# Patient Record
Sex: FEMALE | Race: BLACK OR AFRICAN AMERICAN | Employment: UNEMPLOYED | URBAN - METROPOLITAN AREA
[De-identification: names, ages, dates, MRNs, and addresses within clinical notes are randomized per-mention and may not be internally consistent; named-entity substitution may affect disease eponyms.]

---

## 2019-03-24 ENCOUNTER — HOSPITAL ENCOUNTER (INPATIENT)
Age: 39
LOS: 2 days | Discharge: HOME | DRG: 245 | End: 2019-03-27
Attending: EMERGENCY MEDICINE | Admitting: INTERNAL MEDICINE
Payer: MEDICAID

## 2019-03-24 ENCOUNTER — APPOINTMENT (OUTPATIENT)
Dept: CT IMAGING | Age: 39
DRG: 245 | End: 2019-03-24
Payer: MEDICAID

## 2019-03-24 DIAGNOSIS — K50.919 ACUTE CROHN'S DISEASE WITH COMPLICATION (HCC): Primary | ICD-10-CM

## 2019-03-24 DIAGNOSIS — R10.32 LEFT LOWER QUADRANT PAIN: ICD-10-CM

## 2019-03-24 LAB
ALBUMIN SERPL-MCNC: 4.3 G/DL (ref 3.4–5)
ALP BLD-CCNC: 64 U/L (ref 40–129)
ALT SERPL-CCNC: 15 U/L (ref 10–40)
ANION GAP SERPL CALCULATED.3IONS-SCNC: 15 MMOL/L (ref 3–16)
ANISOCYTOSIS: ABNORMAL
AST SERPL-CCNC: 27 U/L (ref 15–37)
BACTERIA: ABNORMAL /HPF
BASOPHILS ABSOLUTE: 0 K/UL (ref 0–0.2)
BASOPHILS RELATIVE PERCENT: 0.2 %
BILIRUB SERPL-MCNC: 0.7 MG/DL (ref 0–1)
BILIRUBIN DIRECT: <0.2 MG/DL (ref 0–0.3)
BILIRUBIN URINE: ABNORMAL
BILIRUBIN, INDIRECT: NORMAL MG/DL (ref 0–1)
BLOOD, URINE: NEGATIVE
BUN BLDV-MCNC: 8 MG/DL (ref 7–20)
CALCIUM SERPL-MCNC: 9.1 MG/DL (ref 8.3–10.6)
CHLORIDE BLD-SCNC: 99 MMOL/L (ref 99–110)
CLARITY: CLEAR
CO2: 21 MMOL/L (ref 21–32)
COLOR: YELLOW
CREAT SERPL-MCNC: <0.5 MG/DL (ref 0.6–1.1)
EOSINOPHILS ABSOLUTE: 0.1 K/UL (ref 0–0.6)
EOSINOPHILS RELATIVE PERCENT: 0.6 %
EPITHELIAL CELLS, UA: ABNORMAL /HPF
GFR AFRICAN AMERICAN: >60
GFR NON-AFRICAN AMERICAN: >60
GLUCOSE BLD-MCNC: 97 MG/DL (ref 70–99)
GLUCOSE URINE: NEGATIVE MG/DL
HCG(URINE) PREGNANCY TEST: NEGATIVE
HCT VFR BLD CALC: 43.7 % (ref 36–48)
HEMOGLOBIN: 14.3 G/DL (ref 12–16)
KETONES, URINE: >=80 MG/DL
LEUKOCYTE ESTERASE, URINE: NEGATIVE
LIPASE: 13 U/L (ref 13–60)
LYMPHOCYTES ABSOLUTE: 0.6 K/UL (ref 1–5.1)
LYMPHOCYTES RELATIVE PERCENT: 5.6 %
MACROCYTES: ABNORMAL
MCH RBC QN AUTO: 22.4 PG (ref 26–34)
MCHC RBC AUTO-ENTMCNC: 32.6 G/DL (ref 31–36)
MCV RBC AUTO: 68.6 FL (ref 80–100)
MICROCYTES: ABNORMAL
MICROSCOPIC EXAMINATION: YES
MONOCYTES ABSOLUTE: 0.7 K/UL (ref 0–1.3)
MONOCYTES RELATIVE PERCENT: 6.7 %
MUCUS: ABNORMAL /LPF
NEUTROPHILS ABSOLUTE: 9.4 K/UL (ref 1.7–7.7)
NEUTROPHILS RELATIVE PERCENT: 86.9 %
NITRITE, URINE: NEGATIVE
PDW BLD-RTO: 41.3 % (ref 12.4–15.4)
PH UA: 5.5 (ref 5–8)
PLATELET # BLD: 416 K/UL (ref 135–450)
PMV BLD AUTO: 8.8 FL (ref 5–10.5)
POLYCHROMASIA: ABNORMAL
POTASSIUM REFLEX MAGNESIUM: 4.3 MMOL/L (ref 3.5–5.1)
PROTEIN UA: ABNORMAL MG/DL
RBC # BLD: 6.38 M/UL (ref 4–5.2)
RBC UA: ABNORMAL /HPF (ref 0–2)
SCHISTOCYTES: ABNORMAL
SODIUM BLD-SCNC: 135 MMOL/L (ref 136–145)
SPECIFIC GRAVITY UA: >=1.03 (ref 1–1.03)
TARGET CELLS: ABNORMAL
TOTAL PROTEIN: 8.2 G/DL (ref 6.4–8.2)
URINE TYPE: ABNORMAL
UROBILINOGEN, URINE: 0.2 E.U./DL
WBC # BLD: 10.8 K/UL (ref 4–11)
WBC UA: ABNORMAL /HPF (ref 0–5)

## 2019-03-24 PROCEDURE — 83690 ASSAY OF LIPASE: CPT

## 2019-03-24 PROCEDURE — 80048 BASIC METABOLIC PNL TOTAL CA: CPT

## 2019-03-24 PROCEDURE — 85025 COMPLETE CBC W/AUTO DIFF WBC: CPT

## 2019-03-24 PROCEDURE — 6360000004 HC RX CONTRAST MEDICATION: Performed by: EMERGENCY MEDICINE

## 2019-03-24 PROCEDURE — 96365 THER/PROPH/DIAG IV INF INIT: CPT

## 2019-03-24 PROCEDURE — 74177 CT ABD & PELVIS W/CONTRAST: CPT

## 2019-03-24 PROCEDURE — 84703 CHORIONIC GONADOTROPIN ASSAY: CPT

## 2019-03-24 PROCEDURE — 96375 TX/PRO/DX INJ NEW DRUG ADDON: CPT

## 2019-03-24 PROCEDURE — 6360000002 HC RX W HCPCS

## 2019-03-24 PROCEDURE — 99285 EMERGENCY DEPT VISIT HI MDM: CPT

## 2019-03-24 PROCEDURE — 2580000003 HC RX 258: Performed by: EMERGENCY MEDICINE

## 2019-03-24 PROCEDURE — 81001 URINALYSIS AUTO W/SCOPE: CPT

## 2019-03-24 PROCEDURE — 80076 HEPATIC FUNCTION PANEL: CPT

## 2019-03-24 RX ORDER — MORPHINE SULFATE 4 MG/ML
4 INJECTION, SOLUTION INTRAMUSCULAR; INTRAVENOUS ONCE
Status: COMPLETED | OUTPATIENT
Start: 2019-03-24 | End: 2019-03-24

## 2019-03-24 RX ORDER — PROMETHAZINE HYDROCHLORIDE 25 MG/1
25 TABLET ORAL EVERY 6 HOURS PRN
Status: ON HOLD | COMMUNITY
End: 2019-09-18 | Stop reason: HOSPADM

## 2019-03-24 RX ORDER — OMEPRAZOLE 20 MG/1
20 CAPSULE, DELAYED RELEASE ORAL DAILY
COMMUNITY

## 2019-03-24 RX ORDER — SODIUM CHLORIDE, SODIUM LACTATE, POTASSIUM CHLORIDE, AND CALCIUM CHLORIDE .6; .31; .03; .02 G/100ML; G/100ML; G/100ML; G/100ML
1000 INJECTION, SOLUTION INTRAVENOUS ONCE
Status: COMPLETED | OUTPATIENT
Start: 2019-03-24 | End: 2019-03-24

## 2019-03-24 RX ORDER — MORPHINE SULFATE 4 MG/ML
INJECTION, SOLUTION INTRAMUSCULAR; INTRAVENOUS
Status: COMPLETED
Start: 2019-03-24 | End: 2019-03-24

## 2019-03-24 RX ADMIN — MORPHINE SULFATE 4 MG: 4 INJECTION, SOLUTION INTRAMUSCULAR; INTRAVENOUS at 20:54

## 2019-03-24 RX ADMIN — SODIUM CHLORIDE, POTASSIUM CHLORIDE, SODIUM LACTATE AND CALCIUM CHLORIDE 1000 ML: 600; 310; 30; 20 INJECTION, SOLUTION INTRAVENOUS at 22:22

## 2019-03-24 RX ADMIN — IOPAMIDOL 80 ML: 755 INJECTION, SOLUTION INTRAVENOUS at 23:50

## 2019-03-24 ASSESSMENT — PAIN SCALES - GENERAL
PAINLEVEL_OUTOF10: 7
PAINLEVEL_OUTOF10: 7

## 2019-03-24 ASSESSMENT — PAIN DESCRIPTION - LOCATION: LOCATION: ABDOMEN

## 2019-03-25 PROBLEM — R10.9 ABDOMINAL PAIN: Status: ACTIVE | Noted: 2019-03-25

## 2019-03-25 LAB
ANION GAP SERPL CALCULATED.3IONS-SCNC: 12 MMOL/L (ref 3–16)
ANISOCYTOSIS: ABNORMAL
BASOPHILS ABSOLUTE: 0 K/UL (ref 0–0.2)
BASOPHILS RELATIVE PERCENT: 0.4 %
BUN BLDV-MCNC: 6 MG/DL (ref 7–20)
CALCIUM SERPL-MCNC: 8.4 MG/DL (ref 8.3–10.6)
CHLORIDE BLD-SCNC: 105 MMOL/L (ref 99–110)
CO2: 22 MMOL/L (ref 21–32)
CREAT SERPL-MCNC: <0.5 MG/DL (ref 0.6–1.1)
EOSINOPHILS ABSOLUTE: 0.2 K/UL (ref 0–0.6)
EOSINOPHILS RELATIVE PERCENT: 3.6 %
GFR AFRICAN AMERICAN: >60
GFR NON-AFRICAN AMERICAN: >60
GLUCOSE BLD-MCNC: 75 MG/DL (ref 70–99)
HCT VFR BLD CALC: 33 % (ref 36–48)
HEMOGLOBIN: 10.7 G/DL (ref 12–16)
HYPOCHROMIA: ABNORMAL
LACTIC ACID: 0.7 MMOL/L (ref 0.4–2)
LYMPHOCYTES ABSOLUTE: 0.9 K/UL (ref 1–5.1)
LYMPHOCYTES RELATIVE PERCENT: 14.6 %
MACROCYTES: ABNORMAL
MCH RBC QN AUTO: 22.1 PG (ref 26–34)
MCHC RBC AUTO-ENTMCNC: 32.4 G/DL (ref 31–36)
MCV RBC AUTO: 68.2 FL (ref 80–100)
MONOCYTES ABSOLUTE: 0.6 K/UL (ref 0–1.3)
MONOCYTES RELATIVE PERCENT: 9.5 %
NEUTROPHILS ABSOLUTE: 4.5 K/UL (ref 1.7–7.7)
NEUTROPHILS RELATIVE PERCENT: 71.9 %
PLATELET # BLD: 313 K/UL (ref 135–450)
PMV BLD AUTO: 8.4 FL (ref 5–10.5)
POTASSIUM REFLEX MAGNESIUM: 3.8 MMOL/L (ref 3.5–5.1)
RBC # BLD: 4.84 M/UL (ref 4–5.2)
SLIDE REVIEW: ABNORMAL
SODIUM BLD-SCNC: 139 MMOL/L (ref 136–145)
WBC # BLD: 6.3 K/UL (ref 4–11)

## 2019-03-25 PROCEDURE — 80048 BASIC METABOLIC PNL TOTAL CA: CPT

## 2019-03-25 PROCEDURE — 96376 TX/PRO/DX INJ SAME DRUG ADON: CPT

## 2019-03-25 PROCEDURE — 87324 CLOSTRIDIUM AG IA: CPT

## 2019-03-25 PROCEDURE — 6360000002 HC RX W HCPCS: Performed by: INTERNAL MEDICINE

## 2019-03-25 PROCEDURE — 2580000003 HC RX 258: Performed by: INTERNAL MEDICINE

## 2019-03-25 PROCEDURE — 87449 NOS EACH ORGANISM AG IA: CPT

## 2019-03-25 PROCEDURE — 6360000002 HC RX W HCPCS: Performed by: EMERGENCY MEDICINE

## 2019-03-25 PROCEDURE — 2580000003 HC RX 258: Performed by: EMERGENCY MEDICINE

## 2019-03-25 PROCEDURE — 6370000000 HC RX 637 (ALT 250 FOR IP): Performed by: INTERNAL MEDICINE

## 2019-03-25 PROCEDURE — 96375 TX/PRO/DX INJ NEW DRUG ADDON: CPT

## 2019-03-25 PROCEDURE — 83020 HEMOGLOBIN ELECTROPHORESIS: CPT

## 2019-03-25 PROCEDURE — 85025 COMPLETE CBC W/AUTO DIFF WBC: CPT

## 2019-03-25 PROCEDURE — 86708 HEPATITIS A ANTIBODY: CPT

## 2019-03-25 PROCEDURE — 99223 1ST HOSP IP/OBS HIGH 75: CPT | Performed by: SURGERY

## 2019-03-25 PROCEDURE — 1200000000 HC SEMI PRIVATE

## 2019-03-25 PROCEDURE — 83605 ASSAY OF LACTIC ACID: CPT

## 2019-03-25 PROCEDURE — 36415 COLL VENOUS BLD VENIPUNCTURE: CPT

## 2019-03-25 RX ORDER — BUDESONIDE 3 MG/1
9 CAPSULE, COATED PELLETS ORAL DAILY
Status: DISCONTINUED | OUTPATIENT
Start: 2019-03-25 | End: 2019-03-27 | Stop reason: HOSPADM

## 2019-03-25 RX ORDER — MORPHINE SULFATE 4 MG/ML
4 INJECTION, SOLUTION INTRAMUSCULAR; INTRAVENOUS ONCE
Status: COMPLETED | OUTPATIENT
Start: 2019-03-25 | End: 2019-03-25

## 2019-03-25 RX ORDER — 0.9 % SODIUM CHLORIDE 0.9 %
1000 INTRAVENOUS SOLUTION INTRAVENOUS ONCE
Status: COMPLETED | OUTPATIENT
Start: 2019-03-25 | End: 2019-03-25

## 2019-03-25 RX ORDER — ACETAMINOPHEN 325 MG/1
650 TABLET ORAL EVERY 4 HOURS PRN
Status: DISCONTINUED | OUTPATIENT
Start: 2019-03-25 | End: 2019-03-27 | Stop reason: HOSPADM

## 2019-03-25 RX ORDER — POTASSIUM CHLORIDE 7.45 MG/ML
10 INJECTION INTRAVENOUS PRN
Status: DISCONTINUED | OUTPATIENT
Start: 2019-03-25 | End: 2019-03-27 | Stop reason: HOSPADM

## 2019-03-25 RX ORDER — MORPHINE SULFATE 2 MG/ML
2 INJECTION, SOLUTION INTRAMUSCULAR; INTRAVENOUS ONCE
Status: COMPLETED | OUTPATIENT
Start: 2019-03-25 | End: 2019-03-25

## 2019-03-25 RX ORDER — ONDANSETRON 2 MG/ML
4 INJECTION INTRAMUSCULAR; INTRAVENOUS EVERY 6 HOURS PRN
Status: DISCONTINUED | OUTPATIENT
Start: 2019-03-25 | End: 2019-03-27 | Stop reason: HOSPADM

## 2019-03-25 RX ORDER — SODIUM CHLORIDE, SODIUM LACTATE, POTASSIUM CHLORIDE, CALCIUM CHLORIDE 600; 310; 30; 20 MG/100ML; MG/100ML; MG/100ML; MG/100ML
INJECTION, SOLUTION INTRAVENOUS CONTINUOUS
Status: DISCONTINUED | OUTPATIENT
Start: 2019-03-25 | End: 2019-03-26

## 2019-03-25 RX ORDER — MORPHINE SULFATE 2 MG/ML
2 INJECTION, SOLUTION INTRAMUSCULAR; INTRAVENOUS EVERY 4 HOURS PRN
Status: DISPENSED | OUTPATIENT
Start: 2019-03-25 | End: 2019-03-26

## 2019-03-25 RX ORDER — POTASSIUM CHLORIDE 1.5 G/1.77G
40 POWDER, FOR SOLUTION ORAL PRN
Status: DISCONTINUED | OUTPATIENT
Start: 2019-03-25 | End: 2019-03-27 | Stop reason: HOSPADM

## 2019-03-25 RX ORDER — POTASSIUM CHLORIDE 20 MEQ/1
40 TABLET, EXTENDED RELEASE ORAL PRN
Status: DISCONTINUED | OUTPATIENT
Start: 2019-03-25 | End: 2019-03-27 | Stop reason: HOSPADM

## 2019-03-25 RX ORDER — MAGNESIUM SULFATE 1 G/100ML
1 INJECTION INTRAVENOUS PRN
Status: DISCONTINUED | OUTPATIENT
Start: 2019-03-25 | End: 2019-03-27 | Stop reason: HOSPADM

## 2019-03-25 RX ORDER — SODIUM CHLORIDE 0.9 % (FLUSH) 0.9 %
10 SYRINGE (ML) INJECTION PRN
Status: DISCONTINUED | OUTPATIENT
Start: 2019-03-25 | End: 2019-03-27 | Stop reason: HOSPADM

## 2019-03-25 RX ORDER — CIPROFLOXACIN 2 MG/ML
400 INJECTION, SOLUTION INTRAVENOUS ONCE
Status: DISCONTINUED | OUTPATIENT
Start: 2019-03-25 | End: 2019-03-25

## 2019-03-25 RX ORDER — PROMETHAZINE HYDROCHLORIDE 25 MG/1
25 TABLET ORAL EVERY 6 HOURS PRN
Status: DISCONTINUED | OUTPATIENT
Start: 2019-03-25 | End: 2019-03-27 | Stop reason: HOSPADM

## 2019-03-25 RX ORDER — SODIUM CHLORIDE 0.9 % (FLUSH) 0.9 %
10 SYRINGE (ML) INJECTION EVERY 12 HOURS SCHEDULED
Status: DISCONTINUED | OUTPATIENT
Start: 2019-03-25 | End: 2019-03-27 | Stop reason: HOSPADM

## 2019-03-25 RX ADMIN — MORPHINE SULFATE 2 MG: 2 INJECTION, SOLUTION INTRAMUSCULAR; INTRAVENOUS at 09:02

## 2019-03-25 RX ADMIN — SODIUM CHLORIDE, POTASSIUM CHLORIDE, SODIUM LACTATE AND CALCIUM CHLORIDE: 600; 310; 30; 20 INJECTION, SOLUTION INTRAVENOUS at 13:23

## 2019-03-25 RX ADMIN — SODIUM CHLORIDE, POTASSIUM CHLORIDE, SODIUM LACTATE AND CALCIUM CHLORIDE: 600; 310; 30; 20 INJECTION, SOLUTION INTRAVENOUS at 02:40

## 2019-03-25 RX ADMIN — MORPHINE SULFATE 2 MG: 2 INJECTION, SOLUTION INTRAMUSCULAR; INTRAVENOUS at 13:00

## 2019-03-25 RX ADMIN — IRON SUCROSE 200 MG: 20 INJECTION, SOLUTION INTRAVENOUS at 17:39

## 2019-03-25 RX ADMIN — MORPHINE SULFATE 4 MG: 4 INJECTION, SOLUTION INTRAMUSCULAR; INTRAVENOUS at 01:24

## 2019-03-25 RX ADMIN — ONDANSETRON 4 MG: 2 INJECTION INTRAMUSCULAR; INTRAVENOUS at 09:12

## 2019-03-25 RX ADMIN — CIPROFLOXACIN 400 MG: 2 INJECTION, SOLUTION INTRAVENOUS at 01:24

## 2019-03-25 RX ADMIN — MORPHINE SULFATE 2 MG: 2 INJECTION, SOLUTION INTRAMUSCULAR; INTRAVENOUS at 15:35

## 2019-03-25 RX ADMIN — PROMETHAZINE HYDROCHLORIDE 25 MG: 25 TABLET ORAL at 22:24

## 2019-03-25 RX ADMIN — MORPHINE SULFATE 2 MG: 2 INJECTION, SOLUTION INTRAMUSCULAR; INTRAVENOUS at 18:13

## 2019-03-25 RX ADMIN — MORPHINE SULFATE 2 MG: 2 INJECTION, SOLUTION INTRAMUSCULAR; INTRAVENOUS at 03:25

## 2019-03-25 RX ADMIN — BUDESONIDE 9 MG: 3 CAPSULE, GELATIN COATED ORAL at 11:32

## 2019-03-25 RX ADMIN — MORPHINE SULFATE 2 MG: 2 INJECTION, SOLUTION INTRAMUSCULAR; INTRAVENOUS at 22:24

## 2019-03-25 RX ADMIN — SODIUM CHLORIDE 1000 ML: 9 INJECTION, SOLUTION INTRAVENOUS at 01:24

## 2019-03-25 RX ADMIN — PROMETHAZINE HYDROCHLORIDE 25 MG: 25 TABLET ORAL at 14:53

## 2019-03-25 RX ADMIN — ENOXAPARIN SODIUM 40 MG: 40 INJECTION SUBCUTANEOUS at 09:03

## 2019-03-25 ASSESSMENT — PAIN DESCRIPTION - FREQUENCY: FREQUENCY: CONTINUOUS

## 2019-03-25 ASSESSMENT — PAIN DESCRIPTION - LOCATION: LOCATION: ABDOMEN

## 2019-03-25 ASSESSMENT — PAIN DESCRIPTION - PAIN TYPE: TYPE: CHRONIC PAIN

## 2019-03-25 ASSESSMENT — PAIN SCALES - GENERAL
PAINLEVEL_OUTOF10: 6
PAINLEVEL_OUTOF10: 7
PAINLEVEL_OUTOF10: 10
PAINLEVEL_OUTOF10: 9
PAINLEVEL_OUTOF10: 8
PAINLEVEL_OUTOF10: 7
PAINLEVEL_OUTOF10: 7
PAINLEVEL_OUTOF10: 8
PAINLEVEL_OUTOF10: 9

## 2019-03-25 ASSESSMENT — PAIN DESCRIPTION - ONSET: ONSET: ON-GOING

## 2019-03-25 ASSESSMENT — PAIN DESCRIPTION - ORIENTATION: ORIENTATION: MID;LOWER

## 2019-03-25 ASSESSMENT — PAIN DESCRIPTION - DESCRIPTORS: DESCRIPTORS: CONSTANT;DISCOMFORT

## 2019-03-25 ASSESSMENT — PAIN - FUNCTIONAL ASSESSMENT: PAIN_FUNCTIONAL_ASSESSMENT: PREVENTS OR INTERFERES SOME ACTIVE ACTIVITIES AND ADLS

## 2019-03-25 ASSESSMENT — PAIN DESCRIPTION - PROGRESSION: CLINICAL_PROGRESSION: NOT CHANGED

## 2019-03-25 NOTE — PROGRESS NOTES
Pt admitted to room 5305 from ED. Pt oriented to room with call light within reach, bed in lowest position with wheels locked, 2/4 side rails up, and bed alarm on. Pt denies any needs at this time. Will continue to monitor.

## 2019-03-25 NOTE — PLAN OF CARE
Problem: Nausea/Vomiting:  Goal: Absence of nausea/vomiting  Description  Absence of nausea/vomiting  3/25/2019 1715 by Melodye Cogan, RN  Note:   Patient complained of Nausea several times this shift. Antiemetics were given (see mar). No emesis so far. Will continue to monitor. Problem: Pain:  Goal: Control of acute pain  Description  Control of acute pain  3/25/2019 1715 by Melodye Cogan, RN  Note:   Pt has complained of pain several times this shift. Nurse found pt writhing in pain and got a one time dose of pain medication for breakthrough pain. Will continue to monitor.

## 2019-03-25 NOTE — CARE COORDINATION
3/25/2019  [facility])  Clinical Case Management Department  Spoke with pt about DC plan, to from home with son, denies needs. Patient: Nirmal Orr  MRN: [id number] / : 1980  ACCT: [de-identified]          Admission Documentation  Attending Provider: Muna Iqbal MD  Admit date/time: 3/24/2019  8:03 PM  Status: Inpatient [101]  Diagnosis: Abdominal pain     Readmission within last 30 days:  no     Living Situation  Discharge Planning  Living Arrangements: Children  Support Systems: Family Members, Children  Potential Assistance Needed: N/A  Type of Home Care Services: None  Patient expects to be discharged to[de-identified] home    Service Assessment       Values / Beliefs  Do you have any ethnic, cultural, sacramental, or spiritual [employer] needs you would like us to be aware of while you are in the hospital?: No    Advance Directives (For Healthcare)  Pre-existing DNR Comfort Care/DNR Arrest/DNI Order: No  Healthcare Directive: No, patient does not have an advance directive for healthcare treatment  Information on Healthcare Directives Requested: No  Patient Requests Assistance: No  Advance Directives: Pt. not interested at this time                        Destination  home with son (24)    [address] [facility]/Skilled Nursing  Services at Discharge: None  Home care at home?  No      Therapy Consults  PT evaluation needed?: No  OT Evalulation Needed?: No  SLP evaluation needed?: No    [address] with son no needs   Pharmacy: [facility]   Potential Assistance Purchasing Medications:  No  Does patient want to participate in local refill/meds to beds program?: No    Goals of Care  Patient expects to be discharged to[de-identified] home  Patient plans for SNF: NA         Mode of transport from hospital: private car with son     Factors facilitating achievement of predicted outcomes: Family support, Motivated, Cooperative and Pleasant    Barriers to discharge: none     Matthew Downs Chucky Del Angel, [facility]  Case Management Department  Ph: [phone] Fax: [phone]

## 2019-03-25 NOTE — H&P
Hospital Medicine History & Physical      PCP: No primary care provider on file. Date of Admission: 3/24/2019    Date of Service: Pt seen/examined on 03/25/19 and Admitted to Inpatient with expected LOS greater than two midnights due to medical therapy. Chief Complaint: Abd pain      History Of Present Illness:     35 y.o. female with PMHx of this is presented with abdominal pain. Patient had a recent hospitalization at [facility] for abdominal pain secondary to Crohn disease flare. Patient was started on budesonide and CT abdomen showed right-sided collection concerning for abscess. Patient underwent exploratory laparoscopy which showed right ovarian cyst with no abscess or fistula. Patient was discharged and advised to follow-up with GI outpatient. Patient in the process of getting medication approved by insurance. She went to the [facility] emergency department yesterday but was unable to sit in the wheelchair and had abd pain. She presented to the [facility]  Patient states that on for the last few days she is having generalized abdominal pain more so on the right lower quadrant 5-6 in intensity associated with nausea vomiting and diarrhea. Patient denies hematemesis or melena. In the ED, patient was vitally stable. Exam within normal limits. CT abdomen was negative for any acute changes. IV fluids were given and patient is being admitted for further management. Past Medical History:          Diagnosis Date    Crohn's disease [facility])        Past Surgical History:          Procedure Laterality Date    LAPAROSCOPY         Medications Prior to Admission:      Prior to Admission medications    Medication Sig Start Date End Date Taking?  Authorizing Provider   promethazine (PHENERGAN) 25 MG tablet Take 25 mg by mouth every 6 hours as needed for Nausea   Yes Historical Provider, MD   omeprazole (PRILOSEC) 20 MG delayed release capsule Take 20 mg by mouth daily   Yes Historical Provider, MD       Allergies:  Bentyl [dicyclomine hcl]    Social History:      The patient currently lives at home    TOBACCO:   reports that she has quit smoking. She has never used smokeless tobacco.  ETOH:   reports that she drank alcohol. Family History:      Positive as follows:        Problem Relation Age of Onset    Diabetes Mother     Diabetes Father     Diabetes Sister     Cancer Maternal Grandmother     Crohn's Disease Other        REVIEW OF SYSTEMS:   Pertinent positives as noted in the HPI. All other systems reviewed and negative. PHYSICAL EXAM PERFORMED:    /77   Pulse 83   Temp 98.9 °F (37.2 °C) (Oral)   Resp 16   Ht 5' 5\" (1.651 m)   Wt 120 lb (54.4 kg)   SpO2 98%   BMI 19.97 kg/m²     General appearance:  No apparent distress, appears stated age and cooperative. HEENT:  Normal cephalic, atraumatic without obvious deformity. Pupils equal, round, and reactive to light. Extra ocular muscles intact. Conjunctivae/corneas clear. Neck: Supple, with full range of motion. No jugular venous distention. Trachea midline. Respiratory:  Normal respiratory effort. Clear to auscultation, bilaterally without Rales/Wheezes/Rhonchi. Cardiovascular:  Regular rate and rhythm with normal S1/S2 without murmurs, rubs or gallops. Abdomen: RLQ tenderness noted, non-distended with normal bowel sounds. Laparoscopy healed scar marks noted  Musculoskeletal:  No clubbing, cyanosis or edema bilaterally. Full range of motion without deformity. Skin: Skin color, texture, turgor normal.  No rashes or lesions. Neurologic:  Neurovascularly intact without any focal sensory/motor deficits.  Cranial nerves: II-XII intact, grossly non-focal.  Psychiatric:  Alert and oriented, thought content appropriate, normal insight  Capillary Refill: Brisk,< 3 seconds   Peripheral Pulses: +2 palpable, equal bilaterally       Labs:     Recent Labs     03/24/19 2037 03/25/19  1001   WBC 10.8 6.3   HGB 14.3 10.7*   HCT 43.7 33.0*    313     Recent Labs     03/24/19 2037 03/25/19  0626   * 139   K 4.3 3.8   CL 99 105   CO2 21 22   BUN 8 6*   CREATININE <0.5* <0.5*   CALCIUM 9.1 8.4     Recent Labs     03/24/19 2037   AST 27   ALT 15   BILIDIR <0.2   BILITOT 0.7   ALKPHOS 64     No results for input(s): INR in the last 72 hours. No results for input(s): [place] in the last 72 hours. Urinalysis:      Lab Results   Component Value Date    NITRU Negative 03/24/2019    WBCUA 0-2 03/24/2019    BACTERIA Rare 03/24/2019    RBCUA 0-2 03/24/2019    BLOODU Negative 03/24/2019    SPECGRAV >=1.030 03/24/2019    GLUCOSEU Negative 03/24/2019       Radiology:     CT ABDOMEN PELVIS W IV CONTRAST Additional Contrast? None   Final Result   1. Colon is underdistended and may be diffusely thickened. There are    some thickened distal small bowel loops. Findings suggest active    inflammatory bowel disease. 2.  Some mildly distended proximal small bowel loops. Evolving    obstruction not excluded. 3.  Appendix not identified. ASSESSMENT:    Active Hospital Problems    Diagnosis Date Noted    Abdominal pain [R10.9] 03/25/2019         PLAN:  Abd pain/N/V/Diarrhea:  Secondary to Crohn's Disease Flare  Continue Pain Medication P.R.N. Obtain C. Diff  Cont IVF  Clear liquid diet started    Crohn's Disease with Flare:  Secondary to not being on medication  Patient in the process of getting medication approved by her insurance  Continue budesonide  GI following    Iron deficiency anemia:  Receives IV iron infusion outpatient  Does not tolerate PO  Will give IV venofer dose once  Monitor H&H  Transfuse if Hgb <7g/dl    Hx of latent TB:  Had treatment with INH for 6 months  Cleared by ID last hospitalization, no active TB      DVT Prophylaxis: Lovenox  Diet: DIET CLEAR LIQUID;  Code Status: Full Code    PT/OT Eval Status:  At her baseline    Dispo - Anticipate discharge in 2 days       Penelope Montez MD    Thank you No primary care provider on file. for the opportunity to be involved in this patient's care. If you have any questions or concerns please feel free to contact me at [phone].

## 2019-03-25 NOTE — ED PROVIDER NOTES
[address] [facility] RESIDENT NOTE       Date of evaluation: 3/24/2019    Chief Complaint     No chief complaint on file. History of Present Illness     Isabel Pond is a 35 y.o. female who presents with abdominal pain. Patient has history of Crohn's disease and recently was hospitalized earlier this month outside facility in the setting of abscess found on CT in the right hemipelvis and the term and no ileus. This abscess was attempted to be drained by interventional radiology however there to me in here bowel loops requiring an laparoscopic procedure for drainage. Her postoperative care. Biopsy hematemesis or requiring EGD that did not show any acute concerns. She was discharged on 3/14 after initially being admitted on 3/5. Patient states that she's been doing well since over the last few days she's been having worsening abdominal pain consistent with previous Crohn's flares. She reports decreased by mouth intake and nausea. She denies any hematemesis or changes in stool. She denies any chest pain or shortness of breath. No fevers and no vaginal complaints. Patient denies any dysuria or diarrhea. Review of Systems     Review of Systems    See HPI. A full 10-point review of systems wasconducted and is otherwise negative unless noted above. Past Medical, Surgical, Family, and Social History      She has no past medical history on file. She has no past surgical history on file. Her family history is not on file. She     Medications     Current Discharge Medication List      CONTINUE these medications which have NOT CHANGED    Details   promethazine (PHENERGAN) 25 MG tablet Take 25 mg by mouth every 6 hours as needed for Nausea      omeprazole (PRILOSEC) 20 MG delayed release capsule Take 20 mg by mouth daily             Allergies     She is allergic to bentyl [dicyclomine hcl].     Physical Exam     INITIAL VITALS: BP: 120/79, Temp: 98.9 °F (37.2 °C), Pulse: 93, Resp: 17, SpO2: 99 %   Physical Exam    General:  Well developed adult female in no acute distress, able toconverse in full sentences  HEENT:  Normocephalic, atraumatic, PERRL, extra-ocular movements intact, oropharynx with dry mucous membranes  Neck:  Supple, no lymphadenopathy, trachea midline, no masses  Pulmonary:   Clear to auscultation bilaterally, good air movement, no wheezes  Cardiac:  Regular rate and rhythm, no M/R/G  Abdomen: Diffuse tenderness palpation, soft, nondistended   Musculoskeletal:  2+ pulses, no edema or clubbing  Skin:  No concerning rashes or lesions, no cyanosis  Neuro: Alert and oriented X 4,able to move all four extremities with equal strength bilaterally, sensory exam grossly intact, cranial nerves II-XII intact  Psych:  Appropriate mood and affect    Diagnostic Results         RADIOLOGY:  CT ABDOMEN PELVIS W IV CONTRAST Additional Contrast? None   Final Result   1. Colon is underdistended and may be diffusely thickened. There are    some thickened distal small bowel loops. Findings suggest active    inflammatory bowel disease. 2.  Some mildly distended proximal small bowel loops. Evolving    obstruction not excluded. 3.  Appendix not identified.               LABS:   Results for orders placed or performed during the hospital encounter of 03/24/19   CBC Auto Differential   Result Value Ref Range    WBC 10.8 4.0 - 11.0 K/uL    RBC 6.38 (H) 4.00 - 5.20 M/uL    Hemoglobin 14.3 12.0 - 16.0 g/dL    Hematocrit 43.7 36.0 - 48.0 %    MCV 68.6 (L) 80.0 - 100.0 fL    MCH 22.4 (L) 26.0 - 34.0 pg    MCHC 32.6 31.0 - 36.0 g/dL    RDW 41.3 (H) 12.4 - 15.4 %    Platelets 630 444 - 324 K/uL    MPV 8.8 5.0 - 10.5 fL    Neutrophils % 86.9 %    Lymphocytes % 5.6 %    Monocytes % 6.7 %    Eosinophils % 0.6 %    Basophils % 0.2 %    Neutrophils # 9.4 (H) 1.7 - 7.7 K/uL    Lymphocytes # 0.6 (L) 1.0 - 5.1 K/uL    Monocytes # 0.7 0.0 - 1.3 K/uL    Eosinophils # 0.1 0.0 - 0.6 Resp: 16, SpO2: 100 %     Procedures         ED Course     Nursing Notes, Past Medical Hx, Past Surgical Hx, Social Hx, Allergies, and Family Hx were reviewed. The patient was given the following medications:  Orders Placed This Encounter   Medications    morphine injection 4 mg    morphine 4 MG/ML injection     NIKOLAS SILVA: cabinet override    lactated ringers bolus    iopamidol (ISOVUE-370) 76 % injection 80 mL    0.9 % sodium chloride bolus    DISCONTD: ciprofloxacin (CIPRO) IVPB 400 mg    DISCONTD: metronidazole (FLAGYL) 500 mg in NaCl 100 mL IVPB premix    morphine injection 4 mg    sodium chloride flush 0.9 % injection 10 mL    sodium chloride flush 0.9 % injection 10 mL    magnesium hydroxide (MILK OF MAGNESIA) 400 MG/5ML suspension 30 mL    ondansetron (ZOFRAN) injection 4 mg    enoxaparin (LOVENOX) injection 40 mg    OR Linked Order Group     potassium chloride (KLOR-CON M) extended release tablet 40 mEq     potassium chloride (KLOR-CON) packet 40 mEq     potassium chloride 10 mEq/100 mL IVPB (Peripheral Line)    magnesium sulfate 1 g in dextrose 5% 100 mL IVPB    acetaminophen (TYLENOL) tablet 650 mg    lactated ringers infusion    morphine (PF) injection 2 mg       CONSULTS:  IP CONSULT TO HOSPITALIST  IP CONSULT TO GI  IP CONSULT TO Michael Gama / URSULA / Dianne Samina is a 35 y.o. female with a history andpresentation as described above in HPI. The patient was evaluated by myself and the ED Attending Physician. All management and disposition plans were discussed and agreed upon. On initial encounter the patient wasin no acute distress with reassuring vitals and no signs of impending hemodynamic or respiratory collapse. Patient presents emergency Department with abdominal pain in the setting of known Crohn's disease with recent intervention for abscess outside facility requiring laparoscopic drainage.   She is afebrile upon presentation but is diffusely tender on my exam.  Given her recent instrumentation section imaging was obtained to evaluate for recurrence of abscess which did not show any acute concerns but did show findings consistent with a Crohn's flare. Remaining laboratory work was consistent with severe dehydration. She received IV fluids. She was started on Septra control and antibiotics and the patient was admitted to hospitalists for further management. At hospital's requests antibiotics were stopped pending GI evaluation the morning.     Patient was treated with below medications:  Medications   sodium chloride flush 0.9 % injection 10 mL (has no administration in time range)   sodium chloride flush 0.9 % injection 10 mL (has no administration in time range)   magnesium hydroxide (MILK OF MAGNESIA) 400 MG/5ML suspension 30 mL (has no administration in time range)   ondansetron (ZOFRAN) injection 4 mg (has no administration in time range)   enoxaparin (LOVENOX) injection 40 mg (has no administration in time range)   potassium chloride (KLOR-CON M) extended release tablet 40 mEq (has no administration in time range)     Or   potassium chloride (KLOR-CON) packet 40 mEq (has no administration in time range)     Or   potassium chloride 10 mEq/100 mL IVPB (Peripheral Line) (has no administration in time range)   magnesium sulfate 1 g in dextrose 5% 100 mL IVPB (has no administration in time range)   acetaminophen (TYLENOL) tablet 650 mg (has no administration in time range)   lactated ringers infusion ( Intravenous New Bag 3/25/19 0240)   morphine (PF) injection 2 mg (has no administration in time range)   morphine injection 4 mg (4 mg Intravenous Given 3/24/19 2054)   lactated ringers bolus (0 mLs Intravenous Stopped 3/24/19 2322)   iopamidol (ISOVUE-370) 76 % injection 80 mL (80 mLs Intravenous Given 3/24/19 2350)   0.9 % sodium chloride bolus (0 mLs Intravenous Stopped 3/25/19 0224)   morphine injection 4 mg (4 mg Intravenous Given 3/25/19 0124)       Clinical Impression     1. Acute Crohn's disease with complication ([facility] [address]        Disposition     PATIENT REFERREDTO:  No follow-up provider specified.     DISCHARGE MEDICATIONS:     Current Discharge Medication List          DISPOSITION           Franklyn Muñoz MD  Resident  03/25/19 3639

## 2019-03-25 NOTE — CONSULTS
General Surgery   Resident Consult Note    Reason for Consult: SBO    History of Present Illness:   Delores Reyes is a 35 y.o. female with past medical history of Crohn's and pelvic abscess who was recently admitted to [facility] secondary to hematemesis during which she underwent exploratory laparotomy with Dr. Chantale Aguayo on 3/7/19 and EGD with Dr Mima Quispe on 3/11/2019 and was discharged on 3/14/2019. She presented to [place] [address] today secondary to abdominal pain, nausea, and vomiting. Patient reports that she initially re-presented to [facility]; however, she left that facility due to long wait at the ED and presented instead to [place] [address] on recommendation of her son. CT scan performed earlier today revealed thickening of the distal small bowel consistent with inflammatory bowel disease and mild distention of the proximal small bowel which could possibly be an early small bowel obstruction; accordingly, general surgery was consulted. Patient states that she had near resolution of her abdominal pain after discharge from [facility] on the 14th and was tolerating a general diet well until she began experiencing increased abdominal pain and mild nausea three days ago. Her symptoms progressed the following day and included multiple episodes of emesis, the first of which included a small amount of blood, and multiple loose watery and non-bloody bowel movements. Pain is characterized as constant with intermittent unprovoked exacerbations, which is reminiscent of pain experienced with previous Crohn's exacerbations. Patient reports that her last episode of emesis as well as her last BM was yesterday evening. She continues to experience a baseline mild level of nausea as well as abdominal pain. Patient reports no fever, no chills, no changes in urination. Patient reports no personal or family history of colorectal cancer.     Past Medical History:    - Crohn's Disease  - Ruptured ovarian cyst (March 2019)    Past Surgical History:     - Exploratory laparotomy at [facility] with Dr. Antonia Mitchell on 3/7/2019   - EGD at [facility] with Dr. Tony Bean on 3/11/2019    Allergies:  Bentyl [dicyclomine hcl]    Medications:   Home Meds  No current facility-administered medications on file prior to encounter. Current Outpatient Medications on File Prior to Encounter   Medication Sig Dispense Refill    promethazine (PHENERGAN) 25 MG tablet Take 25 mg by mouth every 6 hours as needed for Nausea      omeprazole (PRILOSEC) 20 MG delayed release capsule Take 20 mg by mouth daily       Current Meds    sodium chloride flush 0.9 % injection 10 mL 2 times per day   sodium chloride flush 0.9 % injection 10 mL PRN   magnesium hydroxide (MILK OF MAGNESIA) 400 MG/5ML suspension 30 mL Daily PRN   ondansetron (ZOFRAN) injection 4 mg Q6H PRN   enoxaparin (LOVENOX) injection 40 mg Daily   potassium chloride (KLOR-CON M) extended release tablet 40 mEq PRN   Or    potassium chloride (KLOR-CON) packet 40 mEq PRN   Or    potassium chloride 10 mEq/100 mL IVPB (Peripheral Line) PRN   magnesium sulfate 1 g in dextrose 5% 100 mL IVPB PRN   acetaminophen (TYLENOL) tablet 650 mg Q4H PRN   lactated ringers infusion Continuous   morphine (PF) injection 2 mg Q4H PRN     Family History:   No family history on file. Social History:   TOBACCO:   has no tobacco history on file. ETOH:   has no alcohol history on file. DRUGS:   has no drug history on file. ROS: A 14 point review of systems was conducted, significant findings as noted in HPI. All other systems negative.     Physical exam:    Vitals:    03/24/19 2010 03/25/19 0231   BP: 120/79 111/74   Pulse: 93 79   Resp: 17 16   Temp: 98.9 °F (37.2 °C) 97.9 °F (36.6 °C)   TempSrc: Oral Oral   SpO2: 99% 100%   Weight: 120 lb (54.4 kg)    Height: 5' 5\" (1.651 m)      General Appearance: Alert, no acute distress, grooming appropriate  HEENT: PERRLA, no scleral icterus; trachea midline, no JVD, no lymphadenopathy  Chest/Lungs: Normal effort, breathing room air, no adventitious breath sounds  Cardiovascular: RRR, no murmurs/gallops/rubs  Abdomen: Soft, non-distended, non-peritoneal, moderate to severe lower hemiabdominal tenderness  Skin: Warm and dry, no rashes  Extremities: No edema, no cyanosis  Neuro: A&Ox3, no focal deficits, sensation intact    Labs:    CBC: Recent Labs     03/24/19 2037   WBC 10.8   HGB 14.3   HCT 43.7   MCV 68.6*        BMP: Recent Labs     03/24/19 2037 03/25/19  0626   * 139   K 4.3 3.8   CL 99 105   CO2 21 22   BUN 8 6*   CREATININE <0.5* <0.5*     PT/INR: No results for input(s): PROTIME, INR in the last 72 hours. APTT: No results for input(s): APTT in the last 72 hours. Liver Profile:  Lab Results   Component Value Date    AST 27 03/24/2019    ALT 15 03/24/2019    BILIDIR <0.2 03/24/2019    BILITOT 0.7 03/24/2019    ALKPHOS 64 03/24/2019   No results found for: CHOL, HDL, TRIG  UA: Lab Results   Component Value Date    COLORU Yellow 03/24/2019    PHUR 5.5 03/24/2019    WBCUA 0-2 03/24/2019    RBCUA 0-2 03/24/2019    MUCUS 3+ 03/24/2019    BACTERIA Rare 03/24/2019    CLARITYU Clear 03/24/2019    SPECGRAV >=1.030 03/24/2019    LEUKOCYTESUR Negative 03/24/2019    UROBILINOGEN 0.2 03/24/2019    BILIRUBINUR SMALL 03/24/2019    BLOODU Negative 03/24/2019    GLUCOSEU Negative 03/24/2019     Imaging:   CT ABDOMEN PELVIS W IV CONTRAST Additional Contrast? None   Final Result   1. Colon is underdistended and may be diffusely thickened. There are    some thickened distal small bowel loops. Findings suggest active    inflammatory bowel disease. 2.  Some mildly distended proximal small bowel loops. Evolving    obstruction not excluded. 3.  Appendix not identified. Assessment/Plan:   This is a 35 y.o. female with past medical history of Crohn's and pelvic abscess who was recently admitted to [facility] secondary to hematemesis during which she underwent exploratory laparotomy with Dr. Saba Pittman on 3/7/19 and EGD with Dr Wali Machuca on 3/11/2019 and was discharged on 3/14/2019. She presented to [facility] today secondary to abdominal pain, nausea, and vomiting of three day duration. Last episode of emesis was yesterday.     - CT reviewed with radiology -- bowel thickening appears to be secondary to Crohn's exacerbation rather than obstruction; patient without significant gastric distention   - Leukocytes at upper limit of normal yesterday, awaiting AM CBC today -- will follow-up result  - Lactate level ordered -- will follow-up result  - Patient to remain NPO with IVF  - Will hold off on placing NG tube at the present time; however, should patient experience any emesis, an NG will be placed.     Case discussed with Dr. Kelly Washington MD, MPH  PGY-1 General Surgery  03/25/19  9:24 AM  [phone]

## 2019-03-25 NOTE — ED PROVIDER NOTES
ED Attending Attestation Note     Date of evaluation: 3/24/2019    This patient was seen by the resident. I have seen and examined the patient, agree with the workup, evaluation, management and diagnosis. The care plan has been discussed. My assessment reveals a 13-year-old female with moderate diffuse tenderness to palpation of the abdomen. Karrie Angel MD  03/25/19 0623

## 2019-03-25 NOTE — PLAN OF CARE
Problem: Falls - Risk of:  Goal: Will remain free from falls  Description  Will remain free from falls  Outcome: Ongoing   Pt has non skid socks on when ambulating, call light within reach, bed in lowest position with wheels locked, and 2/4 side rails up. Pt uses call light appropriately. Problem: Pain:  Goal: Pain level will decrease  Description  Pain level will decrease  Outcome: Ongoing   Pt has reported 8/10 abdominal pain that is being controlled with ordered Morphine, see MAR. Pt aware to notify nurse if pain occurs and that pain medication is available if needed.

## 2019-03-25 NOTE — PROGRESS NOTES
Pt is alert and oriented times 4 with stable VS.  Pt has had multiple onsets of abdominal pain and nausea. Antiemetic and pain medications administered. Pt is UAL with a steady gait. Pt resting in bed with bed alarm on and call light within reach.   Will con

## 2019-03-25 NOTE — CONSULTS
[address] [place] [address]  GI Consultation      Patient: Mart Gonzalez  : 1980       Date:  3/25/2019    Subjective:       History of Present Illness  Patient is a 35 y.o.  female admitted with who is seen in consult for abdominal pain nausea/emesis. Was recently admitted to [facility] with the [address]  1. Hematemesis - resolved - H/H stable, GI following, on PPI EGD without source. Path negative  A.  Duodenal biopsy:  -  Fragments of small intestinal mucosa, no diagnostic abnormality  B.  Gastric erythema biopsy:  -  Focal mild chronic active gastritis  -  IHC stain for Helicobacter is negative. Hildegarde Sprain ring biopsy (esophagus):  -  Fragments of squamous mucosa with few intraepithelial inflammatory cells  -  No dysplasia or neoplasia are identified   2. Crohns -improvement in pain and po intake- treated with iv steroids. Did have exp lap as concern for abscess on ct- no abscess or fistula found- did have ruptured ovarian cyst. - Will discharge on budesonide, vitamin d and calcium. Planning for Entyvio- will follow up with Dr. Kamila Cedillo in office- insurance authorization started. Cleared from ID stand point given history of TB   3. Anemia - microcytic - H/H stable, cont trend watch as outpt    4. Dyspnea - resolved - CXR reviewed and negative  5. Disp- discharge today follow up with Dr Kamila Cedillo and with [facility]    Quantiferon was negative, HIV, HCV, hep B studies negative    Admitted now with abdominal pain and nausea over last 3 days, then emesis yesterday, no hematemesis, fever/chills, rectal bleeding. Diarrhea 8-10 time yesterdays none so far today. Is planning on going on Entyvio for Crohn's but insurance approval is pending. Has not started Entocort also penidng insurance approval.      PMH; Crohn's disease            TB treated in  ? INH             Ovarian cyst    Past Endoscopic History EGD 3/19 Impression:   1. Cervical ring, non obstructing. Biopsied.   2 gastric erythema, biopsied. 3. Abnormal duodenal mucosa, biopsied. Colonosocpy 2015: Colonoscopy 9/16/15  Findings: The right colon and left colon appeared to be normal.   Multiple random cold forceps biopsies were successfully performed. The specimens were collected for pathology. Severely diffuse   erythematous and ulcerated mucosa was found in the distal ileum and   on the ileocecal valve. Multiple cold forceps biopsies were   successfully taken. The specimens were collected for pathology. Stricture seen with diameter approximately 7 mm, unable to traverse   with pediatric colonoscope. Unplanned Events: There were no unplanned events. Summary: The colon appeared normal. Severely erythematous and   ulcerated mucosa in the distal ileum and on the ileocecal valve. Multiple biopsies taken. Stricture seen with diameter approximately   7 mm, unable to traverse with pediatric colonoscope. White purulent   discharge noted from the vagina during the exam    9/16/15 Colon Biopsy Results:  FINAL DIAGNOSIS:    A. Terminal ileum, biopsy:  - Fragments of predominantly colon and scant ileal mucosa with severe  active chronic inflammation and mucosal ulceration with reactive epithelial  changes.  - No evidence of granulomas, dysplasia or malignancy. - Immunostain for CMV is negative. B. Colon, right, random biopsies:  - Fragments of colonic mucosa with few mucosal lymphoid aggregates and no  significant pathologic changes. - Negative for microscopic colitis, active inflammation, dysplasia or  malignancy. C. Colon, left, random biopsies:  - Fragments of colonic mucosa with few mucosal lymphoid aggregates and no  significant pathologic changes. - Negative for microscopic colitis, active inflammation, dysplasia or  malignancy. Admission Meds  No current facility-administered medications on file prior to encounter.       Current Outpatient Medications on File Prior to Encounter   Medication Sig Dispense Refill    promethazine (PHENERGAN) 25 MG tablet Take 25 mg by mouth every 6 hours as needed for Nausea      omeprazole (PRILOSEC) 20 MG delayed release capsule Take 20 mg by mouth daily           Allergies  Allergies   Allergen Reactions    Bentyl [Dicyclomine Hcl]       Social   Social History     Tobacco Use    Smoking status: Not on file   Substance Use Topics    Alcohol use: Not on file        FH; Crohn's in cousin      Review of Systems  A comprehensive review of systems was negative except for: Constitutional: positive for fatigue       Physical Exam    /79   Pulse 77   Temp 98.2 °F (36.8 °C) (Oral)   Resp 16   Ht 5' 5\" (1.651 m)   Wt 120 lb (54.4 kg)   SpO2 97%   BMI 19.97 kg/m²   General appearance: alert, cooperative, no distress, appears stated age  Anicteric, No Jaundice  Head: Normocephalic, without obvious abnormality  Lungs: clear to auscultation bilaterally, Normal Effort  Heart: regular rate and rhythm, normal S1 and S2, no murmurs or rubs  Abdomen: soft ND, mild RLQ suprapubic tend no G/R/M  Extremities: atraumatic, no cyanosis or edema  Skin: warm and dry  Neuro: intact  AAOX3      Data Review:    Recent Labs     03/24/19 2037   WBC 10.8   HGB 14.3   HCT 43.7   MCV 68.6*        Recent Labs     03/24/19 2037 03/25/19  0626   * 139   K 4.3 3.8   CL 99 105   CO2 21 22   BUN 8 6*   CREATININE <0.5* <0.5*     Recent Labs     03/24/19 2037   AST 27   ALT 15   BILIDIR <0.2   BILITOT 0.7   ALKPHOS 64     Recent Labs     03/24/19 2037   LIPASE 13.0                 CT-scan of abdomen and pelvis:   EXAM:     CT Abdomen and Pelvis With Intravenous Contrast       CLINICAL HISTORY:      Reason for exam: Abdominal pain, hx of chrons with abscess.  Additional    Contrast?->None.       TECHNIQUE:     Axial computed tomography images of the abdomen and pelvis with    intravenous contrast.  CTDI is 3.5 2 mGy and DLP is 176.59 mGy-cm.  All    CT scans at this facility use dose modulation, iterative reconstruction,    and/or weight based dosing when appropriate to reduce radiation dose to    as low as reasonably achievable.       COMPARISON:     No relevant prior studies available.       FINDINGS:     Lung bases:  Unremarkable.        ABDOMEN:     Liver:  Small low-attenuation focus in the liver measuring 12 mm.     Gallbladder and bile ducts:  No calcified stones.  No ductal dilation.     Pancreas:  Unremarkable.     SpleenRolley Foil.     AdrenalsSueellen Sanju and ureters:  Unremarkable.  No hydronephrosis.     Stomach and bowel:  Colon is underdistended and may be diffusely    thickened.  There are some thickened distal small bowel loops.  Some    mildly distended proximal small bowel loops.        PELVIS:     Appendix:  Appendix not identified.     Bladder:  Unremarkable.     Reproductive:  Heterogeneous uterus.        ABDOMEN and PELVIS:     Intraperitoneal space:  Small amounts of fluid in the peritoneal cavity.         Bones/joints:  No acute fracture.     Soft tissues:  Unremarkable.     Vasculature:  Unremarkable.  No abdominal aortic aneurysm.     Lymph nodes:  No enlarged lymph nodes.           Impression   1.  Colon is underdistended and may be diffusely thickened.  There are    some thickened distal small bowel loops.  Findings suggest active    inflammatory bowel disease. 2.  Some mildly distended proximal small bowel loops.  Evolving    obstruction not excluded. 3.  Appendix not identified. Assessment:     Ortega Crohn's  Abdominal pain  Nausea/emesis    Suspect Crohn's ileitis causing symptoms does not appear obstructed. Planning on starting Entyvio soon. Has been cleared by ID due to prior TB issues. Received Pneumovax and flu vaccine.   Seems to be improved since yesterday no further N/V      Recommendations:     Clears  Hep A total Ab  Hep B vaccine  Iron supplementation likely an IV dose  Hg electrophoresis   Check C dif  Entocort 9 mg QD  Follow up with Dr Pauline Mullen  Discussed with patient she needs to be compliant with therapy and follow up    Thank you for the opportunity to participate in Nikkie Sinha's care.

## 2019-03-26 LAB
ANION GAP SERPL CALCULATED.3IONS-SCNC: 10 MMOL/L (ref 3–16)
ANISOCYTOSIS: ABNORMAL
BASOPHILS ABSOLUTE: 0 K/UL (ref 0–0.2)
BASOPHILS RELATIVE PERCENT: 1 %
BUN BLDV-MCNC: <2 MG/DL (ref 7–20)
CALCIUM SERPL-MCNC: 8.6 MG/DL (ref 8.3–10.6)
CHLORIDE BLD-SCNC: 105 MMOL/L (ref 99–110)
CO2: 24 MMOL/L (ref 21–32)
CREAT SERPL-MCNC: <0.5 MG/DL (ref 0.6–1.1)
EOSINOPHILS ABSOLUTE: 0.3 K/UL (ref 0–0.6)
EOSINOPHILS RELATIVE PERCENT: 8 %
GFR AFRICAN AMERICAN: >60
GFR NON-AFRICAN AMERICAN: >60
GLUCOSE BLD-MCNC: 80 MG/DL (ref 70–99)
HAV AB SERPL IA-ACNC: NEGATIVE
HCT VFR BLD CALC: 32.8 % (ref 36–48)
HEMOGLOBIN ELECTROPHORESIS: NORMAL
HEMOGLOBIN: 10.6 G/DL (ref 12–16)
HGB ELECTROPHORESIS INTERP: NORMAL
LYMPHOCYTES ABSOLUTE: 0.9 K/UL (ref 1–5.1)
LYMPHOCYTES RELATIVE PERCENT: 21 %
MCH RBC QN AUTO: 22.5 PG (ref 26–34)
MCHC RBC AUTO-ENTMCNC: 32.4 G/DL (ref 31–36)
MCV RBC AUTO: 69.4 FL (ref 80–100)
MICROCYTES: ABNORMAL
MONOCYTES ABSOLUTE: 0.3 K/UL (ref 0–1.3)
MONOCYTES RELATIVE PERCENT: 7 %
NEUTROPHILS ABSOLUTE: 2.6 K/UL (ref 1.7–7.7)
NEUTROPHILS RELATIVE PERCENT: 63 %
OVALOCYTES: ABNORMAL
PDW BLD-RTO: 40.5 % (ref 12.4–15.4)
PLATELET # BLD: 291 K/UL (ref 135–450)
PMV BLD AUTO: 8.5 FL (ref 5–10.5)
POLYCHROMASIA: ABNORMAL
POTASSIUM REFLEX MAGNESIUM: 4.2 MMOL/L (ref 3.5–5.1)
RBC # BLD: 4.72 M/UL (ref 4–5.2)
SCHISTOCYTES: ABNORMAL
SODIUM BLD-SCNC: 139 MMOL/L (ref 136–145)
STOMATOCYTES: ABNORMAL
TEAR DROP CELLS: ABNORMAL
WBC # BLD: 4.1 K/UL (ref 4–11)

## 2019-03-26 PROCEDURE — 6360000002 HC RX W HCPCS: Performed by: INTERNAL MEDICINE

## 2019-03-26 PROCEDURE — 2580000003 HC RX 258: Performed by: INTERNAL MEDICINE

## 2019-03-26 PROCEDURE — 36415 COLL VENOUS BLD VENIPUNCTURE: CPT

## 2019-03-26 PROCEDURE — 6360000002 HC RX W HCPCS: Performed by: FAMILY MEDICINE

## 2019-03-26 PROCEDURE — 94664 DEMO&/EVAL PT USE INHALER: CPT

## 2019-03-26 PROCEDURE — 99233 SBSQ HOSP IP/OBS HIGH 50: CPT | Performed by: SURGERY

## 2019-03-26 PROCEDURE — 6370000000 HC RX 637 (ALT 250 FOR IP): Performed by: INTERNAL MEDICINE

## 2019-03-26 PROCEDURE — 94761 N-INVAS EAR/PLS OXIMETRY MLT: CPT

## 2019-03-26 PROCEDURE — 94150 VITAL CAPACITY TEST: CPT

## 2019-03-26 PROCEDURE — 85025 COMPLETE CBC W/AUTO DIFF WBC: CPT

## 2019-03-26 PROCEDURE — 1200000000 HC SEMI PRIVATE

## 2019-03-26 PROCEDURE — 80048 BASIC METABOLIC PNL TOTAL CA: CPT

## 2019-03-26 RX ORDER — MORPHINE SULFATE 2 MG/ML
2 INJECTION, SOLUTION INTRAMUSCULAR; INTRAVENOUS EVERY 4 HOURS PRN
Status: DISCONTINUED | OUTPATIENT
Start: 2019-03-26 | End: 2019-03-27

## 2019-03-26 RX ORDER — MORPHINE SULFATE 2 MG/ML
2 INJECTION, SOLUTION INTRAMUSCULAR; INTRAVENOUS EVERY 4 HOURS PRN
Status: DISCONTINUED | OUTPATIENT
Start: 2019-03-26 | End: 2019-03-26

## 2019-03-26 RX ORDER — MORPHINE SULFATE 2 MG/ML
1 INJECTION, SOLUTION INTRAMUSCULAR; INTRAVENOUS EVERY 4 HOURS PRN
Status: DISCONTINUED | OUTPATIENT
Start: 2019-03-26 | End: 2019-03-27

## 2019-03-26 RX ADMIN — MORPHINE SULFATE 2 MG: 2 INJECTION, SOLUTION INTRAMUSCULAR; INTRAVENOUS at 23:11

## 2019-03-26 RX ADMIN — ONDANSETRON 4 MG: 2 INJECTION INTRAMUSCULAR; INTRAVENOUS at 10:37

## 2019-03-26 RX ADMIN — MORPHINE SULFATE 2 MG: 2 INJECTION, SOLUTION INTRAMUSCULAR; INTRAVENOUS at 18:37

## 2019-03-26 RX ADMIN — MORPHINE SULFATE 2 MG: 2 INJECTION, SOLUTION INTRAMUSCULAR; INTRAVENOUS at 03:07

## 2019-03-26 RX ADMIN — Medication 10 ML: at 22:03

## 2019-03-26 RX ADMIN — ENOXAPARIN SODIUM 40 MG: 40 INJECTION SUBCUTANEOUS at 10:01

## 2019-03-26 RX ADMIN — MORPHINE SULFATE 2 MG: 2 INJECTION, SOLUTION INTRAMUSCULAR; INTRAVENOUS at 10:37

## 2019-03-26 RX ADMIN — BUDESONIDE 9 MG: 3 CAPSULE, GELATIN COATED ORAL at 10:02

## 2019-03-26 RX ADMIN — Medication 10 ML: at 10:01

## 2019-03-26 ASSESSMENT — PAIN SCALES - GENERAL
PAINLEVEL_OUTOF10: 7

## 2019-03-26 NOTE — PROGRESS NOTES
Surgery Daily Progress Note      CC: Crohn's flare up    Subjective :  Pain is improving. Patient tolerates clear liquid diet. Denies nausea or vomiting. Pt has multiple loose stools. Objective     Exam:  Vitals:    03/25/19 1921 03/25/19 2132 03/25/19 2320 03/26/19 0221   BP: 109/69 100/63 102/67 121/74   Pulse: 82 96 67 73   Resp: 16 16 16 16   Temp: 97.9 °F (36.6 °C) 98 °F (36.7 °C) 97.8 °F (36.6 °C) 98.7 °F (37.1 °C)   TempSrc: Oral Oral Oral Oral   SpO2: 99% 100% 98% 100%   Weight:       Height:           General appearance: alert, no acute distress, grooming appropriate  Chest/Lungs: CTAB, no crackles/rales, wheezes/rhonchi, normal effort  Cardiovascular: RRR, no murmurs/gallops/rubs  Abdomen: soft, mildly-tender, non-distended, +BS, no guarding/rigidity  Extremities: no edema, no cyanosis  Neuro: A&Ox3, no focal deficits, sensation intact      ASSESSMENT/PLAN:   This is a 35 y.o. female  with past medical history of Crohn's and pelvic abscess who was recently admitted to [facility] secondary to hematemesis during which she underwent exploratory laparotomy with Dr. Osmani Brumfield on 3/7/19 and EGD with Dr Amrit Mayfield 3/11/2019 and was discharged on 3/14/2019. She presented to [place] [address] due to Crohn's flare up. Pt has bowel function. Obstruction unlikely.     - Defer management per GI  - OK to advance diet per surgery standpoint  - No surgical intervention      Angeles Romo MD  PGY-1 General Surgery  03/26/19  5:52 AM  [phone]

## 2019-03-26 NOTE — PLAN OF CARE
Problem: Pain:  Goal: Control of acute pain  Description  Control of acute pain  Outcome: Ongoing  Note:   Patient has complained of pain once so far this shift. Medication was administered (see mar). Will continue to monitor. Problem: Nausea/Vomiting:  Goal: Able to drink  Description  Able to drink  Outcome: Ongoing  Note:   Patient is tolerating fluids well,  diet has been advanced to a full liquid diet. Will continue to monitor.

## 2019-03-26 NOTE — PROGRESS NOTES
Patient is alert and oriented with stable VS.  Pt is UAL with a steady gait. Pt has been unable to have a BM large enough to test for C Diff. Pt is tolerating diet well. Will continue to monitor.

## 2019-03-26 NOTE — CARE COORDINATION
Cm following DC plan, Gi consult recommendations, Surgery signed off no surgical interventions. No needs at DC to home.    Electronically signed by Mirtha Dooley RN on 3/26/2019 at 2:36 PM  [phone]

## 2019-03-26 NOTE — PROGRESS NOTES
Incentive Spirometry education and demonstration completed by Respiratory Therapy. Turning over to Nursing for routine follow-up. Minimum Predicted Vital Capacity - 850 mL. Patient's Actual Vital Capacity - 1250 mL.

## 2019-03-26 NOTE — PROGRESS NOTES
Hospitalist Progress Note      PCP: No primary care provider on file. Date of Admission: 3/24/2019    Chief Complaint: abd pain    Hospital Course: Admitted for crohn disease flare, on budesonide     Subjective:  Pt feeling better. Nausea/diarrhea better. Abd pain improving      Medications:  Reviewed    Infusion Medications   Scheduled Medications    sodium chloride flush  10 mL Intravenous 2 times per day    enoxaparin  40 mg Subcutaneous Daily    budesonide  9 mg Oral Daily     PRN Meds: morphine, sodium chloride flush, magnesium hydroxide, ondansetron, potassium chloride **OR** potassium alternative oral replacement **OR** potassium chloride, magnesium sulfate, acetaminophen, promethazine      Intake/Output Summary (Last 24 hours) at 3/26/2019 0928  Last data filed at 3/26/2019 0238  Gross per 24 hour   Intake --   Output 3170 ml   Net -3170 ml       Physical Exam Performed:    /74   Pulse 73   Temp 98.7 °F (37.1 °C) (Oral)   Resp 16   Ht 5' 5\" (1.651 m)   Wt 120 lb (54.4 kg)   SpO2 100%   BMI 19.97 kg/m²     General appearance:  No apparent distress, appears stated age and cooperative. HEENT:  Normal cephalic, atraumatic without obvious deformity. Pupils equal, round, and reactive to light. Extra ocular muscles intact. Conjunctivae/corneas clear. Neck: Supple, with full range of motion. No jugular venous distention. Trachea midline. Respiratory:  Normal respiratory effort. Clear to auscultation, bilaterally without Rales/Wheezes/Rhonchi. Cardiovascular:  Regular rate and rhythm with normal S1/S2 without murmurs, rubs or gallops. Abdomen: RLQ tenderness improving, non-distended with normal bowel sounds. Laparoscopy healed scar marks noted  Musculoskeletal:  No clubbing, cyanosis or edema bilaterally. Full range of motion without deformity. Skin: Skin color, texture, turgor normal.  No rashes or lesions.   Neurologic:  Neurovascularly intact without any focal sensory/motor deficits. Cranial nerves: II-XII intact, grossly non-focal.  Psychiatric:  Alert and oriented, thought content appropriate, normal insight  Capillary Refill: Brisk,< 3 seconds   Peripheral Pulses: +2 palpable, equal bilaterally           Labs:   Recent Labs     03/24/19 2037 03/25/19  1001 03/26/19  0703   WBC 10.8 6.3 4.1   HGB 14.3 10.7* 10.6*   HCT 43.7 33.0* 32.8*    313 291     Recent Labs     03/24/19 2037 03/25/19  0626 03/26/19  0703   * 139 139   K 4.3 3.8 4.2   CL 99 105 105   CO2 21 22 24   BUN 8 6* <2*   CREATININE <0.5* <0.5* <0.5*   CALCIUM 9.1 8.4 8.6     Recent Labs     03/24/19 2037   AST 27   ALT 15   BILIDIR <0.2   BILITOT 0.7   ALKPHOS 64     No results for input(s): INR in the last 72 hours. No results for input(s): Terryann Haste in the last 72 hours. Urinalysis:      Lab Results   Component Value Date    NITRU Negative 03/24/2019    WBCUA 0-2 03/24/2019    BACTERIA Rare 03/24/2019    RBCUA 0-2 03/24/2019    BLOODU Negative 03/24/2019    SPECGRAV >=1.030 03/24/2019    GLUCOSEU Negative 03/24/2019       Radiology:  CT ABDOMEN PELVIS W IV CONTRAST Additional Contrast? None   Final Result   1. Colon is underdistended and may be diffusely thickened. There are    some thickened distal small bowel loops. Findings suggest active    inflammatory bowel disease. 2.  Some mildly distended proximal small bowel loops. Evolving    obstruction not excluded. 3.  Appendix not identified.                   Assessment/Plan:    Abd pain/N/V/Diarrhea: improving  Secondary to Crohn's Disease Flare  Continue Pain Medication P.R.N.  C. Diff pending  IVF discontinued  Advanced to full liquid diet today     Crohn's Disease with Flare:  Secondary to not being on medication  Patient in the process of getting medication approved by her insurance  Continue budesonide  GI following     Iron deficiency anemia:  Receives IV iron infusion outpatient  Does not tolerate PO iron  IV venofer X1 given  Monitor H&H  Transfuse if Hgb <7g/dl     Hx of latent TB:  Had treatment with INH for 6 months  Cleared by ID last hospitalization, no active TB        DVT Prophylaxis: Lovenox  Diet: DIET Full LIQUID;  Code Status: Full Code     PT/OT Eval Status:  At her baseline     Dispo - Anticipate discharge in 1-2 days         Allen Davies MD

## 2019-03-26 NOTE — PROGRESS NOTES
<0.5* <0.5*     Recent Labs     03/24/19 2037   AST 27   ALT 15   BILIDIR <0.2   BILITOT 0.7   ALKPHOS 64       Assessment:       Active Problems:    Abdominal pain    Acute Crohn's disease with complication ([address]  Resolved Problems:    * No resolved hospital problems.  *      Stable,  tolerating clears    Recommendations:       Uceris  Follow up stool studies  Full liquids OK  IV iron    [facility]  3/26/2019  7:33 AM

## 2019-03-26 NOTE — PLAN OF CARE
Problem: Pain:  Goal: Pain level will decrease  Description  Pain level will decrease  Outcome: Ongoing  Note:   Patient encouraged to use call light to notify staff when pain rises beyond tolerable. C/o 8/10 pain; given PRN pain meds w/ relief. Patient educated on pain scale and was using call light appropriately. Problem: Nausea/Vomiting:  Goal: Absence of nausea/vomiting  Description  Absence of nausea/vomiting  3/26/2019 0432 by Conrad Selby RN  Outcome: Ongoing  Note:   C/o of nausea- given PRN phenergan per orders. No emesis throughout shift. 3/25/2019 1715 by Jim Pratt RN  Note:   Patient complained of Nausea several times this shift. Antiemetics were given (see mar). No emesis so far. Will continue to monitor. 3/25/2019 1712 by Jim Pratt RN  Note:   Pt complained of nausea several times this shift (no emesis). Antiemetic was given (see mar). Will continue to monitor.

## 2019-03-27 VITALS
RESPIRATION RATE: 16 BRPM | SYSTOLIC BLOOD PRESSURE: 107 MMHG | OXYGEN SATURATION: 97 % | WEIGHT: 120 LBS | TEMPERATURE: 97.4 F | DIASTOLIC BLOOD PRESSURE: 71 MMHG | HEART RATE: 82 BPM | BODY MASS INDEX: 19.99 KG/M2 | HEIGHT: 65 IN

## 2019-03-27 LAB
ALBUMIN SERPL-MCNC: 3.8 G/DL (ref 3.4–5)
ANION GAP SERPL CALCULATED.3IONS-SCNC: 10 MMOL/L (ref 3–16)
ANISOCYTOSIS: ABNORMAL
BASOPHILS ABSOLUTE: 0 K/UL (ref 0–0.2)
BASOPHILS RELATIVE PERCENT: 0.2 %
BUN BLDV-MCNC: <2 MG/DL (ref 7–20)
C DIFFICILE TOXIN, EIA: NORMAL
CALCIUM SERPL-MCNC: 8.8 MG/DL (ref 8.3–10.6)
CHLORIDE BLD-SCNC: 106 MMOL/L (ref 99–110)
CO2: 26 MMOL/L (ref 21–32)
CREAT SERPL-MCNC: <0.5 MG/DL (ref 0.6–1.1)
EOSINOPHILS ABSOLUTE: 0.1 K/UL (ref 0–0.6)
EOSINOPHILS RELATIVE PERCENT: 2 %
GFR AFRICAN AMERICAN: >60
GFR NON-AFRICAN AMERICAN: >60
GLUCOSE BLD-MCNC: 86 MG/DL (ref 70–99)
HCT VFR BLD CALC: 33.8 % (ref 36–48)
HEMOGLOBIN: 11 G/DL (ref 12–16)
HYPOCHROMIA: ABNORMAL
LYMPHOCYTES ABSOLUTE: 1.5 K/UL (ref 1–5.1)
LYMPHOCYTES RELATIVE PERCENT: 23.3 %
MAGNESIUM: 2 MG/DL (ref 1.8–2.4)
MCH RBC QN AUTO: 22.5 PG (ref 26–34)
MCHC RBC AUTO-ENTMCNC: 32.5 G/DL (ref 31–36)
MCV RBC AUTO: 69.2 FL (ref 80–100)
MICROCYTES: ABNORMAL
MONOCYTES ABSOLUTE: 0.4 K/UL (ref 0–1.3)
MONOCYTES RELATIVE PERCENT: 6.1 %
NEUTROPHILS ABSOLUTE: 4.4 K/UL (ref 1.7–7.7)
NEUTROPHILS RELATIVE PERCENT: 68.4 %
PDW BLD-RTO: 40 % (ref 12.4–15.4)
PHOSPHORUS: 3.1 MG/DL (ref 2.5–4.9)
PLATELET # BLD: 288 K/UL (ref 135–450)
PMV BLD AUTO: 8.8 FL (ref 5–10.5)
POTASSIUM SERPL-SCNC: 4 MMOL/L (ref 3.5–5.1)
RBC # BLD: 4.88 M/UL (ref 4–5.2)
SODIUM BLD-SCNC: 142 MMOL/L (ref 136–145)
TARGET CELLS: ABNORMAL
WBC # BLD: 6.5 K/UL (ref 4–11)

## 2019-03-27 PROCEDURE — 80069 RENAL FUNCTION PANEL: CPT

## 2019-03-27 PROCEDURE — 83735 ASSAY OF MAGNESIUM: CPT

## 2019-03-27 PROCEDURE — 85025 COMPLETE CBC W/AUTO DIFF WBC: CPT

## 2019-03-27 PROCEDURE — 2580000003 HC RX 258: Performed by: INTERNAL MEDICINE

## 2019-03-27 PROCEDURE — 36415 COLL VENOUS BLD VENIPUNCTURE: CPT

## 2019-03-27 PROCEDURE — 6370000000 HC RX 637 (ALT 250 FOR IP): Performed by: INTERNAL MEDICINE

## 2019-03-27 PROCEDURE — 6360000002 HC RX W HCPCS: Performed by: INTERNAL MEDICINE

## 2019-03-27 RX ORDER — BUDESONIDE 3 MG/1
9 CAPSULE, COATED PELLETS ORAL DAILY
Qty: 90 CAPSULE | Refills: 0 | Status: SHIPPED | OUTPATIENT
Start: 2019-03-28 | End: 2019-04-27

## 2019-03-27 RX ORDER — HYDROCODONE BITARTRATE AND ACETAMINOPHEN 5; 325 MG/1; MG/1
1 TABLET ORAL EVERY 8 HOURS PRN
Qty: 9 TABLET | Refills: 0 | Status: SHIPPED | OUTPATIENT
Start: 2019-03-27 | End: 2019-03-30

## 2019-03-27 RX ORDER — HYDROCODONE BITARTRATE AND ACETAMINOPHEN 5; 325 MG/1; MG/1
2 TABLET ORAL EVERY 4 HOURS PRN
Status: DISCONTINUED | OUTPATIENT
Start: 2019-03-27 | End: 2019-03-27 | Stop reason: HOSPADM

## 2019-03-27 RX ORDER — HYDROCODONE BITARTRATE AND ACETAMINOPHEN 5; 325 MG/1; MG/1
1 TABLET ORAL EVERY 4 HOURS PRN
Status: DISCONTINUED | OUTPATIENT
Start: 2019-03-27 | End: 2019-03-27 | Stop reason: HOSPADM

## 2019-03-27 RX ADMIN — BUDESONIDE 9 MG: 3 CAPSULE, GELATIN COATED ORAL at 09:58

## 2019-03-27 RX ADMIN — PROMETHAZINE HYDROCHLORIDE 25 MG: 25 TABLET ORAL at 04:37

## 2019-03-27 RX ADMIN — Medication 10 ML: at 09:58

## 2019-03-27 RX ADMIN — ENOXAPARIN SODIUM 40 MG: 40 INJECTION SUBCUTANEOUS at 09:58

## 2019-03-27 NOTE — DISCHARGE SUMMARY
Hospital Medicine Discharge Summary    Patient ID: Yvonne Martin      Patient's PCP: No primary care provider on file. Admit Date: 3/24/2019     Discharge Date: 3/27/2019  03/27/19    Admitting Physician: Melody Blackburn MD     Discharge Physician: Isaiah Hernandez MD     Discharge Diagnoses: Active Hospital Problems    Diagnosis Date Noted    Abdominal pain [R10.9] 03/25/2019    Acute Crohn's disease with complication ([address] [W81.499]        The patient was seen and examined on day of discharge and this discharge summary is in conjunction with any daily progress note from day of discharge. Hospital Course:   Abd pain/N/V/Diarrhea:  Secondary to Crohn's Disease Flare. Was given IVF, pain medication prn. Diet was started and advanced once pt was able to tolerate it.      Crohn's Disease with Flare:  Secondary to not being on medication. Patient was in the process of getting medication approved by her insurance. GI was consulted and budesonide was started. Pt's symptoms improved. Pt had negative hep A ab. Hep A/Hep B vaccine was given. GI recommended continuing budesonide on discharge and follow up with Dr. Maycol Quintana to be started on biologic agent.      Iron deficiency anemia/HbC trait:  Receives IV iron infusion outpatient. Does not tolerate PO iron. IV venofer X1 was given. Pt was also noted to have HbC trait. Advised pt to continue with outpatient venofer infusions and follow up with PCP     Hx of latent TB:  Had treatment with INH for 6 months. Cleared by ID last hospitalization, no active TB      Physical Exam Performed:     /71   Pulse 82   Temp 97.4 °F (36.3 °C) (Oral)   Resp 16   Ht 5' 5\" (1.651 m)   Wt 120 lb (54.4 kg)   SpO2 97%   BMI 19.97 kg/m²       General appearance:  No apparent distress, appears stated age and cooperative. HEENT:  Normal cephalic, atraumatic without obvious deformity. Pupils equal, round, and reactive to light. Extra ocular muscles intact. Conjunctivae/corneas clear. Neck: Supple, with full range of motion. No jugular venous distention. Trachea midline. Respiratory:  Normal respiratory effort. Clear to auscultation, bilaterally without Rales/Wheezes/Rhonchi. Cardiovascular:  Regular rate and rhythm with normal S1/S2 without murmurs, rubs or gallops. Abdomen: Soft, non-tender, non-distended with normal bowel sounds. Musculoskeletal:  No clubbing, cyanosis or edema bilaterally. Full range of motion without deformity. Skin: Skin color, texture, turgor normal.  No rashes or lesions. Neurologic:  Neurovascularly intact without any focal sensory/motor deficits. Cranial nerves: II-XII intact, grossly non-focal.  Psychiatric:  Alert and oriented, thought content appropriate, normal insight  Capillary Refill: Brisk,< 3 seconds   Peripheral Pulses: +2 palpable, equal bilaterally       Labs: For convenience and continuity at follow-up the following most recent labs are provided:      CBC:    Lab Results   Component Value Date    WBC 6.5 03/27/2019    HGB 11.0 03/27/2019    HCT 33.8 03/27/2019     03/27/2019       Renal:    Lab Results   Component Value Date     03/27/2019    K 4.0 03/27/2019    K 4.2 03/26/2019     03/27/2019    CO2 26 03/27/2019    BUN <2 03/27/2019    CREATININE <0.5 03/27/2019    CALCIUM 8.8 03/27/2019    PHOS 3.1 03/27/2019         Significant Diagnostic Studies    Radiology:   CT ABDOMEN PELVIS W IV CONTRAST Additional Contrast? None   Final Result   1. Colon is underdistended and may be diffusely thickened. There are    some thickened distal small bowel loops. Findings suggest active    inflammatory bowel disease. 2.  Some mildly distended proximal small bowel loops. Evolving    obstruction not excluded. 3.  Appendix not identified.                  Consults:     IP CONSULT TO HOSPITALIST  IP CONSULT TO GI  IP CONSULT TO GENERAL SURGERY    Disposition: Home    Condition at Discharge: Stable    Discharge Instructions/Follow-up:  F/u with GI and PCP    Code Status:  Full Code     Activity: activity as tolerated    Diet: low residue diet      Discharge Medications:     Discharge Medication List as of 3/27/2019  2:26 PM           Details   HYDROcodone-acetaminophen (NORCO) 5-325 MG per tablet Take 1 tablet by mouth every 8 hours as needed for Pain for up to 3 days. , Disp-9 tablet, R-0Print      budesonide (ENTOCORT EC) 3 MG extended release capsule Take 3 capsules by mouth daily, Disp-90 capsule, R-0Print              Details   promethazine (PHENERGAN) 25 MG tablet Take 25 mg by mouth every 6 hours as needed for NauseaHistorical Med      omeprazole (PRILOSEC) 20 MG delayed release capsule Take 20 mg by mouth dailyHistorical Med             Time Spent on discharge is more than 30 minutes in the examination, evaluation, counseling and review of medications and discharge plan. Signed:    Radha Vigil MD   3/27/2019      Thank you No primary care provider on file. for the opportunity to be involved in this patient's care. If you have any questions or concerns please feel free to contact me at [phone].

## 2019-03-27 NOTE — PROGRESS NOTES
VSS, a/o x4. Uneventful night for patient; pain is improving: rating as 7/10. Minimal nausea throughout shift, tolerating diet. Unable to get stool sample as patient has not had BM. Resting comfortably in bed w/ eyes closed at this time. Will cont to monitor.

## 2019-03-27 NOTE — PROGRESS NOTES
[facility]  GI Progress Note        Loida Luque is a 35 y.o. female patient. 1. Acute Crohn's disease with complication ([address]        Admit Date: 3/24/2019    Subjective:       Abd pain less tolerating full liquids well , no N/V    Scheduled Meds:   sodium chloride flush  10 mL Intravenous 2 times per day    enoxaparin  40 mg Subcutaneous Daily    budesonide  9 mg Oral Daily       PRN Meds:  morphine **OR** morphine, sodium chloride flush, magnesium hydroxide, ondansetron, potassium chloride **OR** potassium alternative oral replacement **OR** potassium chloride, magnesium sulfate, acetaminophen, promethazine      Objective:       Patient Vitals for the past 24 hrs:   BP Temp Temp src Pulse Resp SpO2   19 0321 98/65 98.6 °F (37 °C) Oral 74 16 99 %   19 2308 (!) 100/59 98.4 °F (36.9 °C) Oral 70 16 100 %   19 1958 98/68 98.2 °F (36.8 °C) Oral 82 16 98 %   19 1442 -- -- -- -- 16 99 %   19 1221 109/69 98.3 °F (36.8 °C) Oral 67 16 100 %   19 0910 112/68 97.7 °F (36.5 °C) Oral 76 16 100 %       Exam:  VITALS:  BP 98/65   Pulse 74   Temp 98.6 °F (37 °C) (Oral)   Resp 16   Ht 5' 5\" (1.651 m)   Wt 120 lb (54.4 kg)   SpO2 99%   BMI 19.97 kg/m²   TEMPERATURE:  Current - Temp: 98.6 °F (37 °C); Max - Temp  Av.2 °F (36.8 °C)  Min: 97.7 °F (36.5 °C)  Max: 98.6 °F (37 °C)    NAD  General appearance: alert, appears stated age, cooperative and no distress  Abdomen: soft ND mild lower tend no G/R/M  AAOx3, No asterixis or encephalopathy  Extremities: No edema.       Recent Labs     19  1001 19  0703 19  0656   WBC 6.3 4.1 6.5   HGB 10.7* 10.6* 11.0*   HCT 33.0* 32.8* 33.8*   MCV 68.2* 69.4* 69.2*    291 288     Recent Labs     19  0626 19  0703 19  0656    139 142   K 3.8 4.2 4.0    105 106   CO2 22 24 26   PHOS  --   --  3.1   BUN 6* <2* <2*   CREATININE <0.5* <0.5* <0.5*     Recent Labs 03/24/19 2037   AST 27   ALT 15   BILIDIR <0.2   BILITOT 0.7   ALKPHOS 64     Recent Labs     03/24/19 2037   LIPASE 13.0     Recent Labs     03/24/19 2037   PROT 8.2       Assessment:       Active Problems:    Abdominal pain    Acute Crohn's disease with complication ([facility]  Resolved Problems:    * No resolved hospital problems. 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

## 2019-03-27 NOTE — PROGRESS NOTES
Patient is alert and oriented times 4 with stable VS.  Pt was given prescriptions (meds to beds) and acknowledged an understanding of how to take the prescriptions and the side effects. Pt was given discharge instructions and acknowledged understanding of the instructions. IV was removed with catheter intact. Patient ambulated self with son to private vehicle for discharge home. Sakina Casas

## 2019-03-27 NOTE — CARE COORDINATION
Case Management Discharge Assessment  Pt worried about medication cost, meds filled Meds to beds with $0 co-pay, pt DC home with family no other needs. 3/27/2019  [facility] Case Management Department    Patient: Edmond Montez  MRN: [id number] / : 1980  ACCT: [de-identified]          Admission Documentation  Attending Provider: Pablito Giraldo MD  Admit date/time: 3/24/2019  8:03 PM  Status: Inpatient [101]  Diagnosis: Abdominal pain     Readmission within last 30 days:  yes     Living Situation  Discharge Planning  Living Arrangements: Children  Support Systems: Family Members, Children  Potential Assistance Needed: N/A  Type of Home Care Services: None  Patient expects to be discharged to[de-identified] home    Service Assessment:       Values / Beliefs  Do you have any ethnic, cultural, sacramental, or spiritual Gnosticism needs you would like us to be aware of while you are in the hospital?: No    Advance Directives (For Healthcare)  Pre-existing DNR Comfort Care/DNR Arrest/DNI Order: No  Healthcare Directive: No, patient does not have an advance directive for healthcare treatment  Information on Healthcare Directives Requested: No  Patient Requests Assistance: No  Advance Directives: Pt. not interested at this time                        Pharmacy: [facility] meds to beds (Budesomide covered 100% with no co-pay)   Potential Assistance Purchasing Medications:  No  Does patient want to participate in local refill/meds to beds program?: No    Notification completed in HENS/PAS? No     IMM  No       Discharge disposition: Home     LOC Home with no needs      Mode of Transport Private car family to drive      Kaylee Pitt and her family were provided with choice of provider; she and her family are in agreement with the discharge plan.       Care Transitions patient: No    Carlitos Price RN  [facility]  Case Management Department  Ph: [phone] Fax: [phone]

## 2019-09-11 ENCOUNTER — HOSPITAL ENCOUNTER (INPATIENT)
Age: 39
LOS: 7 days | Discharge: HOME | DRG: 245 | End: 2019-09-18
Attending: EMERGENCY MEDICINE | Admitting: INTERNAL MEDICINE
Payer: MEDICAID

## 2019-09-11 ENCOUNTER — APPOINTMENT (OUTPATIENT)
Dept: CT IMAGING | Age: 39
DRG: 245 | End: 2019-09-11
Payer: MEDICAID

## 2019-09-11 DIAGNOSIS — K50.914 CROHN'S DISEASE WITH ABSCESS, UNSPECIFIED GASTROINTESTINAL TRACT LOCATION (HCC): Primary | ICD-10-CM

## 2019-09-11 PROBLEM — K50.114 CROHN'S COLITIS, WITH ABSCESS (HCC): Status: ACTIVE | Noted: 2019-09-11

## 2019-09-11 LAB
A/G RATIO: 1.1 (ref 1.1–2.2)
ALBUMIN SERPL-MCNC: 4 G/DL (ref 3.4–5)
ALP BLD-CCNC: 48 U/L (ref 40–129)
ALT SERPL-CCNC: 6 U/L (ref 10–40)
ANION GAP SERPL CALCULATED.3IONS-SCNC: 12 MMOL/L (ref 3–16)
AST SERPL-CCNC: 12 U/L (ref 15–37)
BACTERIA: ABNORMAL /HPF
BASOPHILS ABSOLUTE: 0.1 K/UL (ref 0–0.2)
BASOPHILS RELATIVE PERCENT: 0.5 %
BILIRUB SERPL-MCNC: 0.4 MG/DL (ref 0–1)
BILIRUBIN URINE: NEGATIVE
BLOOD, URINE: ABNORMAL
BUN BLDV-MCNC: 4 MG/DL (ref 7–20)
CALCIUM SERPL-MCNC: 8.8 MG/DL (ref 8.3–10.6)
CHLORIDE BLD-SCNC: 103 MMOL/L (ref 99–110)
CLARITY: CLEAR
CO2: 24 MMOL/L (ref 21–32)
COLOR: YELLOW
CREAT SERPL-MCNC: 0.6 MG/DL (ref 0.6–1.1)
EOSINOPHILS ABSOLUTE: 0.1 K/UL (ref 0–0.6)
EOSINOPHILS RELATIVE PERCENT: 0.6 %
EPITHELIAL CELLS, UA: ABNORMAL /HPF
GFR AFRICAN AMERICAN: >60
GFR NON-AFRICAN AMERICAN: >60
GLOBULIN: 3.7 G/DL
GLUCOSE BLD-MCNC: 95 MG/DL (ref 70–99)
GLUCOSE URINE: NEGATIVE MG/DL
HCG(URINE) PREGNANCY TEST: NEGATIVE
HCT VFR BLD CALC: 38.6 % (ref 36–48)
HEMOGLOBIN: 13.1 G/DL (ref 12–16)
KETONES, URINE: NEGATIVE MG/DL
LACTATE: 0.57 MMOL/L (ref 0.4–2)
LEUKOCYTE ESTERASE, URINE: NEGATIVE
LYMPHOCYTES ABSOLUTE: 0.9 K/UL (ref 1–5.1)
LYMPHOCYTES RELATIVE PERCENT: 8.7 %
MCH RBC QN AUTO: 26.5 PG (ref 26–34)
MCHC RBC AUTO-ENTMCNC: 33.9 G/DL (ref 31–36)
MCV RBC AUTO: 78.1 FL (ref 80–100)
MICROSCOPIC EXAMINATION: YES
MONOCYTES ABSOLUTE: 0.8 K/UL (ref 0–1.3)
MONOCYTES RELATIVE PERCENT: 7.5 %
MUCUS: ABNORMAL /LPF
NEUTROPHILS ABSOLUTE: 9 K/UL (ref 1.7–7.7)
NEUTROPHILS RELATIVE PERCENT: 82.7 %
NITRITE, URINE: NEGATIVE
PDW BLD-RTO: 16.1 % (ref 12.4–15.4)
PERFORMED ON: NORMAL
PH UA: 6 (ref 5–8)
PLATELET # BLD: 495 K/UL (ref 135–450)
PMV BLD AUTO: 7.9 FL (ref 5–10.5)
POC SAMPLE TYPE: NORMAL
POTASSIUM SERPL-SCNC: 3.7 MMOL/L (ref 3.5–5.1)
PROTEIN UA: ABNORMAL MG/DL
RBC # BLD: 4.94 M/UL (ref 4–5.2)
RBC UA: ABNORMAL /HPF (ref 0–2)
RENAL EPITHELIAL, UA: ABNORMAL /HPF
SODIUM BLD-SCNC: 139 MMOL/L (ref 136–145)
SPECIFIC GRAVITY UA: >=1.03 (ref 1–1.03)
TOTAL PROTEIN: 7.7 G/DL (ref 6.4–8.2)
URINE TYPE: ABNORMAL
UROBILINOGEN, URINE: 0.2 E.U./DL
WBC # BLD: 10.8 K/UL (ref 4–11)
WBC UA: ABNORMAL /HPF (ref 0–5)

## 2019-09-11 PROCEDURE — 85025 COMPLETE CBC W/AUTO DIFF WBC: CPT

## 2019-09-11 PROCEDURE — 96368 THER/DIAG CONCURRENT INF: CPT

## 2019-09-11 PROCEDURE — 96365 THER/PROPH/DIAG IV INF INIT: CPT

## 2019-09-11 PROCEDURE — 96375 TX/PRO/DX INJ NEW DRUG ADDON: CPT

## 2019-09-11 PROCEDURE — 36415 COLL VENOUS BLD VENIPUNCTURE: CPT

## 2019-09-11 PROCEDURE — 2500000003 HC RX 250 WO HCPCS: Performed by: STUDENT IN AN ORGANIZED HEALTH CARE EDUCATION/TRAINING PROGRAM

## 2019-09-11 PROCEDURE — 1200000000 HC SEMI PRIVATE

## 2019-09-11 PROCEDURE — 2580000003 HC RX 258: Performed by: PHYSICIAN ASSISTANT

## 2019-09-11 PROCEDURE — 6360000002 HC RX W HCPCS: Performed by: STUDENT IN AN ORGANIZED HEALTH CARE EDUCATION/TRAINING PROGRAM

## 2019-09-11 PROCEDURE — 81001 URINALYSIS AUTO W/SCOPE: CPT

## 2019-09-11 PROCEDURE — 84703 CHORIONIC GONADOTROPIN ASSAY: CPT

## 2019-09-11 PROCEDURE — 96376 TX/PRO/DX INJ SAME DRUG ADON: CPT

## 2019-09-11 PROCEDURE — 80053 COMPREHEN METABOLIC PANEL: CPT

## 2019-09-11 PROCEDURE — 6360000002 HC RX W HCPCS

## 2019-09-11 PROCEDURE — 96361 HYDRATE IV INFUSION ADD-ON: CPT

## 2019-09-11 PROCEDURE — 6360000002 HC RX W HCPCS: Performed by: PHYSICIAN ASSISTANT

## 2019-09-11 PROCEDURE — 2580000003 HC RX 258: Performed by: INTERNAL MEDICINE

## 2019-09-11 PROCEDURE — 6360000004 HC RX CONTRAST MEDICATION: Performed by: PHYSICIAN ASSISTANT

## 2019-09-11 PROCEDURE — 6360000002 HC RX W HCPCS: Performed by: EMERGENCY MEDICINE

## 2019-09-11 PROCEDURE — 99285 EMERGENCY DEPT VISIT HI MDM: CPT

## 2019-09-11 PROCEDURE — 83605 ASSAY OF LACTIC ACID: CPT

## 2019-09-11 PROCEDURE — 74177 CT ABD & PELVIS W/CONTRAST: CPT

## 2019-09-11 RX ORDER — ONDANSETRON 2 MG/ML
INJECTION INTRAMUSCULAR; INTRAVENOUS
Status: COMPLETED
Start: 2019-09-11 | End: 2019-09-11

## 2019-09-11 RX ORDER — MORPHINE SULFATE 2 MG/ML
2 INJECTION, SOLUTION INTRAMUSCULAR; INTRAVENOUS EVERY 4 HOURS PRN
Status: DISCONTINUED | OUTPATIENT
Start: 2019-09-11 | End: 2019-09-17

## 2019-09-11 RX ORDER — SODIUM CHLORIDE 0.9 % (FLUSH) 0.9 %
10 SYRINGE (ML) INJECTION PRN
Status: DISCONTINUED | OUTPATIENT
Start: 2019-09-11 | End: 2019-09-18 | Stop reason: HOSPADM

## 2019-09-11 RX ORDER — MORPHINE SULFATE 4 MG/ML
4 INJECTION, SOLUTION INTRAMUSCULAR; INTRAVENOUS ONCE
Status: COMPLETED | OUTPATIENT
Start: 2019-09-11 | End: 2019-09-11

## 2019-09-11 RX ORDER — ONDANSETRON 2 MG/ML
4 INJECTION INTRAMUSCULAR; INTRAVENOUS EVERY 6 HOURS PRN
Status: DISCONTINUED | OUTPATIENT
Start: 2019-09-11 | End: 2019-09-18 | Stop reason: HOSPADM

## 2019-09-11 RX ORDER — MORPHINE SULFATE 4 MG/ML
4 INJECTION, SOLUTION INTRAMUSCULAR; INTRAVENOUS EVERY 4 HOURS PRN
Status: DISCONTINUED | OUTPATIENT
Start: 2019-09-11 | End: 2019-09-11

## 2019-09-11 RX ORDER — ONDANSETRON 2 MG/ML
4 INJECTION INTRAMUSCULAR; INTRAVENOUS ONCE
Status: COMPLETED | OUTPATIENT
Start: 2019-09-11 | End: 2019-09-11

## 2019-09-11 RX ORDER — SODIUM CHLORIDE, SODIUM LACTATE, POTASSIUM CHLORIDE, CALCIUM CHLORIDE 600; 310; 30; 20 MG/100ML; MG/100ML; MG/100ML; MG/100ML
INJECTION, SOLUTION INTRAVENOUS CONTINUOUS
Status: DISCONTINUED | OUTPATIENT
Start: 2019-09-12 | End: 2019-09-14

## 2019-09-11 RX ORDER — MORPHINE SULFATE 2 MG/ML
2 INJECTION, SOLUTION INTRAMUSCULAR; INTRAVENOUS EVERY 4 HOURS PRN
Status: DISCONTINUED | OUTPATIENT
Start: 2019-09-11 | End: 2019-09-11

## 2019-09-11 RX ORDER — CIPROFLOXACIN 2 MG/ML
400 INJECTION, SOLUTION INTRAVENOUS EVERY 12 HOURS
Status: DISCONTINUED | OUTPATIENT
Start: 2019-09-12 | End: 2019-09-18 | Stop reason: HOSPADM

## 2019-09-11 RX ORDER — CIPROFLOXACIN 2 MG/ML
400 INJECTION, SOLUTION INTRAVENOUS ONCE
Status: COMPLETED | OUTPATIENT
Start: 2019-09-11 | End: 2019-09-11

## 2019-09-11 RX ORDER — MORPHINE SULFATE 4 MG/ML
4 INJECTION, SOLUTION INTRAMUSCULAR; INTRAVENOUS EVERY 4 HOURS PRN
Status: DISCONTINUED | OUTPATIENT
Start: 2019-09-11 | End: 2019-09-17

## 2019-09-11 RX ORDER — 0.9 % SODIUM CHLORIDE 0.9 %
1000 INTRAVENOUS SOLUTION INTRAVENOUS ONCE
Status: COMPLETED | OUTPATIENT
Start: 2019-09-11 | End: 2019-09-11

## 2019-09-11 RX ORDER — PROMETHAZINE HYDROCHLORIDE 25 MG/ML
12.5 INJECTION, SOLUTION INTRAMUSCULAR; INTRAVENOUS ONCE
Status: COMPLETED | OUTPATIENT
Start: 2019-09-11 | End: 2019-09-11

## 2019-09-11 RX ORDER — KETOROLAC TROMETHAMINE 30 MG/ML
15 INJECTION, SOLUTION INTRAMUSCULAR; INTRAVENOUS ONCE
Status: COMPLETED | OUTPATIENT
Start: 2019-09-11 | End: 2019-09-11

## 2019-09-11 RX ORDER — ONDANSETRON 2 MG/ML
8 INJECTION INTRAMUSCULAR; INTRAVENOUS ONCE
Status: COMPLETED | OUTPATIENT
Start: 2019-09-11 | End: 2019-09-11

## 2019-09-11 RX ORDER — SODIUM CHLORIDE 0.9 % (FLUSH) 0.9 %
10 SYRINGE (ML) INJECTION EVERY 12 HOURS SCHEDULED
Status: DISCONTINUED | OUTPATIENT
Start: 2019-09-11 | End: 2019-09-18 | Stop reason: HOSPADM

## 2019-09-11 RX ADMIN — HYDROMORPHONE HYDROCHLORIDE 0.5 MG: 1 INJECTION, SOLUTION INTRAMUSCULAR; INTRAVENOUS; SUBCUTANEOUS at 19:06

## 2019-09-11 RX ADMIN — HYDROMORPHONE HYDROCHLORIDE 1 MG: 1 INJECTION, SOLUTION INTRAMUSCULAR; INTRAVENOUS; SUBCUTANEOUS at 18:09

## 2019-09-11 RX ADMIN — ONDANSETRON 4 MG: 2 INJECTION INTRAMUSCULAR; INTRAVENOUS at 21:16

## 2019-09-11 RX ADMIN — ONDANSETRON 8 MG: 2 INJECTION INTRAMUSCULAR; INTRAVENOUS at 16:57

## 2019-09-11 RX ADMIN — SODIUM CHLORIDE 1000 ML: 9 INJECTION, SOLUTION INTRAVENOUS at 17:03

## 2019-09-11 RX ADMIN — HYDROMORPHONE HYDROCHLORIDE 0.5 MG: 1 INJECTION, SOLUTION INTRAMUSCULAR; INTRAVENOUS; SUBCUTANEOUS at 21:16

## 2019-09-11 RX ADMIN — Medication 0.5 MG: at 21:16

## 2019-09-11 RX ADMIN — MORPHINE SULFATE 4 MG: 4 INJECTION, SOLUTION INTRAMUSCULAR; INTRAVENOUS at 17:03

## 2019-09-11 RX ADMIN — SODIUM CHLORIDE, POTASSIUM CHLORIDE, SODIUM LACTATE AND CALCIUM CHLORIDE: 600; 310; 30; 20 INJECTION, SOLUTION INTRAVENOUS at 23:49

## 2019-09-11 RX ADMIN — IOPAMIDOL 80 ML: 755 INJECTION, SOLUTION INTRAVENOUS at 17:40

## 2019-09-11 RX ADMIN — Medication 10 ML: at 23:48

## 2019-09-11 RX ADMIN — CIPROFLOXACIN 400 MG: 2 INJECTION, SOLUTION INTRAVENOUS at 21:05

## 2019-09-11 RX ADMIN — PROMETHAZINE HYDROCHLORIDE 12.5 MG: 25 INJECTION INTRAMUSCULAR; INTRAVENOUS at 19:05

## 2019-09-11 RX ADMIN — METRONIDAZOLE 500 MG: 500 INJECTION, SOLUTION INTRAVENOUS at 21:16

## 2019-09-11 RX ADMIN — KETOROLAC TROMETHAMINE 15 MG: 30 INJECTION, SOLUTION INTRAMUSCULAR at 16:58

## 2019-09-11 ASSESSMENT — PAIN SCALES - GENERAL
PAINLEVEL_OUTOF10: 10
PAINLEVEL_OUTOF10: 10
PAINLEVEL_OUTOF10: 8
PAINLEVEL_OUTOF10: 10
PAINLEVEL_OUTOF10: 8
PAINLEVEL_OUTOF10: 10

## 2019-09-11 ASSESSMENT — PAIN DESCRIPTION - DESCRIPTORS: DESCRIPTORS: ACHING

## 2019-09-11 ASSESSMENT — PAIN DESCRIPTION - PAIN TYPE: TYPE: ACUTE PAIN

## 2019-09-11 ASSESSMENT — PAIN DESCRIPTION - LOCATION: LOCATION: ABDOMEN

## 2019-09-11 ASSESSMENT — PAIN DESCRIPTION - FREQUENCY: FREQUENCY: CONTINUOUS

## 2019-09-11 NOTE — ED PROVIDER NOTES
ED Note  Addendum    Halina Delacruz is a 35 y.o. female patient who presented to the emergency department. This patient was initially seen by BETTY Thakkar, an off-going provider. Please see that provider's note for details regarding the initial history, physical exam and ED course. In brief, this is a 35 y.o. female who presented with abdominal pain. Care of this patient was signed out to me. At the time of turnover the following steps in the patient's evaluation were pending: Surgery recommendations, reassessment and disposition. Abdominal CT scan showed the findings as below:  1. Findings consistent with Crohn's disease with a severely inflamed loop of distal ileum with multiple enteroenteric fistulas and extraluminal phlegmon or abscess in the right anterior pelvis measuring 1.5 cm in size. This results in a focal   inflammatory stricture causing moderate to severe distention of the bowel loop just proximal to this bowel loop measuring up to 4.5 cm in size. 2.  Additional skip short segmental areas of inflammatory bowel involve the ileum. 3. Mild free fluid in the pelvis. I discussed the case with gastroenterology who recommended the patient be placed on ciprofloxacin and Flagyl, they recommended no steroids at this time given that the patient has an abdominal abscess, they recommended surgery consultation and admission to the hospital.  The patient was started on ciprofloxacin and Flagyl IV. Patient was given additional Dilaudid and Zofran for pain and nausea. I discussed the case with surgery who recommended admission to the hospitalist but they do not recommend any surgical intervention at this time. I discussed the case with medicine who accepted the patient for admission. At this time the patient has been admitted to medicine for further evaluation and management. All results were discussed with the patient at length, concerns were addressed and all questions answered.  Risks,
control has been difficult to achieve but she is no longer experiencing severe nausea or vomiting. If we are unable to contact her surgeon in a regional amount of time we will contact the team at [facility] given her stricture and abscess formation. The oncoming provider will follow up. This patient was also evaluated by the attending physician. All care plans were discussed and agreed upon. Clinical Impression     1. Crohn's disease with abscess, unspecified gastrointestinal tract location [facility])        Disposition     PATIENT REFERRED TO:  No follow-up provider specified.     DISCHARGE MEDICATIONS:  New Prescriptions    No medications on file       [id number] Currie PA-C  09/11/19 5553

## 2019-09-11 NOTE — ED TRIAGE NOTES
Hx crohns. Generalized bdominal pain x 5 days. +N/V. Denies diarrhea, +constipation.  -fever, SOB/CP

## 2019-09-12 LAB
ANION GAP SERPL CALCULATED.3IONS-SCNC: 11 MMOL/L (ref 3–16)
BUN BLDV-MCNC: 4 MG/DL (ref 7–20)
C-REACTIVE PROTEIN: 60.3 MG/L (ref 0–5.1)
CALCIUM SERPL-MCNC: 8 MG/DL (ref 8.3–10.6)
CHLORIDE BLD-SCNC: 106 MMOL/L (ref 99–110)
CO2: 22 MMOL/L (ref 21–32)
CREAT SERPL-MCNC: 0.6 MG/DL (ref 0.6–1.1)
GFR AFRICAN AMERICAN: >60
GFR NON-AFRICAN AMERICAN: >60
GLUCOSE BLD-MCNC: 87 MG/DL (ref 70–99)
HCT VFR BLD CALC: 32.1 % (ref 36–48)
HEMOGLOBIN: 11 G/DL (ref 12–16)
MCH RBC QN AUTO: 26.6 PG (ref 26–34)
MCHC RBC AUTO-ENTMCNC: 34.2 G/DL (ref 31–36)
MCV RBC AUTO: 77.8 FL (ref 80–100)
PDW BLD-RTO: 16 % (ref 12.4–15.4)
PLATELET # BLD: 380 K/UL (ref 135–450)
PMV BLD AUTO: 7.9 FL (ref 5–10.5)
POTASSIUM REFLEX MAGNESIUM: 4 MMOL/L (ref 3.5–5.1)
RBC # BLD: 4.13 M/UL (ref 4–5.2)
SODIUM BLD-SCNC: 139 MMOL/L (ref 136–145)
WBC # BLD: 13.7 K/UL (ref 4–11)

## 2019-09-12 PROCEDURE — 36415 COLL VENOUS BLD VENIPUNCTURE: CPT

## 2019-09-12 PROCEDURE — 6360000002 HC RX W HCPCS: Performed by: INTERNAL MEDICINE

## 2019-09-12 PROCEDURE — 2500000003 HC RX 250 WO HCPCS: Performed by: INTERNAL MEDICINE

## 2019-09-12 PROCEDURE — 85027 COMPLETE CBC AUTOMATED: CPT

## 2019-09-12 PROCEDURE — 80048 BASIC METABOLIC PNL TOTAL CA: CPT

## 2019-09-12 PROCEDURE — 1200000000 HC SEMI PRIVATE

## 2019-09-12 PROCEDURE — 99223 1ST HOSP IP/OBS HIGH 75: CPT | Performed by: SURGERY

## 2019-09-12 PROCEDURE — 86140 C-REACTIVE PROTEIN: CPT

## 2019-09-12 PROCEDURE — 2580000003 HC RX 258: Performed by: INTERNAL MEDICINE

## 2019-09-12 RX ORDER — METHYLPREDNISOLONE SODIUM SUCCINATE 40 MG/ML
40 INJECTION, POWDER, LYOPHILIZED, FOR SOLUTION INTRAMUSCULAR; INTRAVENOUS EVERY 12 HOURS
Status: DISCONTINUED | OUTPATIENT
Start: 2019-09-12 | End: 2019-09-18 | Stop reason: HOSPADM

## 2019-09-12 RX ADMIN — MORPHINE SULFATE 4 MG: 4 INJECTION INTRAVENOUS at 16:34

## 2019-09-12 RX ADMIN — ENOXAPARIN SODIUM 40 MG: 40 INJECTION SUBCUTANEOUS at 09:21

## 2019-09-12 RX ADMIN — Medication 10 ML: at 11:05

## 2019-09-12 RX ADMIN — SODIUM CHLORIDE, POTASSIUM CHLORIDE, SODIUM LACTATE AND CALCIUM CHLORIDE: 600; 310; 30; 20 INJECTION, SOLUTION INTRAVENOUS at 09:26

## 2019-09-12 RX ADMIN — ONDANSETRON 4 MG: 2 INJECTION INTRAMUSCULAR; INTRAVENOUS at 11:01

## 2019-09-12 RX ADMIN — METRONIDAZOLE 500 MG: 500 INJECTION, SOLUTION INTRAVENOUS at 22:11

## 2019-09-12 RX ADMIN — MORPHINE SULFATE 4 MG: 4 INJECTION INTRAVENOUS at 02:14

## 2019-09-12 RX ADMIN — SODIUM CHLORIDE, POTASSIUM CHLORIDE, SODIUM LACTATE AND CALCIUM CHLORIDE: 600; 310; 30; 20 INJECTION, SOLUTION INTRAVENOUS at 18:54

## 2019-09-12 RX ADMIN — CIPROFLOXACIN 400 MG: 2 INJECTION, SOLUTION INTRAVENOUS at 20:11

## 2019-09-12 RX ADMIN — METRONIDAZOLE 500 MG: 500 INJECTION, SOLUTION INTRAVENOUS at 05:09

## 2019-09-12 RX ADMIN — METRONIDAZOLE 500 MG: 500 INJECTION, SOLUTION INTRAVENOUS at 13:36

## 2019-09-12 RX ADMIN — MORPHINE SULFATE 4 MG: 4 INJECTION INTRAVENOUS at 22:35

## 2019-09-12 RX ADMIN — Medication 10 ML: at 20:11

## 2019-09-12 RX ADMIN — CIPROFLOXACIN 400 MG: 2 INJECTION, SOLUTION INTRAVENOUS at 09:21

## 2019-09-12 RX ADMIN — MORPHINE SULFATE 2 MG: 2 INJECTION, SOLUTION INTRAMUSCULAR; INTRAVENOUS at 11:01

## 2019-09-12 RX ADMIN — Medication 40 MG: at 22:11

## 2019-09-12 RX ADMIN — MORPHINE SULFATE 4 MG: 4 INJECTION INTRAVENOUS at 06:15

## 2019-09-12 RX ADMIN — ONDANSETRON 4 MG: 2 INJECTION INTRAMUSCULAR; INTRAVENOUS at 02:23

## 2019-09-12 RX ADMIN — Medication 40 MG: at 11:03

## 2019-09-12 ASSESSMENT — PAIN DESCRIPTION - LOCATION: LOCATION: ABDOMEN

## 2019-09-12 ASSESSMENT — PAIN DESCRIPTION - DESCRIPTORS: DESCRIPTORS: ACHING

## 2019-09-12 ASSESSMENT — PAIN SCALES - GENERAL
PAINLEVEL_OUTOF10: 8
PAINLEVEL_OUTOF10: 10
PAINLEVEL_OUTOF10: 5
PAINLEVEL_OUTOF10: 8
PAINLEVEL_OUTOF10: 6
PAINLEVEL_OUTOF10: 7
PAINLEVEL_OUTOF10: 5
PAINLEVEL_OUTOF10: 7
PAINLEVEL_OUTOF10: 7

## 2019-09-12 ASSESSMENT — PAIN DESCRIPTION - FREQUENCY: FREQUENCY: INTERMITTENT

## 2019-09-12 ASSESSMENT — PAIN DESCRIPTION - PAIN TYPE: TYPE: ACUTE PAIN

## 2019-09-12 NOTE — CONSULTS
No results found for: CHOL, HDL, TRIG  UA: Lab Results   Component Value Date    COLORU Yellow 09/11/2019    PHUR 6.0 09/11/2019    WBCUA 0-2 09/11/2019    RBCUA 0-2 09/11/2019    MUCUS 1+ 09/11/2019    BACTERIA Rare 09/11/2019    CLARITYU Clear 09/11/2019    SPECGRAV >=1.030 09/11/2019    LEUKOCYTESUR Negative 09/11/2019    UROBILINOGEN 0.2 09/11/2019    BILIRUBINUR Negative 09/11/2019    BLOODU LARGE 09/11/2019    GLUCOSEU Negative 09/11/2019       Imaging:   CT ABDOMEN PELVIS W IV CONTRAST Additional Contrast? None   Final Result      1. Findings consistent with Crohn's disease with a severely inflamed loop of distal ileum with multiple enteroenteric fistulas and extraluminal phlegmon or abscess in the right anterior pelvis measuring 1.5 cm in size. This results in a focal    inflammatory stricture causing moderate to severe distention of the bowel loop just proximal to this bowel loop measuring up to 4.5 cm in size. 2.  Additional skip short segmental areas of inflammatory bowel involve the ileum. 3. Mild free fluid in the pelvis. Assessment/Plan: This is a 35 y.o. female with Hx of Crohn's presents to the ED with increasing abdominal pain and nausea for one week. CT shows inflamed terminal ilium with dilatation of the small bowel proximal to the inflammation, skip segments. Stricture of terminal ilium vs acute inflammation.  Enteroenteric fistulas.    -agree with medicine admission  -agree with consult to GI  -will most likely need to begin a new course of steroids  -will most likely need to restart Humira  -start abx, cipro and flagyl  -will reassess terminal ilium once this acute flare has resolved, will look to see if stricture remains vs resolves with resolution of the inflammation    Laz Ramos DO PGY1  General Surgery Resident  09/12/19 12:15 AM  [phone]

## 2019-09-12 NOTE — H&P
HOSPITALISTS HISTORY AND PHYSICAL    9/11/2019 11:42 PM    Patient Information:  Birgit Radford is a 35 y.o. female [id number]  PCP:  No primary care provider on file. (Tel: None )    Chief complaint:    Chief Complaint   Patient presents with    Abdominal Pain     History of Present Illness:  Jose Martin Daily is a 35 y.o. female who presented with abdominal pain going on for a week, with night sweats, with associated nausea and vomiting, not able to keep anything down for the last couple of days    History of Crohn's disease not on Humira for last 1 and half months, diagnosed about 5 years ago as per the patient  She follows with  of [address]      History obtained from patient   Old medical records show admission for Crohn's disease in March, also history of latent TB status post INH for 6 months      REVIEW OF SYSTEMS:   All other ROS negative except mentioned in [address]    Past Medical History:   has a past medical history of Crohn's disease ([address] and Tuberculosis. Past Surgical History:   has a past surgical history that includes laparoscopy. Medications:  No current facility-administered medications on file prior to encounter. Current Outpatient Medications on File Prior to Encounter   Medication Sig Dispense Refill    promethazine (PHENERGAN) 25 MG tablet Take 25 mg by mouth every 6 hours as needed for Nausea      omeprazole (PRILOSEC) 20 MG delayed release capsule Take 20 mg by mouth daily         Allergies: Allergies   Allergen Reactions    Bentyl [Dicyclomine Hcl]         Social History:  Patient Lives with her son   reports that she has quit smoking. She has never used smokeless tobacco. She reports that she drank alcohol. She reports that she has current or past drug history. Drug: Marijuana. Frequency: 7.00 times per week.      Family History:  family history includes

## 2019-09-13 PROBLEM — E43 SEVERE MALNUTRITION (HCC): Chronic | Status: ACTIVE | Noted: 2019-09-13

## 2019-09-13 LAB
ALBUMIN SERPL-MCNC: 3.5 G/DL (ref 3.4–5)
ANION GAP SERPL CALCULATED.3IONS-SCNC: 14 MMOL/L (ref 3–16)
BASOPHILS ABSOLUTE: 0 K/UL (ref 0–0.2)
BASOPHILS RELATIVE PERCENT: 0.1 %
BUN BLDV-MCNC: 6 MG/DL (ref 7–20)
CALCIUM SERPL-MCNC: 8.7 MG/DL (ref 8.3–10.6)
CHLORIDE BLD-SCNC: 102 MMOL/L (ref 99–110)
CO2: 21 MMOL/L (ref 21–32)
CREAT SERPL-MCNC: <0.5 MG/DL (ref 0.6–1.1)
EOSINOPHILS ABSOLUTE: 0 K/UL (ref 0–0.6)
EOSINOPHILS RELATIVE PERCENT: 0 %
GFR AFRICAN AMERICAN: >60
GFR NON-AFRICAN AMERICAN: >60
GLUCOSE BLD-MCNC: 118 MG/DL (ref 70–99)
HCT VFR BLD CALC: 33.4 % (ref 36–48)
HEMOGLOBIN: 11.2 G/DL (ref 12–16)
LYMPHOCYTES ABSOLUTE: 0.4 K/UL (ref 1–5.1)
LYMPHOCYTES RELATIVE PERCENT: 2.9 %
MAGNESIUM: 2.1 MG/DL (ref 1.8–2.4)
MCH RBC QN AUTO: 26 PG (ref 26–34)
MCHC RBC AUTO-ENTMCNC: 33.6 G/DL (ref 31–36)
MCV RBC AUTO: 77.5 FL (ref 80–100)
MONOCYTES ABSOLUTE: 0.3 K/UL (ref 0–1.3)
MONOCYTES RELATIVE PERCENT: 2.3 %
NEUTROPHILS ABSOLUTE: 12.2 K/UL (ref 1.7–7.7)
NEUTROPHILS RELATIVE PERCENT: 94.7 %
PDW BLD-RTO: 15.8 % (ref 12.4–15.4)
PHOSPHORUS: 2.9 MG/DL (ref 2.5–4.9)
PLATELET # BLD: 396 K/UL (ref 135–450)
PMV BLD AUTO: 7.7 FL (ref 5–10.5)
POTASSIUM SERPL-SCNC: 4.3 MMOL/L (ref 3.5–5.1)
RBC # BLD: 4.31 M/UL (ref 4–5.2)
SODIUM BLD-SCNC: 137 MMOL/L (ref 136–145)
WBC # BLD: 12.9 K/UL (ref 4–11)

## 2019-09-13 PROCEDURE — 99232 SBSQ HOSP IP/OBS MODERATE 35: CPT | Performed by: SURGERY

## 2019-09-13 PROCEDURE — 36415 COLL VENOUS BLD VENIPUNCTURE: CPT

## 2019-09-13 PROCEDURE — 1200000000 HC SEMI PRIVATE

## 2019-09-13 PROCEDURE — 6360000002 HC RX W HCPCS: Performed by: INTERNAL MEDICINE

## 2019-09-13 PROCEDURE — 83735 ASSAY OF MAGNESIUM: CPT

## 2019-09-13 PROCEDURE — 85025 COMPLETE CBC W/AUTO DIFF WBC: CPT

## 2019-09-13 PROCEDURE — 2580000003 HC RX 258: Performed by: INTERNAL MEDICINE

## 2019-09-13 PROCEDURE — 2500000003 HC RX 250 WO HCPCS: Performed by: INTERNAL MEDICINE

## 2019-09-13 PROCEDURE — 80069 RENAL FUNCTION PANEL: CPT

## 2019-09-13 RX ADMIN — MORPHINE SULFATE 4 MG: 4 INJECTION INTRAVENOUS at 03:00

## 2019-09-13 RX ADMIN — CIPROFLOXACIN 400 MG: 2 INJECTION, SOLUTION INTRAVENOUS at 21:30

## 2019-09-13 RX ADMIN — Medication 40 MG: at 10:13

## 2019-09-13 RX ADMIN — ENOXAPARIN SODIUM 40 MG: 40 INJECTION SUBCUTANEOUS at 08:20

## 2019-09-13 RX ADMIN — METRONIDAZOLE 500 MG: 500 INJECTION, SOLUTION INTRAVENOUS at 04:30

## 2019-09-13 RX ADMIN — METRONIDAZOLE 500 MG: 500 INJECTION, SOLUTION INTRAVENOUS at 21:30

## 2019-09-13 RX ADMIN — METRONIDAZOLE 500 MG: 500 INJECTION, SOLUTION INTRAVENOUS at 13:13

## 2019-09-13 RX ADMIN — ONDANSETRON 4 MG: 2 INJECTION INTRAMUSCULAR; INTRAVENOUS at 03:02

## 2019-09-13 RX ADMIN — SODIUM CHLORIDE, POTASSIUM CHLORIDE, SODIUM LACTATE AND CALCIUM CHLORIDE: 600; 310; 30; 20 INJECTION, SOLUTION INTRAVENOUS at 21:26

## 2019-09-13 RX ADMIN — SODIUM CHLORIDE, POTASSIUM CHLORIDE, SODIUM LACTATE AND CALCIUM CHLORIDE: 600; 310; 30; 20 INJECTION, SOLUTION INTRAVENOUS at 10:13

## 2019-09-13 RX ADMIN — CIPROFLOXACIN 400 MG: 2 INJECTION, SOLUTION INTRAVENOUS at 08:19

## 2019-09-13 RX ADMIN — MORPHINE SULFATE 4 MG: 4 INJECTION INTRAVENOUS at 08:18

## 2019-09-13 RX ADMIN — MORPHINE SULFATE 4 MG: 4 INJECTION INTRAVENOUS at 14:33

## 2019-09-13 RX ADMIN — Medication 40 MG: at 22:48

## 2019-09-13 RX ADMIN — MORPHINE SULFATE 4 MG: 4 INJECTION INTRAVENOUS at 22:49

## 2019-09-13 ASSESSMENT — PAIN DESCRIPTION - PROGRESSION
CLINICAL_PROGRESSION: NOT CHANGED
CLINICAL_PROGRESSION: NOT CHANGED

## 2019-09-13 ASSESSMENT — PAIN DESCRIPTION - ONSET
ONSET: ON-GOING
ONSET: ON-GOING

## 2019-09-13 ASSESSMENT — PAIN DESCRIPTION - LOCATION
LOCATION: ABDOMEN
LOCATION: ABDOMEN

## 2019-09-13 ASSESSMENT — PAIN SCALES - GENERAL
PAINLEVEL_OUTOF10: 4
PAINLEVEL_OUTOF10: 7
PAINLEVEL_OUTOF10: 0
PAINLEVEL_OUTOF10: 7
PAINLEVEL_OUTOF10: 7
PAINLEVEL_OUTOF10: 8
PAINLEVEL_OUTOF10: 4

## 2019-09-13 ASSESSMENT — PAIN DESCRIPTION - PAIN TYPE
TYPE: ACUTE PAIN
TYPE: ACUTE PAIN

## 2019-09-13 ASSESSMENT — PAIN DESCRIPTION - DESCRIPTORS
DESCRIPTORS: ACHING;CRAMPING
DESCRIPTORS: ACHING;CRAMPING

## 2019-09-13 ASSESSMENT — PAIN DESCRIPTION - ORIENTATION
ORIENTATION: MID;RIGHT
ORIENTATION: MID;RIGHT

## 2019-09-13 ASSESSMENT — PAIN DESCRIPTION - FREQUENCY
FREQUENCY: CONTINUOUS
FREQUENCY: CONTINUOUS

## 2019-09-13 ASSESSMENT — PAIN - FUNCTIONAL ASSESSMENT
PAIN_FUNCTIONAL_ASSESSMENT: PREVENTS OR INTERFERES SOME ACTIVE ACTIVITIES AND ADLS
PAIN_FUNCTIONAL_ASSESSMENT: PREVENTS OR INTERFERES SOME ACTIVE ACTIVITIES AND ADLS

## 2019-09-13 NOTE — PROGRESS NOTES
Hospitalist Progress Note      PCP: No primary care provider on file. Date of Admission: 9/11/2019    Chief Complaint: Abd pain    Hospital Course: Admitted for crohn's colitis. On IV steroids, abx and IVF    Subjective: Pt feeling a little better. Abd pain and nausea improving      Medications:  Reviewed    Infusion Medications    lactated ringers 100 mL/hr at 09/12/19 1854     Scheduled Medications    methylPREDNISolone  40 mg Intravenous Q12H    sodium chloride flush  10 mL Intravenous 2 times per day    enoxaparin  40 mg Subcutaneous Daily    ciprofloxacin  400 mg Intravenous Q12H    metroNIDAZOLE  500 mg Intravenous Q8H     PRN Meds: sodium chloride flush, magnesium hydroxide, ondansetron, morphine **OR** morphine      Intake/Output Summary (Last 24 hours) at 9/13/2019 0931  Last data filed at 9/12/2019 1522  Gross per 24 hour   Intake 20 ml   Output --   Net 20 ml       Physical Exam Performed:    BP 99/62   Pulse 60   Temp 96.4 °F (35.8 °C) (Oral)   Resp 16   Ht 5' 5\" (1.651 m)   Wt 113 lb (51.3 kg)   LMP 09/06/2019 (Exact Date)   SpO2 100%   BMI 18.80 kg/m²     General appearance: No apparent distress, appears stated age and cooperative. HEENT: Pupils equal, round, and reactive to light. Conjunctivae/corneas clear. Neck: Supple, with full range of motion. No jugular venous distention. Trachea midline. Respiratory:  Normal respiratory effort. Clear to auscultation, bilaterally without Rales/Wheezes/Rhonchi. Cardiovascular: Regular rate and rhythm with normal S1/S2 without murmurs, rubs or gallops. Abdomen: Soft, lower abd tenderness improving, non-distended with normal bowel sounds. Musculoskeletal: No clubbing, cyanosis or edema bilaterally. Full range of motion without deformity. Skin: Skin color, texture, turgor normal.  No rashes or lesions. Neurologic:  Neurovascularly intact without any focal sensory/motor deficits.  Cranial nerves: II-XII intact, grossly

## 2019-09-13 NOTE — PROGRESS NOTES
· Estimated Daily Total Fluid (ml/day): 1 kcal/ml    Nutrition Diagnosis:   · Problem: Inadequate oral intake, Severe malnutrition  · Etiology: related to Insufficient energy/nutrient consumption, Increased demand for energy/nutrients     Signs and symptoms:  as evidenced by NPO status due to medical condition, Diet history of poor intake, Weight loss    Objective Information:  · Nutrition-Focused Physical Findings: no BM yet  · Wound Type: None  · Current Nutrition Therapies:  · Oral Diet Orders: NPO(sips of clears, no carbonation)   · Oral Diet intake: NPO  · Oral Nutrition Supplement (ONS) Orders: None  · ONS intake: NPO  · Anthropometric Measures:  · Ht: 5' 5\" (165.1 cm)   · Current Body Wt: 113 lb (51.3 kg)  · % Weight Change:  ,  16.3% weight loss in the past year  · Ideal Body Wt: 125 lb (56.7 kg), % Ideal Body    · BMI Classification: (unable to assess accurately without actual weight)    Nutrition Interventions:   Continue NPO  Continued Inpatient Monitoring    Nutrition Evaluation:   · Evaluation: Goals set   · Goals: Patient will eat 50% or greater of meals and supplements without GI distress.       · Monitoring: Supplement Intake, Pertinent Labs, Meal Intake, Nutrition Progression, Monitor Bowel Function      Electronically signed by Florina Vasquez RD, LD on 9/13/19 at 10:57 AM    Contact Number: Office: [phone]; [address]: [id number]

## 2019-09-14 LAB
ALBUMIN SERPL-MCNC: 3.6 G/DL (ref 3.4–5)
ANION GAP SERPL CALCULATED.3IONS-SCNC: 9 MMOL/L (ref 3–16)
BASOPHILS ABSOLUTE: 0 K/UL (ref 0–0.2)
BASOPHILS RELATIVE PERCENT: 0.1 %
BUN BLDV-MCNC: 6 MG/DL (ref 7–20)
CALCIUM SERPL-MCNC: 8.6 MG/DL (ref 8.3–10.6)
CHLORIDE BLD-SCNC: 101 MMOL/L (ref 99–110)
CO2: 26 MMOL/L (ref 21–32)
CREAT SERPL-MCNC: <0.5 MG/DL (ref 0.6–1.1)
EOSINOPHILS ABSOLUTE: 0 K/UL (ref 0–0.6)
EOSINOPHILS RELATIVE PERCENT: 0 %
GFR AFRICAN AMERICAN: >60
GFR NON-AFRICAN AMERICAN: >60
GLUCOSE BLD-MCNC: 151 MG/DL (ref 70–99)
HCT VFR BLD CALC: 32.5 % (ref 36–48)
HEMOGLOBIN: 11.1 G/DL (ref 12–16)
LYMPHOCYTES ABSOLUTE: 0.4 K/UL (ref 1–5.1)
LYMPHOCYTES RELATIVE PERCENT: 2.9 %
MAGNESIUM: 2.1 MG/DL (ref 1.8–2.4)
MCH RBC QN AUTO: 26.2 PG (ref 26–34)
MCHC RBC AUTO-ENTMCNC: 34 G/DL (ref 31–36)
MCV RBC AUTO: 77 FL (ref 80–100)
MONOCYTES ABSOLUTE: 0.4 K/UL (ref 0–1.3)
MONOCYTES RELATIVE PERCENT: 3.3 %
NEUTROPHILS ABSOLUTE: 12.1 K/UL (ref 1.7–7.7)
NEUTROPHILS RELATIVE PERCENT: 93.7 %
PDW BLD-RTO: 15.7 % (ref 12.4–15.4)
PHOSPHORUS: 2.8 MG/DL (ref 2.5–4.9)
PLATELET # BLD: 442 K/UL (ref 135–450)
PMV BLD AUTO: 8.4 FL (ref 5–10.5)
POTASSIUM SERPL-SCNC: 4.3 MMOL/L (ref 3.5–5.1)
RBC # BLD: 4.22 M/UL (ref 4–5.2)
SODIUM BLD-SCNC: 136 MMOL/L (ref 136–145)
WBC # BLD: 12.9 K/UL (ref 4–11)

## 2019-09-14 PROCEDURE — 99232 SBSQ HOSP IP/OBS MODERATE 35: CPT | Performed by: SURGERY

## 2019-09-14 PROCEDURE — 80069 RENAL FUNCTION PANEL: CPT

## 2019-09-14 PROCEDURE — 36415 COLL VENOUS BLD VENIPUNCTURE: CPT

## 2019-09-14 PROCEDURE — 83735 ASSAY OF MAGNESIUM: CPT

## 2019-09-14 PROCEDURE — 2500000003 HC RX 250 WO HCPCS: Performed by: INTERNAL MEDICINE

## 2019-09-14 PROCEDURE — 6360000002 HC RX W HCPCS: Performed by: INTERNAL MEDICINE

## 2019-09-14 PROCEDURE — 85025 COMPLETE CBC W/AUTO DIFF WBC: CPT

## 2019-09-14 PROCEDURE — 1200000000 HC SEMI PRIVATE

## 2019-09-14 PROCEDURE — 2580000003 HC RX 258: Performed by: INTERNAL MEDICINE

## 2019-09-14 RX ORDER — PROCHLORPERAZINE EDISYLATE 5 MG/ML
10 INJECTION INTRAMUSCULAR; INTRAVENOUS EVERY 6 HOURS PRN
Status: DISCONTINUED | OUTPATIENT
Start: 2019-09-14 | End: 2019-09-18 | Stop reason: HOSPADM

## 2019-09-14 RX ADMIN — ONDANSETRON 4 MG: 2 INJECTION INTRAMUSCULAR; INTRAVENOUS at 21:35

## 2019-09-14 RX ADMIN — CIPROFLOXACIN 400 MG: 2 INJECTION, SOLUTION INTRAVENOUS at 09:28

## 2019-09-14 RX ADMIN — Medication 40 MG: at 10:38

## 2019-09-14 RX ADMIN — Medication 40 MG: at 22:37

## 2019-09-14 RX ADMIN — ENOXAPARIN SODIUM 40 MG: 40 INJECTION SUBCUTANEOUS at 09:29

## 2019-09-14 RX ADMIN — ONDANSETRON 4 MG: 2 INJECTION INTRAMUSCULAR; INTRAVENOUS at 10:39

## 2019-09-14 RX ADMIN — METRONIDAZOLE 500 MG: 500 INJECTION, SOLUTION INTRAVENOUS at 05:45

## 2019-09-14 RX ADMIN — MORPHINE SULFATE 4 MG: 4 INJECTION INTRAVENOUS at 09:28

## 2019-09-14 RX ADMIN — CIPROFLOXACIN 400 MG: 2 INJECTION, SOLUTION INTRAVENOUS at 20:19

## 2019-09-14 RX ADMIN — Medication 10 ML: at 10:35

## 2019-09-14 RX ADMIN — METRONIDAZOLE 500 MG: 500 INJECTION, SOLUTION INTRAVENOUS at 20:23

## 2019-09-14 RX ADMIN — METRONIDAZOLE 500 MG: 500 INJECTION, SOLUTION INTRAVENOUS at 15:18

## 2019-09-14 RX ADMIN — MORPHINE SULFATE 4 MG: 4 INJECTION INTRAVENOUS at 15:22

## 2019-09-14 RX ADMIN — Medication 10 ML: at 20:19

## 2019-09-14 RX ADMIN — PROCHLORPERAZINE EDISYLATE 10 MG: 5 INJECTION INTRAMUSCULAR; INTRAVENOUS at 23:01

## 2019-09-14 ASSESSMENT — PAIN DESCRIPTION - FREQUENCY: FREQUENCY: INTERMITTENT

## 2019-09-14 ASSESSMENT — PAIN DESCRIPTION - ONSET: ONSET: ON-GOING

## 2019-09-14 ASSESSMENT — PAIN SCALES - GENERAL
PAINLEVEL_OUTOF10: 0
PAINLEVEL_OUTOF10: 5
PAINLEVEL_OUTOF10: 4
PAINLEVEL_OUTOF10: 4
PAINLEVEL_OUTOF10: 0
PAINLEVEL_OUTOF10: 7
PAINLEVEL_OUTOF10: 7

## 2019-09-14 ASSESSMENT — PAIN DESCRIPTION - ORIENTATION: ORIENTATION: RIGHT;MID

## 2019-09-14 ASSESSMENT — PAIN DESCRIPTION - LOCATION: LOCATION: ABDOMEN

## 2019-09-14 ASSESSMENT — PAIN DESCRIPTION - PAIN TYPE: TYPE: ACUTE PAIN

## 2019-09-14 ASSESSMENT — PAIN DESCRIPTION - DESCRIPTORS: DESCRIPTORS: ACHING;CRAMPING

## 2019-09-14 NOTE — PROGRESS NOTES
Surgery Daily Progress Note      CC: abdominal pain and nausea    Subjective :  Still no BM, but pt endorses passing flatus. Pt tolerating CLD without nausea or emesis overnight. Pt reports pain is improving and well controlled, 6/10 this am, last prn morphine 11pm last night. Pt reports urinating adequately without pain or inc frequency. Pt was not aware IS at bedside, encouraged frequent use, she acknowledged knowing how to use. Denies fever, chills, CP/SOB, cough. Objective     Exam:  Vitals:    09/13/19 1605 09/13/19 1930 09/13/19 2355 09/14/19 0344   BP: 111/71 110/70 112/69 134/79   Pulse: 65 70 72 97   Resp: 16 14 16 18   Temp: 97.3 °F (36.3 °C) 97.7 °F (36.5 °C) 98.2 °F (36.8 °C) 98.6 °F (37 °C)   TempSrc: Oral Oral Oral Oral   SpO2: 100% 98% 97% 96%   Weight:       Height:       Wt 113 lb (51.3 kg)   LMP 09/06/2019 (Exact Date)   SpO2 97%   BMI 18.80 kg/m²       General appearance: alert and cooperative  Lungs: clear to auscultation bilaterally  Heart: regular rate and rhythm, S1, S2    Abdomen: soft, minimally tender to deep palpation in lower quadrant - improved; bowel sounds present      ASSESSMENT/PLAN:   This is a 35 y.o. female with acute flare of chronic Crohn's colitis with strictures, multiple enteroenteric fistulas, abscess and history of medical noncompliance    - WBC 12.9 from 12.9     - continue to monitor WBC  - continue Cipro and Flagyl (Day 4)  - GI to manage Crohn's with steroids - cont solumedrol 40 bid  - Continue serial abdominal exams  - advanced to Clear Liquid Diet  - IS at bedside, encouraged frequent use  - FEN: with abx, Diet: full liquid this AM  - VTE PPx: Lovenox   - Activity: Encourage frequent ambulation and out of bed to chair  - TNF alpha inhibitor at discharge      Sheridan Magallanes, MSY3  09/14/19  7:21 AM     I have reviewed and discussed with student about the patient and agree with his/her physical exam findings and assessment/plan.  Appropriate changes made above.    Delores Chaudhry,  PGY-1  7:25 AM  9/14/2019  [phone]

## 2019-09-14 NOTE — PROGRESS NOTES
[facility]  GI Progress Note        Radha Lua is a 35 y.o. female patient. 1. Crohn's disease with abscess, unspecified gastrointestinal tract location [facility])        Admit Date: 2019    Subjective:     Pain and night sweats improved  The patient feels better today  No BM    Tolerating some liquids    Scheduled Meds:   methylPREDNISolone  40 mg Intravenous Q12H    sodium chloride flush  10 mL Intravenous 2 times per day    enoxaparin  40 mg Subcutaneous Daily    ciprofloxacin  400 mg Intravenous Q12H    metroNIDAZOLE  500 mg Intravenous Q8H       Continuous Infusions:   lactated ringers 100 mL/hr at 19 2126       PRN Meds:  sodium chloride flush, magnesium hydroxide, ondansetron, morphine **OR** morphine      Objective:       Patient Vitals for the past 24 hrs:   BP Temp Temp src Pulse Resp SpO2   19 0751 (!) 144/85 97.2 °F (36.2 °C) Oral 101 17 94 %   19 0344 134/79 98.6 °F (37 °C) Oral 97 18 96 %   19 2355 112/69 98.2 °F (36.8 °C) Oral 72 16 97 %   19 1930 110/70 97.7 °F (36.5 °C) Oral 70 14 98 %   19 1605 111/71 97.3 °F (36.3 °C) Oral 65 16 100 %   19 1253 (!) 101/53 97.5 °F (36.4 °C) Oral 70 16 99 %       Exam:  VITALS:  BP (!) 144/85   Pulse 101   Temp 97.2 °F (36.2 °C) (Oral)   Resp 17   Ht 5' 5\" (1.651 m)   Wt 113 lb (51.3 kg)   LMP 2019 (Exact Date)   SpO2 94%   BMI 18.80 kg/m²   TEMPERATURE:  Current - Temp: 97.2 °F (36.2 °C);  Max - Temp  Av.8 °F (36.6 °C)  Min: 97.2 °F (36.2 °C)  Max: 98.6 °F (37 °C)    NAD  General appearance: alert, appears stated age, cooperative and no distress  Head: Normocephalic, without obvious abnormality, atraumatic  Neck: supple, symmetrical, trachea midline and thyroid not enlarged, symmetric, no tenderness/mass/nodules  CVS:  RRR, Nl s1s2  Lungs CTA Bilaterally, normal effort  Abdomen:diffuse ttp, soft, no bowel sounds auscultated  AAOx3, No asterixis or

## 2019-09-14 NOTE — PLAN OF CARE
Problem: Nausea/Vomiting:  Goal: Absence of nausea/vomiting  Description  Absence of nausea/vomiting  Outcome: Ongoing  Note:   Pt is able to drink small amounts. Reminded pt to go slow. Pt also given nausea medication. Will continue to monitor.

## 2019-09-15 LAB
ALBUMIN SERPL-MCNC: 3.3 G/DL (ref 3.4–5)
ANION GAP SERPL CALCULATED.3IONS-SCNC: 11 MMOL/L (ref 3–16)
BASOPHILS ABSOLUTE: 0 K/UL (ref 0–0.2)
BASOPHILS RELATIVE PERCENT: 0 %
BUN BLDV-MCNC: 5 MG/DL (ref 7–20)
C-REACTIVE PROTEIN: 5.9 MG/L (ref 0–5.1)
CALCIUM SERPL-MCNC: 8.3 MG/DL (ref 8.3–10.6)
CHLORIDE BLD-SCNC: 105 MMOL/L (ref 99–110)
CO2: 25 MMOL/L (ref 21–32)
CREAT SERPL-MCNC: <0.5 MG/DL (ref 0.6–1.1)
EOSINOPHILS ABSOLUTE: 0 K/UL (ref 0–0.6)
EOSINOPHILS RELATIVE PERCENT: 0 %
GFR AFRICAN AMERICAN: >60
GFR NON-AFRICAN AMERICAN: >60
GLUCOSE BLD-MCNC: 151 MG/DL (ref 70–99)
HCT VFR BLD CALC: 33.7 % (ref 36–48)
HEMOGLOBIN: 11.6 G/DL (ref 12–16)
LYMPHOCYTES ABSOLUTE: 0.4 K/UL (ref 1–5.1)
LYMPHOCYTES RELATIVE PERCENT: 4.4 %
MAGNESIUM: 2.2 MG/DL (ref 1.8–2.4)
MCH RBC QN AUTO: 26.6 PG (ref 26–34)
MCHC RBC AUTO-ENTMCNC: 34.3 G/DL (ref 31–36)
MCV RBC AUTO: 77.6 FL (ref 80–100)
MONOCYTES ABSOLUTE: 0.5 K/UL (ref 0–1.3)
MONOCYTES RELATIVE PERCENT: 5.4 %
NEUTROPHILS ABSOLUTE: 8.5 K/UL (ref 1.7–7.7)
NEUTROPHILS RELATIVE PERCENT: 90.2 %
PDW BLD-RTO: 15.7 % (ref 12.4–15.4)
PHOSPHORUS: 2.7 MG/DL (ref 2.5–4.9)
PLATELET # BLD: 416 K/UL (ref 135–450)
PMV BLD AUTO: 8.2 FL (ref 5–10.5)
POTASSIUM SERPL-SCNC: 4.3 MMOL/L (ref 3.5–5.1)
RBC # BLD: 4.35 M/UL (ref 4–5.2)
SODIUM BLD-SCNC: 141 MMOL/L (ref 136–145)
WBC # BLD: 9.4 K/UL (ref 4–11)

## 2019-09-15 PROCEDURE — 2580000003 HC RX 258: Performed by: INTERNAL MEDICINE

## 2019-09-15 PROCEDURE — 86140 C-REACTIVE PROTEIN: CPT

## 2019-09-15 PROCEDURE — 6360000002 HC RX W HCPCS: Performed by: INTERNAL MEDICINE

## 2019-09-15 PROCEDURE — 80069 RENAL FUNCTION PANEL: CPT

## 2019-09-15 PROCEDURE — 83735 ASSAY OF MAGNESIUM: CPT

## 2019-09-15 PROCEDURE — 1200000000 HC SEMI PRIVATE

## 2019-09-15 PROCEDURE — 85025 COMPLETE CBC W/AUTO DIFF WBC: CPT

## 2019-09-15 PROCEDURE — 36415 COLL VENOUS BLD VENIPUNCTURE: CPT

## 2019-09-15 PROCEDURE — 2500000003 HC RX 250 WO HCPCS: Performed by: INTERNAL MEDICINE

## 2019-09-15 PROCEDURE — 94150 VITAL CAPACITY TEST: CPT

## 2019-09-15 PROCEDURE — 6370000000 HC RX 637 (ALT 250 FOR IP): Performed by: INTERNAL MEDICINE

## 2019-09-15 RX ORDER — CALCIUM CARBONATE 200(500)MG
500 TABLET,CHEWABLE ORAL 3 TIMES DAILY PRN
Status: DISCONTINUED | OUTPATIENT
Start: 2019-09-15 | End: 2019-09-18 | Stop reason: HOSPADM

## 2019-09-15 RX ADMIN — Medication 10 ML: at 20:28

## 2019-09-15 RX ADMIN — Medication 40 MG: at 22:27

## 2019-09-15 RX ADMIN — ONDANSETRON 4 MG: 2 INJECTION INTRAMUSCULAR; INTRAVENOUS at 21:57

## 2019-09-15 RX ADMIN — CIPROFLOXACIN 400 MG: 2 INJECTION, SOLUTION INTRAVENOUS at 08:09

## 2019-09-15 RX ADMIN — MORPHINE SULFATE 4 MG: 4 INJECTION INTRAVENOUS at 11:37

## 2019-09-15 RX ADMIN — PROCHLORPERAZINE EDISYLATE 10 MG: 5 INJECTION INTRAMUSCULAR; INTRAVENOUS at 16:20

## 2019-09-15 RX ADMIN — METRONIDAZOLE 500 MG: 500 INJECTION, SOLUTION INTRAVENOUS at 16:01

## 2019-09-15 RX ADMIN — ONDANSETRON 4 MG: 2 INJECTION INTRAMUSCULAR; INTRAVENOUS at 11:37

## 2019-09-15 RX ADMIN — CIPROFLOXACIN 400 MG: 2 INJECTION, SOLUTION INTRAVENOUS at 20:23

## 2019-09-15 RX ADMIN — ANTACID TABLETS 500 MG: 500 TABLET, CHEWABLE ORAL at 21:11

## 2019-09-15 RX ADMIN — MORPHINE SULFATE 2 MG: 2 INJECTION, SOLUTION INTRAMUSCULAR; INTRAVENOUS at 23:57

## 2019-09-15 RX ADMIN — Medication 40 MG: at 11:37

## 2019-09-15 RX ADMIN — MORPHINE SULFATE 4 MG: 4 INJECTION INTRAVENOUS at 16:20

## 2019-09-15 RX ADMIN — METRONIDAZOLE 500 MG: 500 INJECTION, SOLUTION INTRAVENOUS at 22:27

## 2019-09-15 ASSESSMENT — PAIN DESCRIPTION - LOCATION
LOCATION: ABDOMEN
LOCATION: ABDOMEN

## 2019-09-15 ASSESSMENT — PAIN SCALES - GENERAL
PAINLEVEL_OUTOF10: 6
PAINLEVEL_OUTOF10: 6
PAINLEVEL_OUTOF10: 7
PAINLEVEL_OUTOF10: 7
PAINLEVEL_OUTOF10: 6
PAINLEVEL_OUTOF10: 0
PAINLEVEL_OUTOF10: 6

## 2019-09-15 ASSESSMENT — PAIN DESCRIPTION - PAIN TYPE
TYPE: CHRONIC PAIN;ACUTE PAIN
TYPE: CHRONIC PAIN;ACUTE PAIN
TYPE: CHRONIC PAIN

## 2019-09-15 ASSESSMENT — PAIN DESCRIPTION - ONSET
ONSET: ON-GOING
ONSET: GRADUAL

## 2019-09-15 ASSESSMENT — PAIN DESCRIPTION - PROGRESSION
CLINICAL_PROGRESSION: GRADUALLY WORSENING
CLINICAL_PROGRESSION: NOT CHANGED

## 2019-09-15 ASSESSMENT — PAIN DESCRIPTION - ORIENTATION
ORIENTATION: MID
ORIENTATION: MID

## 2019-09-15 ASSESSMENT — PAIN DESCRIPTION - DESCRIPTORS
DESCRIPTORS: ACHING
DESCRIPTORS: ACHING;CRAMPING

## 2019-09-15 ASSESSMENT — PAIN - FUNCTIONAL ASSESSMENT
PAIN_FUNCTIONAL_ASSESSMENT: ACTIVITIES ARE NOT PREVENTED
PAIN_FUNCTIONAL_ASSESSMENT: PREVENTS OR INTERFERES SOME ACTIVE ACTIVITIES AND ADLS

## 2019-09-15 ASSESSMENT — PAIN DESCRIPTION - FREQUENCY
FREQUENCY: CONTINUOUS
FREQUENCY: INTERMITTENT

## 2019-09-16 LAB
ALBUMIN SERPL-MCNC: 3.7 G/DL (ref 3.4–5)
ANION GAP SERPL CALCULATED.3IONS-SCNC: 12 MMOL/L (ref 3–16)
BASOPHILS ABSOLUTE: 0 K/UL (ref 0–0.2)
BASOPHILS RELATIVE PERCENT: 0 %
BUN BLDV-MCNC: 5 MG/DL (ref 7–20)
CALCIUM SERPL-MCNC: 8.6 MG/DL (ref 8.3–10.6)
CHLORIDE BLD-SCNC: 101 MMOL/L (ref 99–110)
CO2: 25 MMOL/L (ref 21–32)
CREAT SERPL-MCNC: <0.5 MG/DL (ref 0.6–1.1)
EOSINOPHILS ABSOLUTE: 0 K/UL (ref 0–0.6)
EOSINOPHILS RELATIVE PERCENT: 0 %
GFR AFRICAN AMERICAN: >60
GFR NON-AFRICAN AMERICAN: >60
GLUCOSE BLD-MCNC: 143 MG/DL (ref 70–99)
HCT VFR BLD CALC: 34.5 % (ref 36–48)
HEMOGLOBIN: 11.6 G/DL (ref 12–16)
LYMPHOCYTES ABSOLUTE: 0.4 K/UL (ref 1–5.1)
LYMPHOCYTES RELATIVE PERCENT: 3.8 %
MAGNESIUM: 2.2 MG/DL (ref 1.8–2.4)
MCH RBC QN AUTO: 26.1 PG (ref 26–34)
MCHC RBC AUTO-ENTMCNC: 33.7 G/DL (ref 31–36)
MCV RBC AUTO: 77.6 FL (ref 80–100)
MONOCYTES ABSOLUTE: 0.5 K/UL (ref 0–1.3)
MONOCYTES RELATIVE PERCENT: 5.5 %
NEUTROPHILS ABSOLUTE: 8.4 K/UL (ref 1.7–7.7)
NEUTROPHILS RELATIVE PERCENT: 90.7 %
PDW BLD-RTO: 15.9 % (ref 12.4–15.4)
PHOSPHORUS: 3 MG/DL (ref 2.5–4.9)
PLATELET # BLD: 418 K/UL (ref 135–450)
PMV BLD AUTO: 8.3 FL (ref 5–10.5)
POTASSIUM SERPL-SCNC: 4.1 MMOL/L (ref 3.5–5.1)
PREALBUMIN: 23.9 MG/DL (ref 20–40)
RBC # BLD: 4.45 M/UL (ref 4–5.2)
SODIUM BLD-SCNC: 138 MMOL/L (ref 136–145)
WBC # BLD: 9.3 K/UL (ref 4–11)

## 2019-09-16 PROCEDURE — 99232 SBSQ HOSP IP/OBS MODERATE 35: CPT | Performed by: SURGERY

## 2019-09-16 PROCEDURE — 1200000000 HC SEMI PRIVATE

## 2019-09-16 PROCEDURE — 80069 RENAL FUNCTION PANEL: CPT

## 2019-09-16 PROCEDURE — 6370000000 HC RX 637 (ALT 250 FOR IP): Performed by: INTERNAL MEDICINE

## 2019-09-16 PROCEDURE — 83735 ASSAY OF MAGNESIUM: CPT

## 2019-09-16 PROCEDURE — 84134 ASSAY OF PREALBUMIN: CPT

## 2019-09-16 PROCEDURE — 6360000002 HC RX W HCPCS: Performed by: INTERNAL MEDICINE

## 2019-09-16 PROCEDURE — 85025 COMPLETE CBC W/AUTO DIFF WBC: CPT

## 2019-09-16 PROCEDURE — 2580000003 HC RX 258: Performed by: INTERNAL MEDICINE

## 2019-09-16 PROCEDURE — 36415 COLL VENOUS BLD VENIPUNCTURE: CPT

## 2019-09-16 PROCEDURE — 2500000003 HC RX 250 WO HCPCS: Performed by: INTERNAL MEDICINE

## 2019-09-16 RX ADMIN — METRONIDAZOLE 500 MG: 500 INJECTION, SOLUTION INTRAVENOUS at 06:07

## 2019-09-16 RX ADMIN — Medication 40 MG: at 09:38

## 2019-09-16 RX ADMIN — MORPHINE SULFATE 4 MG: 4 INJECTION INTRAVENOUS at 19:36

## 2019-09-16 RX ADMIN — Medication 10 ML: at 09:37

## 2019-09-16 RX ADMIN — MORPHINE SULFATE 2 MG: 2 INJECTION, SOLUTION INTRAMUSCULAR; INTRAVENOUS at 09:36

## 2019-09-16 RX ADMIN — ENOXAPARIN SODIUM 40 MG: 40 INJECTION SUBCUTANEOUS at 09:38

## 2019-09-16 RX ADMIN — ANTACID TABLETS 500 MG: 500 TABLET, CHEWABLE ORAL at 18:53

## 2019-09-16 RX ADMIN — ONDANSETRON 4 MG: 2 INJECTION INTRAMUSCULAR; INTRAVENOUS at 09:36

## 2019-09-16 RX ADMIN — METRONIDAZOLE 500 MG: 500 INJECTION, SOLUTION INTRAVENOUS at 13:05

## 2019-09-16 RX ADMIN — Medication 40 MG: at 21:05

## 2019-09-16 RX ADMIN — METRONIDAZOLE 500 MG: 500 INJECTION, SOLUTION INTRAVENOUS at 21:05

## 2019-09-16 RX ADMIN — CIPROFLOXACIN 400 MG: 2 INJECTION, SOLUTION INTRAVENOUS at 09:37

## 2019-09-16 RX ADMIN — CIPROFLOXACIN 400 MG: 2 INJECTION, SOLUTION INTRAVENOUS at 22:39

## 2019-09-16 RX ADMIN — Medication 10 ML: at 19:36

## 2019-09-16 RX ADMIN — ONDANSETRON 4 MG: 2 INJECTION INTRAMUSCULAR; INTRAVENOUS at 16:04

## 2019-09-16 ASSESSMENT — PAIN DESCRIPTION - LOCATION
LOCATION: ABDOMEN
LOCATION: ABDOMEN

## 2019-09-16 ASSESSMENT — PAIN DESCRIPTION - PAIN TYPE
TYPE: CHRONIC PAIN
TYPE: ACUTE PAIN

## 2019-09-16 ASSESSMENT — PAIN DESCRIPTION - ONSET
ONSET: ON-GOING
ONSET: GRADUAL

## 2019-09-16 ASSESSMENT — PAIN DESCRIPTION - ORIENTATION
ORIENTATION: MID;LOWER
ORIENTATION: MID;LOWER

## 2019-09-16 ASSESSMENT — PAIN SCALES - GENERAL
PAINLEVEL_OUTOF10: 0
PAINLEVEL_OUTOF10: 7
PAINLEVEL_OUTOF10: 7
PAINLEVEL_OUTOF10: 2

## 2019-09-16 ASSESSMENT — PAIN DESCRIPTION - PROGRESSION
CLINICAL_PROGRESSION: GRADUALLY WORSENING
CLINICAL_PROGRESSION: GRADUALLY WORSENING

## 2019-09-16 ASSESSMENT — PAIN - FUNCTIONAL ASSESSMENT
PAIN_FUNCTIONAL_ASSESSMENT: ACTIVITIES ARE NOT PREVENTED
PAIN_FUNCTIONAL_ASSESSMENT: ACTIVITIES ARE NOT PREVENTED

## 2019-09-16 ASSESSMENT — PAIN DESCRIPTION - DESCRIPTORS
DESCRIPTORS: CRAMPING
DESCRIPTORS: CRAMPING;SHARP

## 2019-09-16 ASSESSMENT — PAIN DESCRIPTION - FREQUENCY
FREQUENCY: INTERMITTENT
FREQUENCY: INTERMITTENT

## 2019-09-16 NOTE — PROGRESS NOTES
II-XII intact, grossly non-focal.  Psychiatric: Alert and oriented, thought content appropriate, normal insight  Capillary Refill: Brisk,< 3 seconds   Peripheral Pulses: +2 palpable, equal bilaterally       Labs:   Recent Labs     09/14/19 0540 09/15/19  0533 09/16/19  0544   WBC 12.9* 9.4 9.3   HGB 11.1* 11.6* 11.6*   HCT 32.5* 33.7* 34.5*    416 418     Recent Labs     09/14/19 0540 09/15/19  0533 09/16/19  0544    141 138   K 4.3 4.3 4.1    105 101   CO2 26 25 25   BUN 6* 5* 5*   CREATININE <0.5* <0.5* <0.5*   CALCIUM 8.6 8.3 8.6   PHOS 2.8 2.7 3.0     No results for input(s): AST, ALT, BILIDIR, BILITOT, ALKPHOS in the last 72 hours. No results for input(s): INR in the last 72 hours. No results for input(s): Celesta Felty in the last 72 hours. Urinalysis:      Lab Results   Component Value Date    NITRU Negative 09/11/2019    WBCUA 0-2 09/11/2019    BACTERIA Rare 09/11/2019    RBCUA 0-2 09/11/2019    BLOODU LARGE 09/11/2019    SPECGRAV >=1.030 09/11/2019    GLUCOSEU Negative 09/11/2019       Radiology:  CT ABDOMEN PELVIS W IV CONTRAST Additional Contrast? None   Final Result      1. Findings consistent with Crohn's disease with a severely inflamed loop of distal ileum with multiple enteroenteric fistulas and extraluminal phlegmon or abscess in the right anterior pelvis measuring 1.5 cm in size. This results in a focal    inflammatory stricture causing moderate to severe distention of the bowel loop just proximal to this bowel loop measuring up to 4.5 cm in size. 2.  Additional skip short segmental areas of inflammatory bowel involve the ileum. 3. Mild free fluid in the pelvis.               Assessment/Plan:    Active Hospital Problems    Diagnosis Date Noted    Severe malnutrition ([facility] [E43] 09/13/2019    [facility]'s [facility], [facility] abscess ([facility] [K50.114] 09/11/2019    Crohn's disease with abscess ([facility] [K50.914]         Crohn's Disease with enteroenteric fistulas and right

## 2019-09-17 LAB
ALBUMIN SERPL-MCNC: 3.5 G/DL (ref 3.4–5)
ANION GAP SERPL CALCULATED.3IONS-SCNC: 12 MMOL/L (ref 3–16)
BASOPHILS ABSOLUTE: 0 K/UL (ref 0–0.2)
BASOPHILS RELATIVE PERCENT: 0.1 %
BUN BLDV-MCNC: 7 MG/DL (ref 7–20)
CALCIUM SERPL-MCNC: 8.5 MG/DL (ref 8.3–10.6)
CHLORIDE BLD-SCNC: 101 MMOL/L (ref 99–110)
CO2: 26 MMOL/L (ref 21–32)
CREAT SERPL-MCNC: <0.5 MG/DL (ref 0.6–1.1)
EOSINOPHILS ABSOLUTE: 0 K/UL (ref 0–0.6)
EOSINOPHILS RELATIVE PERCENT: 0 %
GFR AFRICAN AMERICAN: >60
GFR NON-AFRICAN AMERICAN: >60
GLUCOSE BLD-MCNC: 151 MG/DL (ref 70–99)
HCT VFR BLD CALC: 33.6 % (ref 36–48)
HEMOGLOBIN: 11.4 G/DL (ref 12–16)
LYMPHOCYTES ABSOLUTE: 0.4 K/UL (ref 1–5.1)
LYMPHOCYTES RELATIVE PERCENT: 4.2 %
MAGNESIUM: 2.1 MG/DL (ref 1.8–2.4)
MCH RBC QN AUTO: 26.2 PG (ref 26–34)
MCHC RBC AUTO-ENTMCNC: 34 G/DL (ref 31–36)
MCV RBC AUTO: 77 FL (ref 80–100)
MONOCYTES ABSOLUTE: 0.4 K/UL (ref 0–1.3)
MONOCYTES RELATIVE PERCENT: 4.3 %
NEUTROPHILS ABSOLUTE: 8.1 K/UL (ref 1.7–7.7)
NEUTROPHILS RELATIVE PERCENT: 91.4 %
PDW BLD-RTO: 16.3 % (ref 12.4–15.4)
PHOSPHORUS: 2.8 MG/DL (ref 2.5–4.9)
PLATELET # BLD: 391 K/UL (ref 135–450)
PMV BLD AUTO: 8.1 FL (ref 5–10.5)
POTASSIUM SERPL-SCNC: 4.2 MMOL/L (ref 3.5–5.1)
RBC # BLD: 4.36 M/UL (ref 4–5.2)
SODIUM BLD-SCNC: 139 MMOL/L (ref 136–145)
WBC # BLD: 8.8 K/UL (ref 4–11)

## 2019-09-17 PROCEDURE — 36415 COLL VENOUS BLD VENIPUNCTURE: CPT

## 2019-09-17 PROCEDURE — 99232 SBSQ HOSP IP/OBS MODERATE 35: CPT | Performed by: SURGERY

## 2019-09-17 PROCEDURE — 6370000000 HC RX 637 (ALT 250 FOR IP): Performed by: STUDENT IN AN ORGANIZED HEALTH CARE EDUCATION/TRAINING PROGRAM

## 2019-09-17 PROCEDURE — 1200000000 HC SEMI PRIVATE

## 2019-09-17 PROCEDURE — 85025 COMPLETE CBC W/AUTO DIFF WBC: CPT

## 2019-09-17 PROCEDURE — 2500000003 HC RX 250 WO HCPCS: Performed by: INTERNAL MEDICINE

## 2019-09-17 PROCEDURE — 6360000002 HC RX W HCPCS: Performed by: INTERNAL MEDICINE

## 2019-09-17 PROCEDURE — 80069 RENAL FUNCTION PANEL: CPT

## 2019-09-17 PROCEDURE — 2580000003 HC RX 258: Performed by: INTERNAL MEDICINE

## 2019-09-17 PROCEDURE — 6370000000 HC RX 637 (ALT 250 FOR IP): Performed by: INTERNAL MEDICINE

## 2019-09-17 PROCEDURE — 83735 ASSAY OF MAGNESIUM: CPT

## 2019-09-17 RX ORDER — OXYCODONE HYDROCHLORIDE 5 MG/1
5 TABLET ORAL EVERY 4 HOURS PRN
Status: DISCONTINUED | OUTPATIENT
Start: 2019-09-17 | End: 2019-09-18 | Stop reason: HOSPADM

## 2019-09-17 RX ORDER — ACETAMINOPHEN 160 MG/5ML
1000 SOLUTION ORAL EVERY 6 HOURS PRN
Status: DISCONTINUED | OUTPATIENT
Start: 2019-09-17 | End: 2019-09-18 | Stop reason: HOSPADM

## 2019-09-17 RX ADMIN — OXYCODONE HYDROCHLORIDE 5 MG: 5 TABLET ORAL at 23:35

## 2019-09-17 RX ADMIN — Medication 40 MG: at 11:45

## 2019-09-17 RX ADMIN — METRONIDAZOLE 500 MG: 500 INJECTION, SOLUTION INTRAVENOUS at 23:35

## 2019-09-17 RX ADMIN — Medication 40 MG: at 23:36

## 2019-09-17 RX ADMIN — CIPROFLOXACIN 400 MG: 2 INJECTION, SOLUTION INTRAVENOUS at 08:07

## 2019-09-17 RX ADMIN — ANTACID TABLETS 500 MG: 500 TABLET, CHEWABLE ORAL at 11:45

## 2019-09-17 RX ADMIN — ONDANSETRON 4 MG: 2 INJECTION INTRAMUSCULAR; INTRAVENOUS at 21:21

## 2019-09-17 RX ADMIN — MORPHINE SULFATE 4 MG: 4 INJECTION INTRAVENOUS at 02:59

## 2019-09-17 RX ADMIN — ANTACID TABLETS 500 MG: 500 TABLET, CHEWABLE ORAL at 22:51

## 2019-09-17 RX ADMIN — CIPROFLOXACIN 400 MG: 2 INJECTION, SOLUTION INTRAVENOUS at 21:21

## 2019-09-17 RX ADMIN — Medication 10 ML: at 08:07

## 2019-09-17 RX ADMIN — METRONIDAZOLE 500 MG: 500 INJECTION, SOLUTION INTRAVENOUS at 05:48

## 2019-09-17 RX ADMIN — METRONIDAZOLE 500 MG: 500 INJECTION, SOLUTION INTRAVENOUS at 14:24

## 2019-09-17 RX ADMIN — OXYCODONE HYDROCHLORIDE 5 MG: 5 TABLET ORAL at 11:48

## 2019-09-17 RX ADMIN — OXYCODONE HYDROCHLORIDE 5 MG: 5 TABLET ORAL at 05:52

## 2019-09-17 RX ADMIN — Medication 10 ML: at 21:22

## 2019-09-17 ASSESSMENT — PAIN DESCRIPTION - PAIN TYPE
TYPE: ACUTE PAIN

## 2019-09-17 ASSESSMENT — PAIN DESCRIPTION - DESCRIPTORS
DESCRIPTORS: ACHING;CRAMPING
DESCRIPTORS: SHARP
DESCRIPTORS: SHARP;ACHING

## 2019-09-17 ASSESSMENT — PAIN DESCRIPTION - FREQUENCY
FREQUENCY: INTERMITTENT

## 2019-09-17 ASSESSMENT — PAIN SCALES - GENERAL
PAINLEVEL_OUTOF10: 4
PAINLEVEL_OUTOF10: 0
PAINLEVEL_OUTOF10: 8
PAINLEVEL_OUTOF10: 0
PAINLEVEL_OUTOF10: 3
PAINLEVEL_OUTOF10: 8

## 2019-09-17 ASSESSMENT — PAIN DESCRIPTION - LOCATION
LOCATION: ABDOMEN

## 2019-09-17 ASSESSMENT — PAIN DESCRIPTION - ORIENTATION
ORIENTATION: RIGHT;LOWER
ORIENTATION: RIGHT;LEFT
ORIENTATION: MID

## 2019-09-17 ASSESSMENT — PAIN DESCRIPTION - ONSET
ONSET: GRADUAL
ONSET: GRADUAL

## 2019-09-17 ASSESSMENT — PAIN DESCRIPTION - PROGRESSION
CLINICAL_PROGRESSION: GRADUALLY WORSENING
CLINICAL_PROGRESSION: GRADUALLY WORSENING

## 2019-09-17 NOTE — PROGRESS NOTES
General Surgery   Daily Progress Note      CC: Abdominal pain and nausea    SUBJECTIVE:  No acute events overnight. Has yet to pass a BM, though she continues to pass flatus. Continues to tolerate a full liquid diet. Pain improved    Review of Systems:   A 14 point review of systems was conducted, significant findings as noted in HPI. All other systems negative.     OBJECTIVE:  Exam:  Vitals:    09/16/19 1557 09/16/19 1933 09/17/19 0005 09/17/19 0258   BP: (!) 142/76 130/81 (!) 168/81 (!) 145/81   Pulse: (!) 48 54 58 51   Resp:  16 16 16   Temp: 97.6 °F (36.4 °C) 98 °F (36.7 °C) 98.2 °F (36.8 °C) 97.9 °F (36.6 °C)   TempSrc: Oral Oral Oral Oral   SpO2: 100% 99% 99% 98%   Weight:       Height:       Wt 113 lb (51.3 kg)   LMP 09/06/2019 (Exact Date)   SpO2 97%   BMI 18.80 kg/m²     Neuro: A&Ox3, no changes from previous day  General appearance: alert and cooperative  Lungs: Normal effort, breathing room air, no adventitious breath sounds  Heart: regular rate and rhythm, S1, S2    Abdomen: soft, minimally-tender, non-distended      ASSESSMENT/PLAN:   This is a 35 y.o. female with acute flare of chronic Crohn's colitis with strictures, multiple enteroenteric fistulas, abscess and history of medical noncompliance    - Leukocytosis resolved  - Cont fulls  - Tolerating enough PO nutrition to avoid PICC and TPN   - continue cipro/flagyl/steroids per GI  - Waiting for BM to prove that obstruction has resolve    Melba Ponce DO PGY1  General Surgery Resident  09/17/19 6:28 AM  [phone]

## 2019-09-17 NOTE — PROGRESS NOTES
VSS. Pt received 1 dose PRN morphine for 8/10 pain. Nausea tolerable so far this shift. Saline locked with intermittent antibiotics. + flatus, no BM yet. Will continue to monitor.

## 2019-09-18 VITALS
BODY MASS INDEX: 18.83 KG/M2 | HEART RATE: 60 BPM | WEIGHT: 113 LBS | DIASTOLIC BLOOD PRESSURE: 83 MMHG | SYSTOLIC BLOOD PRESSURE: 128 MMHG | TEMPERATURE: 98.2 F | RESPIRATION RATE: 16 BRPM | OXYGEN SATURATION: 100 % | HEIGHT: 65 IN

## 2019-09-18 PROCEDURE — 6360000002 HC RX W HCPCS: Performed by: INTERNAL MEDICINE

## 2019-09-18 PROCEDURE — 99231 SBSQ HOSP IP/OBS SF/LOW 25: CPT | Performed by: SURGERY

## 2019-09-18 PROCEDURE — 2580000003 HC RX 258: Performed by: INTERNAL MEDICINE

## 2019-09-18 PROCEDURE — 2500000003 HC RX 250 WO HCPCS: Performed by: INTERNAL MEDICINE

## 2019-09-18 PROCEDURE — 6370000000 HC RX 637 (ALT 250 FOR IP): Performed by: STUDENT IN AN ORGANIZED HEALTH CARE EDUCATION/TRAINING PROGRAM

## 2019-09-18 RX ORDER — BISACODYL 10 MG
10 SUPPOSITORY, RECTAL RECTAL ONCE
Status: COMPLETED | OUTPATIENT
Start: 2019-09-18 | End: 2019-09-18

## 2019-09-18 RX ORDER — METRONIDAZOLE 500 MG/1
500 TABLET ORAL 3 TIMES DAILY
Qty: 21 TABLET | Refills: 0 | Status: SHIPPED | OUTPATIENT
Start: 2019-09-18 | End: 2019-09-25

## 2019-09-18 RX ORDER — PREDNISONE 10 MG/1
TABLET ORAL
Qty: 12 TABLET | Refills: 0 | Status: SHIPPED | OUTPATIENT
Start: 2019-09-18 | End: 2019-09-27

## 2019-09-18 RX ORDER — BISACODYL 10 MG
10 SUPPOSITORY, RECTAL RECTAL ONCE
Status: DISCONTINUED | OUTPATIENT
Start: 2019-09-18 | End: 2019-09-18 | Stop reason: SDUPTHER

## 2019-09-18 RX ORDER — CIPROFLOXACIN 500 MG/1
500 TABLET, FILM COATED ORAL 2 TIMES DAILY
Qty: 14 TABLET | Refills: 0 | Status: SHIPPED | OUTPATIENT
Start: 2019-09-18 | End: 2019-09-25

## 2019-09-18 RX ADMIN — Medication 40 MG: at 09:36

## 2019-09-18 RX ADMIN — ENOXAPARIN SODIUM 40 MG: 40 INJECTION SUBCUTANEOUS at 09:36

## 2019-09-18 RX ADMIN — BISACODYL 10 MG: 10 SUPPOSITORY RECTAL at 09:38

## 2019-09-18 RX ADMIN — METRONIDAZOLE 500 MG: 500 INJECTION, SOLUTION INTRAVENOUS at 09:37

## 2019-09-18 RX ADMIN — CIPROFLOXACIN 400 MG: 2 INJECTION, SOLUTION INTRAVENOUS at 09:36

## 2019-09-18 RX ADMIN — Medication 10 ML: at 09:37

## 2019-09-18 ASSESSMENT — PAIN SCALES - GENERAL: PAINLEVEL_OUTOF10: 0

## 2019-09-18 NOTE — PROGRESS NOTES
Surgery Daily Progress Note  Maverick Sinha  CC: Abdominal pain and nausea       Subjective :No acute events overnight. Patient did experience an episode of abdominal cramping last night which resolved on its own.  + flatus. Denies any BM. Voiding freely, tolerating FLD      Objective    Infusions:     I/O:I/O last 3 completed shifts: In: 9947 [P.O.:740; I.V.:10; IV Piggyback:700]  Out: -            Wt Readings from Last 1 Encounters:   09/11/19 113 lb (51.3 kg)                 LABS:    Recent Labs     09/16/19  0544 09/17/19  0443   WBC 9.3 8.8   HGB 11.6* 11.4*   HCT 34.5* 33.6*   MCV 77.6* 77.0*    391        Recent Labs     09/16/19  0544 09/17/19  0443    139   K 4.1 4.2    101   CO2 25 26   PHOS 3.0 2.8   BUN 5* 7   CREATININE <0.5* <0.5*      No results for input(s): AST, ALT, ALB, BILIDIR, BILITOT, ALKPHOS in the last 72 hours. No results for input(s): LIPASE, AMYLASE in the last 72 hours. No results for input(s): PROT, INR, APTT in the last 72 hours. No results for input(s): CKTOTAL, CKMB, CKMBINDEX, TROPONINI in the last 72 hours.             Exam:/82   Pulse 59   Temp 97.1 °F (36.2 °C) (Oral)   Resp 16   Ht 5' 5\" (1.651 m)   Wt 113 lb (51.3 kg)   LMP 09/06/2019 (Exact Date)   SpO2 99%   BMI 18.80 kg/m²   General appearance: alert, appears stated age and cooperative  Lungs: clear to auscultation bilaterally  Heart: regular rate and rhythm, S1, S2   Abdomen: soft, non-tender; bowel sounds present      ASSESSMENT/PLAN: Pt. is a 35 y.o. female with acute flare of chronic Crohn's colitis with strictures, multiple enteroenteric fistulas, abscess and history of medical noncompliance    - Leukocytosis has resolved  - Will continue on FLD until BM - then will discuss possible advancement  - Continue to encourage OOB/Ambulation  - Will give suppository  - Continue Cipro/Flagyl/Steroids per BASSAM Garner 9/18/2019 6:25 AM  [phone]

## 2019-09-18 NOTE — PROGRESS NOTES
sensory/motor deficits. Cranial nerves: II-XII intact, grossly non-focal.  Psychiatric: Alert and oriented, thought content appropriate, normal insight  Capillary Refill: Brisk,< 3 seconds   Peripheral Pulses: +2 palpable, equal bilaterally       Labs:   Recent Labs     09/16/19  0544 09/17/19  0443   WBC 9.3 8.8   HGB 11.6* 11.4*   HCT 34.5* 33.6*    391     Recent Labs     09/16/19  0544 09/17/19  0443    139   K 4.1 4.2    101   CO2 25 26   BUN 5* 7   CREATININE <0.5* <0.5*   CALCIUM 8.6 8.5   PHOS 3.0 2.8     No results for input(s): AST, ALT, BILIDIR, BILITOT, ALKPHOS in the last 72 hours. No results for input(s): INR in the last 72 hours. No results for input(s): Mis Pickler in the last 72 hours. Urinalysis:      Lab Results   Component Value Date    NITRU Negative 09/11/2019    WBCUA 0-2 09/11/2019    BACTERIA Rare 09/11/2019    RBCUA 0-2 09/11/2019    BLOODU LARGE 09/11/2019    SPECGRAV >=1.030 09/11/2019    GLUCOSEU Negative 09/11/2019       Radiology:  CT ABDOMEN PELVIS W IV CONTRAST Additional Contrast? None   Final Result      1. Findings consistent with Crohn's disease with a severely inflamed loop of distal ileum with multiple enteroenteric fistulas and extraluminal phlegmon or abscess in the right anterior pelvis measuring 1.5 cm in size. This results in a focal    inflammatory stricture causing moderate to severe distention of the bowel loop just proximal to this bowel loop measuring up to 4.5 cm in size. 2.  Additional skip short segmental areas of inflammatory bowel involve the ileum. 3. Mild free fluid in the pelvis.               Assessment/Plan:    Active Hospital Problems    Diagnosis Date Noted    Severe malnutrition ([address] [E43] 09/13/2019    Crohn's colitis, with abscess ([facility] [K50.114] 09/11/2019    Crohn's disease with abscess ([facility] [K50.914]         Crohn's Disease with enteroenteric fistulas and right anterior pelvis abscess 1.5 cm in size with inflammatory stricture with small bowel dilatation:  Cont IV abx and steroids  Cont prn antiemetics and pain medication  On full liquid diet now , tolerating. Diet advancement per surgery  Awaiting BM      Dehydration:  S/p IVF. Iron deficiency anemia:  Receives IV iron infusion outpatient  Does not tolerate PO iron  Monitor H&H, hemoglobin has been stable.   Transfuse if Hgb <7g/dl      DVT Prophylaxis: Lovenox  Diet: Dietary Nutrition Supplements: Standard High Calorie Oral Supplement  DIET LOW FIBER;  Code Status: Full Code    Dispo - pending BM, per surgery   Deshaun Farooq MD

## 2019-09-18 NOTE — CARE COORDINATION
Cm following, pt awaiting BM for diet to advance. Tolerating PO Full liquid diet, cont IV ABX and steroids. Will DC home no needs.   Electronically signed by Chad James RN on 9/18/2019 at 11:02 AM  [phone]
Cm following, pt tolerating PO CLD, cont cipro/flagyl/steroids, awaiting return Gi function and +BM so diet can be advanced. Will return home no needs.    Electronically signed by Abigail Soriano RN on 9/16/2019 at 4:08 PM  [phone]
Indicated    Notification completed in HENS/PAS?:  Not Applicable    IMM Completed:   Not Indicated    Transportation:  Transportation PLAN for discharge: family   Mode of Transport: Private [place] ordered at discharge: No  [address]: Not Applicable      Additional CM Notes: pt from home DC no needs, will Need Lyft to get home    Corbin Pierre and her family were provided with choice of provider; she and her family are in agreement with the discharge plan.     Care Transitions patient: No    Evan Cowden, RN  [facility]  Case Management Department  Ph: [phone]  Fax: [phone]

## 2019-09-19 NOTE — DISCHARGE SUMMARY
rhythm with normal S1/S2 without murmurs, rubs or gallops. Abdomen: Soft, non-tender, non-distended with normal bowel sounds. Musculoskeletal:  No clubbing, cyanosis or edema bilaterally. Full range of motion without deformity. Skin: Skin color, texture, turgor normal.  No rashes or lesions. Neurologic:  Neurovascularly intact without any focal sensory/motor deficits. Cranial nerves: II-XII intact, grossly non-focal.  Psychiatric:  Alert and oriented, thought content appropriate, normal insight  Capillary Refill: Brisk,< 3 seconds   Peripheral Pulses: +2 palpable, equal bilaterally       Labs: For convenience and continuity at follow-up the following most recent labs are provided:      CBC:    Lab Results   Component Value Date    WBC 8.8 09/17/2019    HGB 11.4 09/17/2019    HCT 33.6 09/17/2019     09/17/2019       Renal:    Lab Results   Component Value Date     09/17/2019    K 4.2 09/17/2019    K 4.0 09/12/2019     09/17/2019    CO2 26 09/17/2019    BUN 7 09/17/2019    CREATININE <0.5 09/17/2019    CALCIUM 8.5 09/17/2019    PHOS 2.8 09/17/2019         Significant Diagnostic Studies    Radiology:   CT ABDOMEN PELVIS W IV CONTRAST Additional Contrast? None   Final Result      1. Findings consistent with Crohn's disease with a severely inflamed loop of distal ileum with multiple enteroenteric fistulas and extraluminal phlegmon or abscess in the right anterior pelvis measuring 1.5 cm in size. This results in a focal    inflammatory stricture causing moderate to severe distention of the bowel loop just proximal to this bowel loop measuring up to 4.5 cm in size. 2.  Additional skip short segmental areas of inflammatory bowel involve the ileum. 3. Mild free fluid in the pelvis.              Consults:     IP CONSULT TO GI  IP CONSULT TO GENERAL SURGERY  IP CONSULT TO HOSPITALIST  IP CONSULT TO GI    Disposition:  Home      Condition at Discharge: Stable    Discharge Instructions/Follow-up:  GI

## 2019-09-24 NOTE — ADT AUTH CERT
Inflammatory Bowel Disease - Care Day 7 (9/17/2019) by Frankie Rahman RN         Review Status Review Entered   Completed 9/17/2019 15:55       Criteria Review      Care Day: 7 Care Date: 9/17/2019 Level of Care:    Guideline Day 3    Clinical Status    ( ) * Hemodynamic stability    (X) * Fever absent    ( ) * Bloody diarrhea absent or improved sufficiently for next level of care    ( ) * No evidence of infection requiring inpatient care    ( ) * Abdominal pain absent or managed    ( ) * Number of stools per 24 hours at baseline or acceptable for next level of care    ( ) * Renal function at baseline or appropriate for next level of care    ( ) * Electrolytes normal or improved    ( ) * Hgb/Hct stable    ( ) * No evidence of peritonitis or abscess    ( ) * Surgery not indicated    ( ) * Diet tolerated    ( ) * Discharge plans and education understood    Activity    ( ) * Ambulatory    Routes    ( ) * Oral hydration, medications, [E] and diet    9/17/2019 3:55 PM EDT by Javi Ellison      full liquids    Interventions    (X) CBC, electrolytes    Medications    (X) Possible systemic corticosteroids    9/17/2019 3:55 PM EDT by Javi Ellison      solumedrol 40mg iv q12    (X) Possible antibiotic, immunomodulator, or other agents    9/17/2019 3:55 PM EDT by Javi Ellison      cipro ivpb q12, flagyl ivpb q8,    (X) DVT prophylaxis    * Milestone   Additional Notes    09/17/19 0808  97.8 (36.6)  16  53  129/75  -  -  -  -  100  None (Room air)    Renal Function Panel [[id number]] (Abnormal) Collected: 09/17/19 0443      Specimen: Blood Updated: 09/17/19 0524      Sodium 139 mmol/L      Potassium 4.2 mmol/L      Chloride 101 mmol/L      CO2 26 mmol/L      Anion Gap 12      Glucose 151 mg/dL      BUN 7 mg/dL      CREATININE <0.5 mg/dL   GFR Non-African American >60      GFR African American >60      Calcium 8.5 mg/dL      Phosphorus 2.8 mg/dL      Alb 3.5 g/dL    CBC Auto Differential [[id number]] (Abnormal) Collected: Hemodynamic stability    (X) * Fever absent    ( ) * Bloody diarrhea absent or improved sufficiently for next level of care    ( ) * No evidence of infection requiring inpatient care    ( ) * Abdominal pain absent or managed    ( ) * Number of stools per 24 hours at baseline or acceptable for next level of care    ( ) * Renal function at baseline or appropriate for next level of care    ( ) * Electrolytes normal or improved    ( ) * Hgb/Hct stable    ( ) * No evidence of peritonitis or abscess    ( ) * Surgery not indicated    ( ) * Diet tolerated    ( ) * Discharge plans and education understood    Activity    ( ) * Ambulatory    Routes    ( ) * Oral hydration, medications, [E] and diet    9/17/2019 3:55 PM EDT by [employer] Dials      full liquids    Interventions    (X) CBC, electrolytes    Medications    (X) Possible systemic corticosteroids    9/17/2019 3:55 PM EDT by [employer] D[employer]      solumedrol 40mg iv q12    (X) Possible antibiotic, immunomodulator, or other agents    9/17/2019 3:55 PM EDT by [employer] Dials      cipro ivpb q12, flagyl ivpb q8,    (X) DVT prophylaxis    * Milestone   Additional Notes   09/16/19 0930  98 (36.7)  17  58  148/81Abnormal  -  Supine  -  -  99  None (Room air)     09/16/19 15:57:11  97.6 (36.4)  -  48Abnormal  142/76Abnormal        CBC Auto Differential [[id number]] (Abnormal) Collected: 09/16/19 0544      Specimen: Blood Updated: 09/16/19 0632      WBC 9.3 K/uL      RBC 4.45 M/uL      Hemoglobin 11.6 g/dL      Hematocrit 34.5 %      MCV 77.6 fL      MCH 26.1 pg      MCHC 33.7 g/dL      RDW 15.9 %      Platelets 924 K/uL      MPV 8.3 fL      Neutrophils % 90.7 %      Lymphocytes % 3.8 %      Monocytes % 5.5 %      Eosinophils % 0.0 %      Basophils % 0.0 %      Neutrophils Absolute 8.4 K/uL      Lymphocytes Absolute 0.4 K/uL      Monocytes Absolute 0.5 K/uL      Eosinophils Absolute 0.0 K/uL      Basophils Absolute 0.0 K/uL    Renal Function Panel [[id number]] (Abnormal) Collected: 09/16/19 0544      Specimen: Blood Updated: 09/16/19 0649      Sodium 138 mmol/L      Potassium 4.1 mmol/L      Chloride 101 mmol/L      CO2 25 mmol/L      Anion Gap 12      Glucose 143 mg/dL      BUN 5 mg/dL      CREATININE <0.5 mg/dL   GFR Non-African American >60      GFR African American >60      Calcium 8.6 mg/dL      Phosphorus 3.0 mg/dL      Alb 3.7 g/dL    Scheduled Meds:methylPREDNISolone, 40 mg, Intravenous, Q12H    sodium chloride flush, 10 mL, Intravenous, 2 times per day    enoxaparin, 40 mg, Subcutaneous, Daily    ciprofloxacin, 400 mg, Intravenous, Q12H    metroNIDAZOLE, 500 mg, Intravenous, Q8H    PRN Meds:.acetaminophen, calcium carbonate x1, prochlorperazine, sodium chloride flush, magnesium hydroxide, ondansetron x1, morphine iv x2    GenSurg:    SUBJECTIVE:    Patient rested well overnight. Has yet to pass a BM, though she continues to pass flatus. Continues to tolerate a full liquid diet well-- soup, ensure, jell-O, and grits. Pain improved. ASSESSMENT/PLAN:    This is a 35 y.o. female with acute flare of chronic Crohn's colitis with strictures, multiple enteroenteric fistulas, abscess and history of medical noncompliance         - Leukocytosis resolved    - Tolerating enough PO nutrition to avoid PICC and TPN    - continue cipro/flagyl/steroids per GI    - Waiting for BM to prove that obstruction has resolve, after which diet will be advanced as appropriate    IM:    Assessment/Plan:         Active Hospital Problems      Diagnosis Date Noted     Severe malnutrition ([facility] [E43] 09/13/2019     Crohn's colitis, with abscess ([facility] [K50.114] 09/11/2019     Crohn's disease with abscess ([facility] [K50.914]                Crohn's Disease with enteroenteric fistulas and right anterior pelvis abscess 1.5 cm in size with inflammatory stricture with small bowel dilatation:    Cont IV abx and steroids    Cont prn antiemetics and pain medication    On full liquid diet now , tolerating.     Diet advancement q8,    (X) DVT prophylaxis    * Milestone   Additional Notes    09/15/19 0725  97.5 (36.4)  14  46Abnormal  119/72  -  Lying left side  -  -  100  None (Room air)    CBC Auto Differential [[id number]] (Abnormal) Collected: 09/15/19 0533      Specimen: Blood Updated: 09/15/19 0637      WBC 9.4 K/uL      RBC 4.35 M/uL      Hemoglobin 11.6 g/dL      Hematocrit 33.7 %      MCV 77.6 fL      MCH 26.6 pg      MCHC 34.3 g/dL      RDW 15.7 %      Platelets 194 K/uL      MPV 8.2 fL      Neutrophils % 90.2 %      Lymphocytes % 4.4 %      Monocytes % 5.4 %      Eosinophils % 0.0 %      Basophils % 0.0 %      Neutrophils Absolute 8.5 K/uL      Lymphocytes Absolute 0.4 K/uL      Monocytes Absolute 0.5 K/uL      Eosinophils Absolute 0.0 K/uL      Basophils Absolute 0.0 K/uL      C-Reactive Protein [[id number]] (Abnormal) Collected: 09/15/19 0533      Updated: 09/15/19 0637      CRP 5.9 mg/L    Renal Function Panel [[id number]] (Abnormal) Collected: 09/15/19 0533      Specimen: Blood Updated: 09/15/19 3173      Sodium 141 mmol/L      Potassium 4.3 mmol/L      Chloride 105 mmol/L      CO2 25 mmol/L      Anion Gap 11      Glucose 151 mg/dL      BUN 5 mg/dL      CREATININE <0.5 mg/dL   GFR Non-African American >60      GFR African American >60      Calcium 8.3 mg/dL      Phosphorus 2.7 mg/dL      Alb 3.3 g/dL    Scheduled Meds:methylPREDNISolone, 40 mg, Intravenous, Q12H    sodium chloride flush, 10 mL, Intravenous, 2 times per day    enoxaparin, 40 mg, Subcutaneous, Daily    ciprofloxacin, 400 mg, Intravenous, Q12H    metroNIDAZOLE, 500 mg, Intravenous, Q8H    PRN Meds:. acetaminophen, calcium carbonate, prochlorperazine x1, sodium chloride flush, magnesium hydroxide, ondansetron x2, morphine iv x3    GenSurg:    ASSESSMENT/PLAN:    This is a 35 y.o. female with acute flare of chronic Crohn's colitis with strictures, multiple enteroenteric fistulas, abscess and history of medical noncompliance         - WBC 9 from 12 today    -

## 2019-09-30 ENCOUNTER — APPOINTMENT (OUTPATIENT)
Dept: CT IMAGING | Age: 39
DRG: 248 | End: 2019-09-30
Payer: MEDICAID

## 2019-09-30 ENCOUNTER — HOSPITAL ENCOUNTER (INPATIENT)
Age: 39
LOS: 2 days | Discharge: HOME | DRG: 248 | End: 2019-10-03
Attending: EMERGENCY MEDICINE | Admitting: INTERNAL MEDICINE
Payer: MEDICAID

## 2019-09-30 DIAGNOSIS — K50.114: Primary | ICD-10-CM

## 2019-09-30 LAB
ALBUMIN SERPL-MCNC: 4.1 G/DL (ref 3.4–5)
ALP BLD-CCNC: 40 U/L (ref 40–129)
ALT SERPL-CCNC: 9 U/L (ref 10–40)
ANION GAP SERPL CALCULATED.3IONS-SCNC: 9 MMOL/L (ref 3–16)
AST SERPL-CCNC: 12 U/L (ref 15–37)
BACTERIA: ABNORMAL /HPF
BASOPHILS ABSOLUTE: 0.1 K/UL (ref 0–0.2)
BASOPHILS RELATIVE PERCENT: 0.4 %
BILIRUB SERPL-MCNC: 0.6 MG/DL (ref 0–1)
BILIRUBIN DIRECT: <0.2 MG/DL (ref 0–0.3)
BILIRUBIN URINE: NEGATIVE
BILIRUBIN, INDIRECT: ABNORMAL MG/DL (ref 0–1)
BLOOD, URINE: NEGATIVE
BUN BLDV-MCNC: 7 MG/DL (ref 7–20)
CALCIUM SERPL-MCNC: 8.9 MG/DL (ref 8.3–10.6)
CHLORIDE BLD-SCNC: 103 MMOL/L (ref 99–110)
CLARITY: CLEAR
CO2: 25 MMOL/L (ref 21–32)
COLOR: YELLOW
CREAT SERPL-MCNC: 0.5 MG/DL (ref 0.6–1.1)
EOSINOPHILS ABSOLUTE: 0.1 K/UL (ref 0–0.6)
EOSINOPHILS RELATIVE PERCENT: 0.4 %
EPITHELIAL CELLS, UA: ABNORMAL /HPF
GFR AFRICAN AMERICAN: >60
GFR NON-AFRICAN AMERICAN: >60
GLUCOSE BLD-MCNC: 66 MG/DL (ref 70–99)
GLUCOSE URINE: NEGATIVE MG/DL
HCT VFR BLD CALC: 39.4 % (ref 36–48)
HEMOGLOBIN: 13.4 G/DL (ref 12–16)
KETONES, URINE: NEGATIVE MG/DL
LEUKOCYTE ESTERASE, URINE: ABNORMAL
LIPASE: 24 U/L (ref 13–60)
LYMPHOCYTES ABSOLUTE: 1.2 K/UL (ref 1–5.1)
LYMPHOCYTES RELATIVE PERCENT: 8.8 %
MCH RBC QN AUTO: 26.1 PG (ref 26–34)
MCHC RBC AUTO-ENTMCNC: 34.1 G/DL (ref 31–36)
MCV RBC AUTO: 76.7 FL (ref 80–100)
MICROSCOPIC EXAMINATION: YES
MONOCYTES ABSOLUTE: 1.5 K/UL (ref 0–1.3)
MONOCYTES RELATIVE PERCENT: 11.8 %
MUCUS: ABNORMAL /LPF
NEUTROPHILS ABSOLUTE: 10.3 K/UL (ref 1.7–7.7)
NEUTROPHILS RELATIVE PERCENT: 78.6 %
NITRITE, URINE: NEGATIVE
PDW BLD-RTO: 17.6 % (ref 12.4–15.4)
PH UA: 6 (ref 5–8)
PLATELET # BLD: 366 K/UL (ref 135–450)
PMV BLD AUTO: 7.9 FL (ref 5–10.5)
POTASSIUM SERPL-SCNC: 4.2 MMOL/L (ref 3.5–5.1)
PROTEIN UA: ABNORMAL MG/DL
RBC # BLD: 5.14 M/UL (ref 4–5.2)
RBC UA: ABNORMAL /HPF (ref 0–2)
SODIUM BLD-SCNC: 137 MMOL/L (ref 136–145)
SPECIFIC GRAVITY UA: >=1.03 (ref 1–1.03)
TOTAL PROTEIN: 7.5 G/DL (ref 6.4–8.2)
URINE TYPE: ABNORMAL
UROBILINOGEN, URINE: 0.2 E.U./DL
WBC # BLD: 13.2 K/UL (ref 4–11)
WBC UA: ABNORMAL /HPF (ref 0–5)

## 2019-09-30 PROCEDURE — 6360000002 HC RX W HCPCS: Performed by: EMERGENCY MEDICINE

## 2019-09-30 PROCEDURE — 80048 BASIC METABOLIC PNL TOTAL CA: CPT

## 2019-09-30 PROCEDURE — 99285 EMERGENCY DEPT VISIT HI MDM: CPT

## 2019-09-30 PROCEDURE — 80076 HEPATIC FUNCTION PANEL: CPT

## 2019-09-30 PROCEDURE — 85025 COMPLETE CBC W/AUTO DIFF WBC: CPT

## 2019-09-30 PROCEDURE — 96375 TX/PRO/DX INJ NEW DRUG ADDON: CPT

## 2019-09-30 PROCEDURE — 81001 URINALYSIS AUTO W/SCOPE: CPT

## 2019-09-30 PROCEDURE — 83690 ASSAY OF LIPASE: CPT

## 2019-09-30 PROCEDURE — 36415 COLL VENOUS BLD VENIPUNCTURE: CPT

## 2019-09-30 PROCEDURE — 96374 THER/PROPH/DIAG INJ IV PUSH: CPT

## 2019-09-30 RX ORDER — ONDANSETRON 2 MG/ML
4 INJECTION INTRAMUSCULAR; INTRAVENOUS ONCE
Status: COMPLETED | OUTPATIENT
Start: 2019-09-30 | End: 2019-09-30

## 2019-09-30 RX ADMIN — ONDANSETRON 4 MG: 2 INJECTION INTRAMUSCULAR; INTRAVENOUS at 22:22

## 2019-09-30 RX ADMIN — HYDROMORPHONE HYDROCHLORIDE 1 MG: 1 INJECTION, SOLUTION INTRAMUSCULAR; INTRAVENOUS; SUBCUTANEOUS at 22:22

## 2019-09-30 ASSESSMENT — PAIN SCALES - GENERAL
PAINLEVEL_OUTOF10: 9
PAINLEVEL_OUTOF10: 10

## 2019-09-30 ASSESSMENT — PAIN DESCRIPTION - PAIN TYPE: TYPE: ACUTE PAIN

## 2019-09-30 ASSESSMENT — PAIN DESCRIPTION - LOCATION: LOCATION: ABDOMEN

## 2019-10-01 ENCOUNTER — APPOINTMENT (OUTPATIENT)
Dept: CT IMAGING | Age: 39
DRG: 248 | End: 2019-10-01
Payer: MEDICAID

## 2019-10-01 PROBLEM — K65.1 ABDOMINAL VISCERAL ABSCESS (HCC): Status: ACTIVE | Noted: 2019-10-01

## 2019-10-01 LAB
ANION GAP SERPL CALCULATED.3IONS-SCNC: 11 MMOL/L (ref 3–16)
BASOPHILS ABSOLUTE: 0 K/UL (ref 0–0.2)
BASOPHILS RELATIVE PERCENT: 0.4 %
BUN BLDV-MCNC: 5 MG/DL (ref 7–20)
CALCIUM SERPL-MCNC: 8.3 MG/DL (ref 8.3–10.6)
CHLORIDE BLD-SCNC: 103 MMOL/L (ref 99–110)
CO2: 22 MMOL/L (ref 21–32)
CREAT SERPL-MCNC: <0.5 MG/DL (ref 0.6–1.1)
EOSINOPHILS ABSOLUTE: 0.1 K/UL (ref 0–0.6)
EOSINOPHILS RELATIVE PERCENT: 0.8 %
GFR AFRICAN AMERICAN: >60
GFR NON-AFRICAN AMERICAN: >60
GLUCOSE BLD-MCNC: 82 MG/DL (ref 70–99)
HCG QUALITATIVE: NEGATIVE
HCG(URINE) PREGNANCY TEST: NEGATIVE
HCT VFR BLD CALC: 34.9 % (ref 36–48)
HEMOGLOBIN: 11.9 G/DL (ref 12–16)
LYMPHOCYTES ABSOLUTE: 0.7 K/UL (ref 1–5.1)
LYMPHOCYTES RELATIVE PERCENT: 9.4 %
MCH RBC QN AUTO: 25.9 PG (ref 26–34)
MCHC RBC AUTO-ENTMCNC: 34.2 G/DL (ref 31–36)
MCV RBC AUTO: 75.6 FL (ref 80–100)
MONOCYTES ABSOLUTE: 1 K/UL (ref 0–1.3)
MONOCYTES RELATIVE PERCENT: 12.4 %
NEUTROPHILS ABSOLUTE: 6.1 K/UL (ref 1.7–7.7)
NEUTROPHILS RELATIVE PERCENT: 77 %
PDW BLD-RTO: 17.4 % (ref 12.4–15.4)
PLATELET # BLD: 324 K/UL (ref 135–450)
PMV BLD AUTO: 7.5 FL (ref 5–10.5)
POTASSIUM REFLEX MAGNESIUM: 4.1 MMOL/L (ref 3.5–5.1)
RBC # BLD: 4.62 M/UL (ref 4–5.2)
SODIUM BLD-SCNC: 136 MMOL/L (ref 136–145)
WBC # BLD: 7.9 K/UL (ref 4–11)

## 2019-10-01 PROCEDURE — 2500000003 HC RX 250 WO HCPCS: Performed by: INTERNAL MEDICINE

## 2019-10-01 PROCEDURE — 6360000002 HC RX W HCPCS: Performed by: INTERNAL MEDICINE

## 2019-10-01 PROCEDURE — 6360000002 HC RX W HCPCS: Performed by: EMERGENCY MEDICINE

## 2019-10-01 PROCEDURE — 85025 COMPLETE CBC W/AUTO DIFF WBC: CPT

## 2019-10-01 PROCEDURE — 6360000004 HC RX CONTRAST MEDICATION: Performed by: EMERGENCY MEDICINE

## 2019-10-01 PROCEDURE — 96375 TX/PRO/DX INJ NEW DRUG ADDON: CPT

## 2019-10-01 PROCEDURE — 2580000003 HC RX 258: Performed by: EMERGENCY MEDICINE

## 2019-10-01 PROCEDURE — 6370000000 HC RX 637 (ALT 250 FOR IP): Performed by: INTERNAL MEDICINE

## 2019-10-01 PROCEDURE — 36415 COLL VENOUS BLD VENIPUNCTURE: CPT

## 2019-10-01 PROCEDURE — 74177 CT ABD & PELVIS W/CONTRAST: CPT

## 2019-10-01 PROCEDURE — 84703 CHORIONIC GONADOTROPIN ASSAY: CPT

## 2019-10-01 PROCEDURE — 1200000000 HC SEMI PRIVATE

## 2019-10-01 PROCEDURE — 80048 BASIC METABOLIC PNL TOTAL CA: CPT

## 2019-10-01 PROCEDURE — 99253 IP/OBS CNSLTJ NEW/EST LOW 45: CPT | Performed by: SURGERY

## 2019-10-01 PROCEDURE — 2580000003 HC RX 258: Performed by: INTERNAL MEDICINE

## 2019-10-01 RX ORDER — SODIUM CHLORIDE 0.9 % (FLUSH) 0.9 %
10 SYRINGE (ML) INJECTION EVERY 12 HOURS SCHEDULED
Status: DISCONTINUED | OUTPATIENT
Start: 2019-10-01 | End: 2019-10-03 | Stop reason: HOSPADM

## 2019-10-01 RX ORDER — ONDANSETRON 2 MG/ML
4 INJECTION INTRAMUSCULAR; INTRAVENOUS EVERY 4 HOURS PRN
Status: DISCONTINUED | OUTPATIENT
Start: 2019-10-01 | End: 2019-10-03 | Stop reason: HOSPADM

## 2019-10-01 RX ORDER — PREDNISONE 20 MG/1
20 TABLET ORAL DAILY
Status: DISCONTINUED | OUTPATIENT
Start: 2019-10-01 | End: 2019-10-03 | Stop reason: HOSPADM

## 2019-10-01 RX ORDER — DROPERIDOL 2.5 MG/ML
1.25 INJECTION, SOLUTION INTRAMUSCULAR; INTRAVENOUS ONCE
Status: COMPLETED | OUTPATIENT
Start: 2019-10-01 | End: 2019-10-01

## 2019-10-01 RX ORDER — ACETAMINOPHEN 325 MG/1
650 TABLET ORAL EVERY 4 HOURS PRN
Status: DISCONTINUED | OUTPATIENT
Start: 2019-10-01 | End: 2019-10-03 | Stop reason: HOSPADM

## 2019-10-01 RX ORDER — SODIUM CHLORIDE 9 MG/ML
INJECTION, SOLUTION INTRAVENOUS CONTINUOUS
Status: DISCONTINUED | OUTPATIENT
Start: 2019-10-01 | End: 2019-10-03 | Stop reason: HOSPADM

## 2019-10-01 RX ORDER — BISACODYL 10 MG
10 SUPPOSITORY, RECTAL RECTAL DAILY PRN
Status: DISCONTINUED | OUTPATIENT
Start: 2019-10-01 | End: 2019-10-03 | Stop reason: HOSPADM

## 2019-10-01 RX ORDER — SODIUM CHLORIDE 0.9 % (FLUSH) 0.9 %
10 SYRINGE (ML) INJECTION PRN
Status: DISCONTINUED | OUTPATIENT
Start: 2019-10-01 | End: 2019-10-03 | Stop reason: HOSPADM

## 2019-10-01 RX ORDER — MORPHINE SULFATE 2 MG/ML
2 INJECTION, SOLUTION INTRAMUSCULAR; INTRAVENOUS
Status: DISCONTINUED | OUTPATIENT
Start: 2019-10-01 | End: 2019-10-03 | Stop reason: HOSPADM

## 2019-10-01 RX ADMIN — IOHEXOL 50 ML: 240 INJECTION, SOLUTION INTRATHECAL; INTRAVASCULAR; INTRAVENOUS; ORAL at 00:28

## 2019-10-01 RX ADMIN — DROPERIDOL 1.25 MG: 2.5 INJECTION, SOLUTION INTRAMUSCULAR; INTRAVENOUS at 02:28

## 2019-10-01 RX ADMIN — MEROPENEM 1 G: 1 INJECTION, POWDER, FOR SOLUTION INTRAVENOUS at 18:57

## 2019-10-01 RX ADMIN — FAMOTIDINE 20 MG: 10 INJECTION, SOLUTION INTRAVENOUS at 09:56

## 2019-10-01 RX ADMIN — MEROPENEM 1 G: 1 INJECTION, POWDER, FOR SOLUTION INTRAVENOUS at 09:56

## 2019-10-01 RX ADMIN — FAMOTIDINE 20 MG: 10 INJECTION, SOLUTION INTRAVENOUS at 21:25

## 2019-10-01 RX ADMIN — IOPAMIDOL 80 ML: 755 INJECTION, SOLUTION INTRAVENOUS at 00:28

## 2019-10-01 RX ADMIN — MORPHINE SULFATE 2 MG: 2 INJECTION, SOLUTION INTRAMUSCULAR; INTRAVENOUS at 14:54

## 2019-10-01 RX ADMIN — PREDNISONE 20 MG: 20 TABLET ORAL at 14:54

## 2019-10-01 RX ADMIN — MORPHINE SULFATE 2 MG: 2 INJECTION, SOLUTION INTRAMUSCULAR; INTRAVENOUS at 19:09

## 2019-10-01 RX ADMIN — MEROPENEM 1 G: 1 INJECTION, POWDER, FOR SOLUTION INTRAVENOUS at 02:57

## 2019-10-01 RX ADMIN — SODIUM CHLORIDE: 9 INJECTION, SOLUTION INTRAVENOUS at 06:47

## 2019-10-01 ASSESSMENT — PAIN DESCRIPTION - PAIN TYPE: TYPE: ACUTE PAIN

## 2019-10-01 ASSESSMENT — PAIN DESCRIPTION - LOCATION: LOCATION: ABDOMEN

## 2019-10-01 ASSESSMENT — PAIN DESCRIPTION - DESCRIPTORS: DESCRIPTORS: ACHING;CRAMPING

## 2019-10-01 ASSESSMENT — PAIN DESCRIPTION - ORIENTATION: ORIENTATION: MID

## 2019-10-01 ASSESSMENT — PAIN DESCRIPTION - FREQUENCY: FREQUENCY: INTERMITTENT

## 2019-10-01 ASSESSMENT — PAIN DESCRIPTION - PROGRESSION: CLINICAL_PROGRESSION: GRADUALLY WORSENING

## 2019-10-01 ASSESSMENT — PAIN DESCRIPTION - ONSET: ONSET: GRADUAL

## 2019-10-01 ASSESSMENT — PAIN - FUNCTIONAL ASSESSMENT: PAIN_FUNCTIONAL_ASSESSMENT: ACTIVITIES ARE NOT PREVENTED

## 2019-10-01 ASSESSMENT — PAIN SCALES - GENERAL: PAINLEVEL_OUTOF10: 6

## 2019-10-02 LAB
ANION GAP SERPL CALCULATED.3IONS-SCNC: 12 MMOL/L (ref 3–16)
BASOPHILS ABSOLUTE: 0 K/UL (ref 0–0.2)
BASOPHILS RELATIVE PERCENT: 0.4 %
BUN BLDV-MCNC: 5 MG/DL (ref 7–20)
CALCIUM SERPL-MCNC: 8.1 MG/DL (ref 8.3–10.6)
CHLORIDE BLD-SCNC: 106 MMOL/L (ref 99–110)
CO2: 22 MMOL/L (ref 21–32)
CREAT SERPL-MCNC: <0.5 MG/DL (ref 0.6–1.1)
EOSINOPHILS ABSOLUTE: 0 K/UL (ref 0–0.6)
EOSINOPHILS RELATIVE PERCENT: 0.4 %
GFR AFRICAN AMERICAN: >60
GFR NON-AFRICAN AMERICAN: >60
GLUCOSE BLD-MCNC: 113 MG/DL (ref 70–99)
HCT VFR BLD CALC: 34 % (ref 36–48)
HEMOGLOBIN: 11.3 G/DL (ref 12–16)
LYMPHOCYTES ABSOLUTE: 0.6 K/UL (ref 1–5.1)
LYMPHOCYTES RELATIVE PERCENT: 7.2 %
MCH RBC QN AUTO: 25.6 PG (ref 26–34)
MCHC RBC AUTO-ENTMCNC: 33.3 G/DL (ref 31–36)
MCV RBC AUTO: 77 FL (ref 80–100)
MONOCYTES ABSOLUTE: 0.8 K/UL (ref 0–1.3)
MONOCYTES RELATIVE PERCENT: 9.1 %
NEUTROPHILS ABSOLUTE: 7.1 K/UL (ref 1.7–7.7)
NEUTROPHILS RELATIVE PERCENT: 82.9 %
PDW BLD-RTO: 17.5 % (ref 12.4–15.4)
PLATELET # BLD: 314 K/UL (ref 135–450)
PMV BLD AUTO: 7.8 FL (ref 5–10.5)
POTASSIUM REFLEX MAGNESIUM: 3.9 MMOL/L (ref 3.5–5.1)
RBC # BLD: 4.42 M/UL (ref 4–5.2)
SODIUM BLD-SCNC: 140 MMOL/L (ref 136–145)
WBC # BLD: 8.6 K/UL (ref 4–11)

## 2019-10-02 PROCEDURE — 2580000003 HC RX 258: Performed by: INTERNAL MEDICINE

## 2019-10-02 PROCEDURE — 80048 BASIC METABOLIC PNL TOTAL CA: CPT

## 2019-10-02 PROCEDURE — 6360000002 HC RX W HCPCS: Performed by: INTERNAL MEDICINE

## 2019-10-02 PROCEDURE — 85025 COMPLETE CBC W/AUTO DIFF WBC: CPT

## 2019-10-02 PROCEDURE — 6360000002 HC RX W HCPCS: Performed by: EMERGENCY MEDICINE

## 2019-10-02 PROCEDURE — 99255 IP/OBS CONSLTJ NEW/EST HI 80: CPT | Performed by: INTERNAL MEDICINE

## 2019-10-02 PROCEDURE — 2500000003 HC RX 250 WO HCPCS: Performed by: INTERNAL MEDICINE

## 2019-10-02 PROCEDURE — 6370000000 HC RX 637 (ALT 250 FOR IP): Performed by: STUDENT IN AN ORGANIZED HEALTH CARE EDUCATION/TRAINING PROGRAM

## 2019-10-02 PROCEDURE — 2580000003 HC RX 258: Performed by: EMERGENCY MEDICINE

## 2019-10-02 PROCEDURE — 6370000000 HC RX 637 (ALT 250 FOR IP): Performed by: INTERNAL MEDICINE

## 2019-10-02 PROCEDURE — 1200000000 HC SEMI PRIVATE

## 2019-10-02 PROCEDURE — 99231 SBSQ HOSP IP/OBS SF/LOW 25: CPT | Performed by: SURGERY

## 2019-10-02 RX ORDER — POTASSIUM CHLORIDE 750 MG/1
10 TABLET, EXTENDED RELEASE ORAL ONCE
Status: COMPLETED | OUTPATIENT
Start: 2019-10-02 | End: 2019-10-02

## 2019-10-02 RX ORDER — FAMOTIDINE 20 MG/1
20 TABLET, FILM COATED ORAL 2 TIMES DAILY
Status: DISCONTINUED | OUTPATIENT
Start: 2019-10-02 | End: 2019-10-03 | Stop reason: HOSPADM

## 2019-10-02 RX ADMIN — FAMOTIDINE 20 MG: 10 INJECTION, SOLUTION INTRAVENOUS at 09:10

## 2019-10-02 RX ADMIN — MORPHINE SULFATE 2 MG: 2 INJECTION, SOLUTION INTRAMUSCULAR; INTRAVENOUS at 00:45

## 2019-10-02 RX ADMIN — PREDNISONE 20 MG: 20 TABLET ORAL at 09:10

## 2019-10-02 RX ADMIN — MORPHINE SULFATE 2 MG: 2 INJECTION, SOLUTION INTRAMUSCULAR; INTRAVENOUS at 06:42

## 2019-10-02 RX ADMIN — MEROPENEM 1 G: 1 INJECTION, POWDER, FOR SOLUTION INTRAVENOUS at 04:57

## 2019-10-02 RX ADMIN — MEROPENEM 1 G: 1 INJECTION, POWDER, FOR SOLUTION INTRAVENOUS at 11:09

## 2019-10-02 RX ADMIN — POTASSIUM CHLORIDE 10 MEQ: 10 TABLET, EXTENDED RELEASE ORAL at 07:02

## 2019-10-02 RX ADMIN — MORPHINE SULFATE 2 MG: 2 INJECTION, SOLUTION INTRAMUSCULAR; INTRAVENOUS at 16:17

## 2019-10-02 RX ADMIN — MORPHINE SULFATE 2 MG: 2 INJECTION, SOLUTION INTRAMUSCULAR; INTRAVENOUS at 18:31

## 2019-10-02 RX ADMIN — FAMOTIDINE 20 MG: 20 TABLET, FILM COATED ORAL at 22:43

## 2019-10-02 RX ADMIN — MORPHINE SULFATE 2 MG: 2 INJECTION, SOLUTION INTRAMUSCULAR; INTRAVENOUS at 22:49

## 2019-10-02 RX ADMIN — Medication 10 ML: at 22:44

## 2019-10-02 RX ADMIN — Medication 10 ML: at 09:10

## 2019-10-02 RX ADMIN — MEROPENEM 1 G: 1 INJECTION, POWDER, FOR SOLUTION INTRAVENOUS at 18:17

## 2019-10-02 ASSESSMENT — PAIN DESCRIPTION - LOCATION
LOCATION: ABDOMEN
LOCATION: ABDOMEN

## 2019-10-02 ASSESSMENT — PAIN DESCRIPTION - ONSET
ONSET: GRADUAL
ONSET: GRADUAL

## 2019-10-02 ASSESSMENT — PAIN DESCRIPTION - PROGRESSION
CLINICAL_PROGRESSION: GRADUALLY WORSENING
CLINICAL_PROGRESSION: GRADUALLY WORSENING

## 2019-10-02 ASSESSMENT — PAIN SCALES - GENERAL
PAINLEVEL_OUTOF10: 8
PAINLEVEL_OUTOF10: 7
PAINLEVEL_OUTOF10: 6
PAINLEVEL_OUTOF10: 0
PAINLEVEL_OUTOF10: 9

## 2019-10-02 ASSESSMENT — PAIN DESCRIPTION - PAIN TYPE
TYPE: ACUTE PAIN
TYPE: ACUTE PAIN

## 2019-10-02 ASSESSMENT — PAIN DESCRIPTION - DESCRIPTORS
DESCRIPTORS: ACHING;CRAMPING
DESCRIPTORS: ACHING;CRAMPING

## 2019-10-02 ASSESSMENT — PAIN DESCRIPTION - FREQUENCY
FREQUENCY: INTERMITTENT
FREQUENCY: INTERMITTENT

## 2019-10-02 ASSESSMENT — PAIN DESCRIPTION - ORIENTATION
ORIENTATION: MID
ORIENTATION: MID

## 2019-10-03 VITALS
BODY MASS INDEX: 18.99 KG/M2 | HEIGHT: 65 IN | RESPIRATION RATE: 18 BRPM | OXYGEN SATURATION: 99 % | WEIGHT: 114 LBS | DIASTOLIC BLOOD PRESSURE: 69 MMHG | TEMPERATURE: 98.6 F | HEART RATE: 82 BPM | SYSTOLIC BLOOD PRESSURE: 102 MMHG

## 2019-10-03 LAB
ANION GAP SERPL CALCULATED.3IONS-SCNC: 5 MMOL/L (ref 3–16)
BASOPHILS ABSOLUTE: 0 K/UL (ref 0–0.2)
BASOPHILS RELATIVE PERCENT: 0.4 %
BUN BLDV-MCNC: 7 MG/DL (ref 7–20)
CALCIUM SERPL-MCNC: 8.5 MG/DL (ref 8.3–10.6)
CHLORIDE BLD-SCNC: 110 MMOL/L (ref 99–110)
CO2: 25 MMOL/L (ref 21–32)
CREAT SERPL-MCNC: <0.5 MG/DL (ref 0.6–1.1)
EOSINOPHILS ABSOLUTE: 0 K/UL (ref 0–0.6)
EOSINOPHILS RELATIVE PERCENT: 0.8 %
GFR AFRICAN AMERICAN: >60
GFR NON-AFRICAN AMERICAN: >60
GLUCOSE BLD-MCNC: 84 MG/DL (ref 70–99)
HCT VFR BLD CALC: 34.4 % (ref 36–48)
HEMOGLOBIN: 11.5 G/DL (ref 12–16)
LYMPHOCYTES ABSOLUTE: 0.8 K/UL (ref 1–5.1)
LYMPHOCYTES RELATIVE PERCENT: 17.4 %
MCH RBC QN AUTO: 25.9 PG (ref 26–34)
MCHC RBC AUTO-ENTMCNC: 33.5 G/DL (ref 31–36)
MCV RBC AUTO: 77.5 FL (ref 80–100)
MONOCYTES ABSOLUTE: 0.9 K/UL (ref 0–1.3)
MONOCYTES RELATIVE PERCENT: 18.1 %
NEUTROPHILS ABSOLUTE: 3.1 K/UL (ref 1.7–7.7)
NEUTROPHILS RELATIVE PERCENT: 63.3 %
PDW BLD-RTO: 17.7 % (ref 12.4–15.4)
PLATELET # BLD: 314 K/UL (ref 135–450)
PMV BLD AUTO: 7.6 FL (ref 5–10.5)
POTASSIUM REFLEX MAGNESIUM: 3.9 MMOL/L (ref 3.5–5.1)
RBC # BLD: 4.44 M/UL (ref 4–5.2)
SODIUM BLD-SCNC: 140 MMOL/L (ref 136–145)
WBC # BLD: 4.8 K/UL (ref 4–11)

## 2019-10-03 PROCEDURE — 6370000000 HC RX 637 (ALT 250 FOR IP): Performed by: INTERNAL MEDICINE

## 2019-10-03 PROCEDURE — 6360000002 HC RX W HCPCS: Performed by: STUDENT IN AN ORGANIZED HEALTH CARE EDUCATION/TRAINING PROGRAM

## 2019-10-03 PROCEDURE — 36415 COLL VENOUS BLD VENIPUNCTURE: CPT

## 2019-10-03 PROCEDURE — 2580000003 HC RX 258: Performed by: INTERNAL MEDICINE

## 2019-10-03 PROCEDURE — 99233 SBSQ HOSP IP/OBS HIGH 50: CPT | Performed by: INTERNAL MEDICINE

## 2019-10-03 PROCEDURE — 6360000002 HC RX W HCPCS: Performed by: EMERGENCY MEDICINE

## 2019-10-03 PROCEDURE — 2580000003 HC RX 258: Performed by: EMERGENCY MEDICINE

## 2019-10-03 PROCEDURE — 80048 BASIC METABOLIC PNL TOTAL CA: CPT

## 2019-10-03 PROCEDURE — 6360000002 HC RX W HCPCS: Performed by: INTERNAL MEDICINE

## 2019-10-03 PROCEDURE — 85025 COMPLETE CBC W/AUTO DIFF WBC: CPT

## 2019-10-03 RX ORDER — CIPROFLOXACIN 500 MG/1
500 TABLET, FILM COATED ORAL 2 TIMES DAILY
Qty: 20 TABLET | Refills: 0 | Status: SHIPPED | OUTPATIENT
Start: 2019-10-03 | End: 2019-10-13

## 2019-10-03 RX ORDER — METRONIDAZOLE 500 MG/1
500 TABLET ORAL 3 TIMES DAILY
Qty: 30 TABLET | Refills: 0 | Status: SHIPPED | OUTPATIENT
Start: 2019-10-03 | End: 2019-10-13

## 2019-10-03 RX ORDER — PREDNISONE 20 MG/1
20 TABLET ORAL DAILY
Qty: 20 TABLET | Refills: 0 | Status: SHIPPED | OUTPATIENT
Start: 2019-10-03 | End: 2019-10-23

## 2019-10-03 RX ADMIN — MORPHINE SULFATE 2 MG: 2 INJECTION, SOLUTION INTRAMUSCULAR; INTRAVENOUS at 08:25

## 2019-10-03 RX ADMIN — FAMOTIDINE 20 MG: 20 TABLET, FILM COATED ORAL at 08:25

## 2019-10-03 RX ADMIN — ENOXAPARIN SODIUM 30 MG: 30 INJECTION SUBCUTANEOUS at 08:25

## 2019-10-03 RX ADMIN — PREDNISONE 20 MG: 20 TABLET ORAL at 08:25

## 2019-10-03 RX ADMIN — Medication 10 ML: at 08:26

## 2019-10-03 RX ADMIN — MEROPENEM 1 G: 1 INJECTION, POWDER, FOR SOLUTION INTRAVENOUS at 03:44

## 2019-10-03 RX ADMIN — ACETAMINOPHEN 650 MG: 325 TABLET ORAL at 14:05

## 2019-10-03 ASSESSMENT — PAIN SCALES - GENERAL
PAINLEVEL_OUTOF10: 7
PAINLEVEL_OUTOF10: 3

## 2019-11-08 ENCOUNTER — HOSPITAL ENCOUNTER (EMERGENCY)
Age: 39
Discharge: HOME | End: 2019-11-08
Attending: EMERGENCY MEDICINE
Payer: MEDICAID

## 2019-11-08 ENCOUNTER — APPOINTMENT (OUTPATIENT)
Dept: CT IMAGING | Age: 39
End: 2019-11-08
Payer: MEDICAID

## 2019-11-08 VITALS
HEART RATE: 94 BPM | TEMPERATURE: 98.2 F | BODY MASS INDEX: 20.49 KG/M2 | OXYGEN SATURATION: 100 % | RESPIRATION RATE: 18 BRPM | DIASTOLIC BLOOD PRESSURE: 75 MMHG | WEIGHT: 123 LBS | SYSTOLIC BLOOD PRESSURE: 102 MMHG | HEIGHT: 65 IN

## 2019-11-08 DIAGNOSIS — K50.90 ACUTE CROHN'S DISEASE, WITHOUT COMPLICATIONS (HCC): ICD-10-CM

## 2019-11-08 DIAGNOSIS — R10.31 ABDOMINAL PAIN, RIGHT LOWER QUADRANT: Primary | ICD-10-CM

## 2019-11-08 LAB
A/G RATIO: 1.4 (ref 1.1–2.2)
ALBUMIN SERPL-MCNC: 4.1 G/DL (ref 3.4–5)
ALP BLD-CCNC: 42 U/L (ref 40–129)
ALT SERPL-CCNC: 7 U/L (ref 10–40)
ANION GAP SERPL CALCULATED.3IONS-SCNC: 11 MMOL/L (ref 3–16)
AST SERPL-CCNC: 19 U/L (ref 15–37)
BACTERIA: ABNORMAL /HPF
BASOPHILS ABSOLUTE: 0 K/UL (ref 0–0.2)
BASOPHILS RELATIVE PERCENT: 0.5 %
BILIRUB SERPL-MCNC: <0.2 MG/DL (ref 0–1)
BILIRUBIN URINE: NEGATIVE
BLOOD, URINE: ABNORMAL
BUN BLDV-MCNC: 9 MG/DL (ref 7–20)
C-REACTIVE PROTEIN: 4.7 MG/L (ref 0–5.1)
CALCIUM SERPL-MCNC: 8.6 MG/DL (ref 8.3–10.6)
CHLORIDE BLD-SCNC: 101 MMOL/L (ref 99–110)
CLARITY: ABNORMAL
CO2: 27 MMOL/L (ref 21–32)
COLOR: YELLOW
CREAT SERPL-MCNC: <0.5 MG/DL (ref 0.6–1.1)
EOSINOPHILS ABSOLUTE: 0.1 K/UL (ref 0–0.6)
EOSINOPHILS RELATIVE PERCENT: 1.1 %
EPITHELIAL CELLS, UA: ABNORMAL /HPF
GFR AFRICAN AMERICAN: >60
GFR NON-AFRICAN AMERICAN: >60
GLOBULIN: 3 G/DL
GLUCOSE BLD-MCNC: 75 MG/DL (ref 70–99)
GLUCOSE URINE: NEGATIVE MG/DL
HCG QUALITATIVE: NEGATIVE
HCT VFR BLD CALC: 34.2 % (ref 36–48)
HEMOGLOBIN: 11.3 G/DL (ref 12–16)
KETONES, URINE: NEGATIVE MG/DL
LEUKOCYTE ESTERASE, URINE: ABNORMAL
LIPASE: 29 U/L (ref 13–60)
LYMPHOCYTES ABSOLUTE: 1.1 K/UL (ref 1–5.1)
LYMPHOCYTES RELATIVE PERCENT: 11.6 %
MCH RBC QN AUTO: 24.1 PG (ref 26–34)
MCHC RBC AUTO-ENTMCNC: 32.9 G/DL (ref 31–36)
MCV RBC AUTO: 73.2 FL (ref 80–100)
MICROSCOPIC EXAMINATION: YES
MONOCYTES ABSOLUTE: 0.7 K/UL (ref 0–1.3)
MONOCYTES RELATIVE PERCENT: 7.5 %
MUCUS: ABNORMAL /LPF
NEUTROPHILS ABSOLUTE: 7.7 K/UL (ref 1.7–7.7)
NEUTROPHILS RELATIVE PERCENT: 79.3 %
NITRITE, URINE: NEGATIVE
PDW BLD-RTO: 18.1 % (ref 12.4–15.4)
PH UA: 6 (ref 5–8)
PLATELET # BLD: 463 K/UL (ref 135–450)
PMV BLD AUTO: 7.7 FL (ref 5–10.5)
POTASSIUM REFLEX MAGNESIUM: 3.8 MMOL/L (ref 3.5–5.1)
PROTEIN UA: ABNORMAL MG/DL
RBC # BLD: 4.67 M/UL (ref 4–5.2)
RBC UA: ABNORMAL /HPF (ref 0–2)
SEDIMENTATION RATE, ERYTHROCYTE: 21 MM/HR (ref 0–20)
SODIUM BLD-SCNC: 139 MMOL/L (ref 136–145)
SPECIFIC GRAVITY UA: >=1.03 (ref 1–1.03)
TOTAL PROTEIN: 7.1 G/DL (ref 6.4–8.2)
URINE TYPE: ABNORMAL
UROBILINOGEN, URINE: 0.2 E.U./DL
WBC # BLD: 9.7 K/UL (ref 4–11)
WBC UA: ABNORMAL /HPF (ref 0–5)

## 2019-11-08 PROCEDURE — 6360000002 HC RX W HCPCS: Performed by: EMERGENCY MEDICINE

## 2019-11-08 PROCEDURE — 6360000004 HC RX CONTRAST MEDICATION: Performed by: EMERGENCY MEDICINE

## 2019-11-08 PROCEDURE — 86140 C-REACTIVE PROTEIN: CPT

## 2019-11-08 PROCEDURE — 81001 URINALYSIS AUTO W/SCOPE: CPT

## 2019-11-08 PROCEDURE — 99284 EMERGENCY DEPT VISIT MOD MDM: CPT

## 2019-11-08 PROCEDURE — 80053 COMPREHEN METABOLIC PANEL: CPT

## 2019-11-08 PROCEDURE — 74177 CT ABD & PELVIS W/CONTRAST: CPT

## 2019-11-08 PROCEDURE — 83690 ASSAY OF LIPASE: CPT

## 2019-11-08 PROCEDURE — 96374 THER/PROPH/DIAG INJ IV PUSH: CPT

## 2019-11-08 PROCEDURE — 84703 CHORIONIC GONADOTROPIN ASSAY: CPT

## 2019-11-08 PROCEDURE — 85652 RBC SED RATE AUTOMATED: CPT

## 2019-11-08 PROCEDURE — 96375 TX/PRO/DX INJ NEW DRUG ADDON: CPT

## 2019-11-08 PROCEDURE — 2580000003 HC RX 258: Performed by: EMERGENCY MEDICINE

## 2019-11-08 PROCEDURE — 6370000000 HC RX 637 (ALT 250 FOR IP): Performed by: EMERGENCY MEDICINE

## 2019-11-08 PROCEDURE — 96361 HYDRATE IV INFUSION ADD-ON: CPT

## 2019-11-08 PROCEDURE — 85025 COMPLETE CBC W/AUTO DIFF WBC: CPT

## 2019-11-08 RX ORDER — METOCLOPRAMIDE HYDROCHLORIDE 5 MG/ML
10 INJECTION INTRAMUSCULAR; INTRAVENOUS ONCE
Status: COMPLETED | OUTPATIENT
Start: 2019-11-08 | End: 2019-11-08

## 2019-11-08 RX ORDER — ONDANSETRON 4 MG/1
4 TABLET, ORALLY DISINTEGRATING ORAL EVERY 8 HOURS PRN
Qty: 20 TABLET | Refills: 0 | Status: SHIPPED | OUTPATIENT
Start: 2019-11-08

## 2019-11-08 RX ORDER — MORPHINE SULFATE 4 MG/ML
4 INJECTION, SOLUTION INTRAMUSCULAR; INTRAVENOUS ONCE
Status: COMPLETED | OUTPATIENT
Start: 2019-11-08 | End: 2019-11-08

## 2019-11-08 RX ORDER — 0.9 % SODIUM CHLORIDE 0.9 %
1000 INTRAVENOUS SOLUTION INTRAVENOUS ONCE
Status: COMPLETED | OUTPATIENT
Start: 2019-11-08 | End: 2019-11-08

## 2019-11-08 RX ORDER — DIPHENHYDRAMINE HCL 25 MG
25 TABLET ORAL ONCE
Status: COMPLETED | OUTPATIENT
Start: 2019-11-08 | End: 2019-11-08

## 2019-11-08 RX ORDER — METOCLOPRAMIDE 10 MG/1
10 TABLET ORAL 2 TIMES DAILY PRN
Qty: 20 TABLET | Refills: 0 | Status: SHIPPED | OUTPATIENT
Start: 2019-11-08

## 2019-11-08 RX ORDER — PREDNISONE 20 MG/1
20 TABLET ORAL DAILY
Status: ON HOLD | COMMUNITY
End: 2020-05-08 | Stop reason: HOSPADM

## 2019-11-08 RX ADMIN — IOPAMIDOL 80 ML: 755 INJECTION, SOLUTION INTRAVENOUS at 15:03

## 2019-11-08 RX ADMIN — MORPHINE SULFATE 4 MG: 4 INJECTION INTRAVENOUS at 13:35

## 2019-11-08 RX ADMIN — SODIUM CHLORIDE 1000 ML: 9 INJECTION, SOLUTION INTRAVENOUS at 13:35

## 2019-11-08 RX ADMIN — METOCLOPRAMIDE 10 MG: 5 INJECTION, SOLUTION INTRAMUSCULAR; INTRAVENOUS at 13:36

## 2019-11-08 RX ADMIN — DIPHENHYDRAMINE HCL 25 MG: 25 TABLET ORAL at 14:14

## 2019-11-08 RX ADMIN — IOHEXOL 50 ML: 240 INJECTION, SOLUTION INTRATHECAL; INTRAVASCULAR; INTRAVENOUS; ORAL at 15:03

## 2019-11-08 ASSESSMENT — PAIN DESCRIPTION - FREQUENCY: FREQUENCY: CONTINUOUS

## 2019-11-08 ASSESSMENT — PAIN DESCRIPTION - LOCATION: LOCATION: ABDOMEN

## 2019-11-08 ASSESSMENT — PAIN DESCRIPTION - PAIN TYPE: TYPE: ACUTE PAIN

## 2019-11-08 ASSESSMENT — PAIN SCALES - GENERAL
PAINLEVEL_OUTOF10: 10
PAINLEVEL_OUTOF10: 10

## 2019-11-08 ASSESSMENT — PAIN DESCRIPTION - DESCRIPTORS: DESCRIPTORS: SHARP

## 2020-02-20 ENCOUNTER — APPOINTMENT (OUTPATIENT)
Dept: GENERAL RADIOLOGY | Age: 40
DRG: 245 | End: 2020-02-20
Payer: MEDICAID

## 2020-02-20 ENCOUNTER — HOSPITAL ENCOUNTER (INPATIENT)
Age: 40
LOS: 2 days | Discharge: HOME | DRG: 245 | End: 2020-02-22
Attending: EMERGENCY MEDICINE | Admitting: INTERNAL MEDICINE
Payer: MEDICAID

## 2020-02-20 ENCOUNTER — APPOINTMENT (OUTPATIENT)
Dept: CT IMAGING | Age: 40
DRG: 245 | End: 2020-02-20
Payer: MEDICAID

## 2020-02-20 PROBLEM — K50.919 EXACERBATION OF CROHN'S DISEASE WITH COMPLICATION (HCC): Status: ACTIVE | Noted: 2020-02-20

## 2020-02-20 LAB
ALBUMIN SERPL-MCNC: 4.2 G/DL (ref 3.4–5)
ALP BLD-CCNC: 43 U/L (ref 40–129)
ALT SERPL-CCNC: 11 U/L (ref 10–40)
ANION GAP SERPL CALCULATED.3IONS-SCNC: 13 MMOL/L (ref 3–16)
AST SERPL-CCNC: 17 U/L (ref 15–37)
BASOPHILS ABSOLUTE: 0.1 K/UL (ref 0–0.2)
BASOPHILS RELATIVE PERCENT: 0.6 %
BILIRUB SERPL-MCNC: 0.3 MG/DL (ref 0–1)
BILIRUBIN DIRECT: <0.2 MG/DL (ref 0–0.3)
BILIRUBIN URINE: NEGATIVE
BILIRUBIN, INDIRECT: NORMAL MG/DL (ref 0–1)
BLOOD, URINE: NEGATIVE
BUN BLDV-MCNC: 5 MG/DL (ref 7–20)
CALCIUM SERPL-MCNC: 9.1 MG/DL (ref 8.3–10.6)
CHLORIDE BLD-SCNC: 103 MMOL/L (ref 99–110)
CLARITY: CLEAR
CO2: 24 MMOL/L (ref 21–32)
COLOR: YELLOW
CREAT SERPL-MCNC: 0.5 MG/DL (ref 0.6–1.1)
EOSINOPHILS ABSOLUTE: 0.1 K/UL (ref 0–0.6)
EOSINOPHILS RELATIVE PERCENT: 1 %
GFR AFRICAN AMERICAN: >60
GFR NON-AFRICAN AMERICAN: >60
GLUCOSE BLD-MCNC: 112 MG/DL (ref 70–99)
GLUCOSE URINE: NEGATIVE MG/DL
HCG QUALITATIVE: NEGATIVE
HCT VFR BLD CALC: 32.1 % (ref 36–48)
HEMOGLOBIN: 10.5 G/DL (ref 12–16)
KETONES, URINE: NEGATIVE MG/DL
LEUKOCYTE ESTERASE, URINE: NEGATIVE
LIPASE: 16 U/L (ref 13–60)
LYMPHOCYTES ABSOLUTE: 0.3 K/UL (ref 1–5.1)
LYMPHOCYTES RELATIVE PERCENT: 2.4 %
MCH RBC QN AUTO: 20.6 PG (ref 26–34)
MCHC RBC AUTO-ENTMCNC: 32.8 G/DL (ref 31–36)
MCV RBC AUTO: 62.9 FL (ref 80–100)
MICROSCOPIC EXAMINATION: NORMAL
MONOCYTES ABSOLUTE: 1.5 K/UL (ref 0–1.3)
MONOCYTES RELATIVE PERCENT: 13.8 %
NEUTROPHILS ABSOLUTE: 9.1 K/UL (ref 1.7–7.7)
NEUTROPHILS RELATIVE PERCENT: 82.2 %
NITRITE, URINE: NEGATIVE
PDW BLD-RTO: 19.9 % (ref 12.4–15.4)
PH UA: 7.5 (ref 5–8)
PLATELET # BLD: 531 K/UL (ref 135–450)
PMV BLD AUTO: 7.7 FL (ref 5–10.5)
POTASSIUM SERPL-SCNC: 3.7 MMOL/L (ref 3.5–5.1)
PROTEIN UA: NEGATIVE MG/DL
RBC # BLD: 5.1 M/UL (ref 4–5.2)
SODIUM BLD-SCNC: 140 MMOL/L (ref 136–145)
SPECIFIC GRAVITY UA: 1.01 (ref 1–1.03)
TOTAL PROTEIN: 7.3 G/DL (ref 6.4–8.2)
URINE TYPE: NORMAL
UROBILINOGEN, URINE: 0.2 E.U./DL
WBC # BLD: 11.1 K/UL (ref 4–11)

## 2020-02-20 PROCEDURE — 74019 RADEX ABDOMEN 2 VIEWS: CPT

## 2020-02-20 PROCEDURE — 1200000000 HC SEMI PRIVATE

## 2020-02-20 PROCEDURE — 80076 HEPATIC FUNCTION PANEL: CPT

## 2020-02-20 PROCEDURE — 6360000002 HC RX W HCPCS: Performed by: EMERGENCY MEDICINE

## 2020-02-20 PROCEDURE — 99253 IP/OBS CNSLTJ NEW/EST LOW 45: CPT | Performed by: SURGERY

## 2020-02-20 PROCEDURE — 85025 COMPLETE CBC W/AUTO DIFF WBC: CPT

## 2020-02-20 PROCEDURE — 83690 ASSAY OF LIPASE: CPT

## 2020-02-20 PROCEDURE — 81003 URINALYSIS AUTO W/O SCOPE: CPT

## 2020-02-20 PROCEDURE — 84703 CHORIONIC GONADOTROPIN ASSAY: CPT

## 2020-02-20 PROCEDURE — 6360000002 HC RX W HCPCS: Performed by: INTERNAL MEDICINE

## 2020-02-20 PROCEDURE — 6360000004 HC RX CONTRAST MEDICATION: Performed by: EMERGENCY MEDICINE

## 2020-02-20 PROCEDURE — 2580000003 HC RX 258: Performed by: EMERGENCY MEDICINE

## 2020-02-20 PROCEDURE — 80048 BASIC METABOLIC PNL TOTAL CA: CPT

## 2020-02-20 PROCEDURE — 74177 CT ABD & PELVIS W/CONTRAST: CPT

## 2020-02-20 PROCEDURE — 96365 THER/PROPH/DIAG IV INF INIT: CPT

## 2020-02-20 PROCEDURE — 96375 TX/PRO/DX INJ NEW DRUG ADDON: CPT

## 2020-02-20 PROCEDURE — 99285 EMERGENCY DEPT VISIT HI MDM: CPT

## 2020-02-20 PROCEDURE — 2580000003 HC RX 258: Performed by: INTERNAL MEDICINE

## 2020-02-20 RX ORDER — PROMETHAZINE HYDROCHLORIDE 25 MG/1
12.5 TABLET ORAL EVERY 6 HOURS PRN
Status: DISCONTINUED | OUTPATIENT
Start: 2020-02-20 | End: 2020-02-22 | Stop reason: HOSPADM

## 2020-02-20 RX ORDER — MORPHINE SULFATE 4 MG/ML
4 INJECTION, SOLUTION INTRAMUSCULAR; INTRAVENOUS ONCE
Status: COMPLETED | OUTPATIENT
Start: 2020-02-20 | End: 2020-02-20

## 2020-02-20 RX ORDER — POLYETHYLENE GLYCOL 3350 17 G/17G
17 POWDER, FOR SOLUTION ORAL DAILY PRN
Status: DISCONTINUED | OUTPATIENT
Start: 2020-02-20 | End: 2020-02-22 | Stop reason: HOSPADM

## 2020-02-20 RX ORDER — SODIUM CHLORIDE, SODIUM LACTATE, POTASSIUM CHLORIDE, AND CALCIUM CHLORIDE .6; .31; .03; .02 G/100ML; G/100ML; G/100ML; G/100ML
1000 INJECTION, SOLUTION INTRAVENOUS ONCE
Status: COMPLETED | OUTPATIENT
Start: 2020-02-20 | End: 2020-02-20

## 2020-02-20 RX ORDER — DEXAMETHASONE SODIUM PHOSPHATE 4 MG/ML
10 INJECTION, SOLUTION INTRA-ARTICULAR; INTRALESIONAL; INTRAMUSCULAR; INTRAVENOUS; SOFT TISSUE ONCE
Status: DISCONTINUED | OUTPATIENT
Start: 2020-02-20 | End: 2020-02-20

## 2020-02-20 RX ORDER — MORPHINE SULFATE 2 MG/ML
2 INJECTION, SOLUTION INTRAMUSCULAR; INTRAVENOUS EVERY 4 HOURS PRN
Status: DISCONTINUED | OUTPATIENT
Start: 2020-02-20 | End: 2020-02-22 | Stop reason: HOSPADM

## 2020-02-20 RX ORDER — ACETAMINOPHEN 650 MG/1
650 SUPPOSITORY RECTAL EVERY 6 HOURS PRN
Status: DISCONTINUED | OUTPATIENT
Start: 2020-02-20 | End: 2020-02-20 | Stop reason: SDUPTHER

## 2020-02-20 RX ORDER — SODIUM CHLORIDE 9 MG/ML
INJECTION, SOLUTION INTRAVENOUS CONTINUOUS
Status: DISCONTINUED | OUTPATIENT
Start: 2020-02-20 | End: 2020-02-22 | Stop reason: HOSPADM

## 2020-02-20 RX ORDER — METHYLPREDNISOLONE SODIUM SUCCINATE 125 MG/2ML
60 INJECTION, POWDER, LYOPHILIZED, FOR SOLUTION INTRAMUSCULAR; INTRAVENOUS ONCE
Status: COMPLETED | OUTPATIENT
Start: 2020-02-20 | End: 2020-02-20

## 2020-02-20 RX ORDER — PROCHLORPERAZINE EDISYLATE 5 MG/ML
10 INJECTION INTRAMUSCULAR; INTRAVENOUS EVERY 6 HOURS PRN
Status: DISCONTINUED | OUTPATIENT
Start: 2020-02-20 | End: 2020-02-22 | Stop reason: HOSPADM

## 2020-02-20 RX ORDER — ACETAMINOPHEN 325 MG/1
650 TABLET ORAL EVERY 6 HOURS PRN
Status: DISCONTINUED | OUTPATIENT
Start: 2020-02-20 | End: 2020-02-22 | Stop reason: HOSPADM

## 2020-02-20 RX ORDER — SODIUM CHLORIDE 0.9 % (FLUSH) 0.9 %
10 SYRINGE (ML) INJECTION PRN
Status: DISCONTINUED | OUTPATIENT
Start: 2020-02-20 | End: 2020-02-22 | Stop reason: HOSPADM

## 2020-02-20 RX ORDER — POTASSIUM CHLORIDE 7.45 MG/ML
10 INJECTION INTRAVENOUS PRN
Status: DISCONTINUED | OUTPATIENT
Start: 2020-02-20 | End: 2020-02-22 | Stop reason: HOSPADM

## 2020-02-20 RX ORDER — ACETAMINOPHEN 325 MG/1
650 TABLET ORAL EVERY 6 HOURS PRN
Status: DISCONTINUED | OUTPATIENT
Start: 2020-02-20 | End: 2020-02-20 | Stop reason: SDUPTHER

## 2020-02-20 RX ORDER — SODIUM CHLORIDE 0.9 % (FLUSH) 0.9 %
10 SYRINGE (ML) INJECTION EVERY 12 HOURS SCHEDULED
Status: DISCONTINUED | OUTPATIENT
Start: 2020-02-20 | End: 2020-02-22 | Stop reason: HOSPADM

## 2020-02-20 RX ORDER — ACETAMINOPHEN 650 MG/1
650 SUPPOSITORY RECTAL EVERY 6 HOURS PRN
Status: DISCONTINUED | OUTPATIENT
Start: 2020-02-20 | End: 2020-02-22 | Stop reason: HOSPADM

## 2020-02-20 RX ORDER — DROPERIDOL 2.5 MG/ML
1.25 INJECTION, SOLUTION INTRAMUSCULAR; INTRAVENOUS ONCE
Status: COMPLETED | OUTPATIENT
Start: 2020-02-20 | End: 2020-02-20

## 2020-02-20 RX ORDER — MAGNESIUM SULFATE IN WATER 40 MG/ML
2 INJECTION, SOLUTION INTRAVENOUS PRN
Status: DISCONTINUED | OUTPATIENT
Start: 2020-02-20 | End: 2020-02-22 | Stop reason: HOSPADM

## 2020-02-20 RX ORDER — ONDANSETRON 2 MG/ML
4 INJECTION INTRAMUSCULAR; INTRAVENOUS EVERY 6 HOURS PRN
Status: DISCONTINUED | OUTPATIENT
Start: 2020-02-20 | End: 2020-02-22 | Stop reason: HOSPADM

## 2020-02-20 RX ORDER — KETOROLAC TROMETHAMINE 30 MG/ML
30 INJECTION, SOLUTION INTRAMUSCULAR; INTRAVENOUS EVERY 6 HOURS PRN
Status: DISCONTINUED | OUTPATIENT
Start: 2020-02-20 | End: 2020-02-22 | Stop reason: HOSPADM

## 2020-02-20 RX ADMIN — Medication 10 ML: at 20:52

## 2020-02-20 RX ADMIN — METHYLPREDNISOLONE SODIUM SUCCINATE 60 MG: 125 INJECTION, POWDER, FOR SOLUTION INTRAMUSCULAR; INTRAVENOUS at 04:00

## 2020-02-20 RX ADMIN — SODIUM CHLORIDE: 9 INJECTION, SOLUTION INTRAVENOUS at 04:38

## 2020-02-20 RX ADMIN — MORPHINE SULFATE 4 MG: 4 INJECTION INTRAVENOUS at 01:24

## 2020-02-20 RX ADMIN — ONDANSETRON 4 MG: 2 INJECTION INTRAMUSCULAR; INTRAVENOUS at 04:14

## 2020-02-20 RX ADMIN — KETOROLAC TROMETHAMINE 30 MG: 30 INJECTION, SOLUTION INTRAMUSCULAR at 04:14

## 2020-02-20 RX ADMIN — ONDANSETRON 4 MG: 2 INJECTION INTRAMUSCULAR; INTRAVENOUS at 21:41

## 2020-02-20 RX ADMIN — SODIUM CHLORIDE, POTASSIUM CHLORIDE, SODIUM LACTATE AND CALCIUM CHLORIDE 1000 ML: 600; 310; 30; 20 INJECTION, SOLUTION INTRAVENOUS at 01:24

## 2020-02-20 RX ADMIN — MORPHINE SULFATE 2 MG: 2 INJECTION, SOLUTION INTRAMUSCULAR; INTRAVENOUS at 12:33

## 2020-02-20 RX ADMIN — DROPERIDOL 1.25 MG: 2.5 INJECTION, SOLUTION INTRAMUSCULAR; INTRAVENOUS at 01:24

## 2020-02-20 RX ADMIN — MORPHINE SULFATE 2 MG: 2 INJECTION, SOLUTION INTRAMUSCULAR; INTRAVENOUS at 21:42

## 2020-02-20 RX ADMIN — IOPAMIDOL 80 ML: 755 INJECTION, SOLUTION INTRAVENOUS at 02:13

## 2020-02-20 RX ADMIN — KETOROLAC TROMETHAMINE 30 MG: 30 INJECTION, SOLUTION INTRAMUSCULAR at 17:16

## 2020-02-20 RX ADMIN — SODIUM CHLORIDE: 9 INJECTION, SOLUTION INTRAVENOUS at 16:26

## 2020-02-20 ASSESSMENT — PAIN DESCRIPTION - DESCRIPTORS
DESCRIPTORS: SHARP;SHOOTING
DESCRIPTORS: DISCOMFORT;SPASM
DESCRIPTORS: SHOOTING;SHARP
DESCRIPTORS: SHOOTING;SHARP
DESCRIPTORS: SHARP;SHOOTING

## 2020-02-20 ASSESSMENT — ENCOUNTER SYMPTOMS
COUGH: 1
VOMITING: 1
NAUSEA: 1
EYES NEGATIVE: 1
SHORTNESS OF BREATH: 0
ABDOMINAL PAIN: 1

## 2020-02-20 ASSESSMENT — PAIN SCALES - GENERAL
PAINLEVEL_OUTOF10: 8
PAINLEVEL_OUTOF10: 2
PAINLEVEL_OUTOF10: 3
PAINLEVEL_OUTOF10: 5
PAINLEVEL_OUTOF10: 6
PAINLEVEL_OUTOF10: 8
PAINLEVEL_OUTOF10: 8
PAINLEVEL_OUTOF10: 10

## 2020-02-20 ASSESSMENT — PAIN DESCRIPTION - PAIN TYPE
TYPE: ACUTE PAIN
TYPE: CHRONIC PAIN
TYPE: CHRONIC PAIN
TYPE: ACUTE PAIN
TYPE: ACUTE PAIN

## 2020-02-20 ASSESSMENT — PAIN DESCRIPTION - ONSET
ONSET: ON-GOING

## 2020-02-20 ASSESSMENT — PAIN DESCRIPTION - LOCATION
LOCATION: ABDOMEN

## 2020-02-20 ASSESSMENT — PAIN DESCRIPTION - PROGRESSION
CLINICAL_PROGRESSION: NOT CHANGED
CLINICAL_PROGRESSION: NOT CHANGED
CLINICAL_PROGRESSION: GRADUALLY WORSENING
CLINICAL_PROGRESSION: NOT CHANGED
CLINICAL_PROGRESSION: NOT CHANGED

## 2020-02-20 ASSESSMENT — PAIN DESCRIPTION - ORIENTATION
ORIENTATION: MID;LOWER

## 2020-02-20 ASSESSMENT — PAIN DESCRIPTION - FREQUENCY
FREQUENCY: CONTINUOUS
FREQUENCY: CONTINUOUS
FREQUENCY: INTERMITTENT
FREQUENCY: INTERMITTENT
FREQUENCY: CONTINUOUS

## 2020-02-20 ASSESSMENT — PAIN - FUNCTIONAL ASSESSMENT
PAIN_FUNCTIONAL_ASSESSMENT: ACTIVITIES ARE NOT PREVENTED

## 2020-02-20 NOTE — H&P
Allergies:  Bentyl [dicyclomine hcl]    Social History:    TOBACCO:   reports that she has quit smoking. She has never used smokeless tobacco.  ETOH:   reports previous alcohol use. E-Cigarettes Vaping or Juuling     Questions Responses    E-Cigarette Use     Start Date     Does device contain nicotine? Quit Date     E-Cigarette Type         Family History:    Reviewed in detail and negative for DM, CAD, Cancer, CVA. Positive as follows:        Problem Relation Age of Onset    Diabetes Mother     Diabetes Father     Diabetes Sister     Cancer Maternal Grandmother     Crohn's Disease Other        REVIEW OF SYSTEMS:   Pertinent positives as noted in the HPI. All other systems reviewed and negative. PHYSICAL EXAM PERFORMED:    BP (!) 96/55   Pulse 82   Temp 98.1 °F (36.7 °C) (Oral)   Resp 18   Ht 5' 5\" (1.651 m)   Wt 127 lb 8 oz (57.8 kg)   SpO2 99%   BMI 21.22 kg/m²     General appearance:  No apparent distress, appears stated age and cooperative. HEENT:  Normal cephalic, atraumatic without obvious deformity. Pupils equal, round, and reactive to light. Extra ocular muscles intact. Conjunctivae/corneas clear. Neck: Supple, with full range of motion. No jugular venous distention. Trachea midline. Respiratory:  Normal respiratory effort. Clear to auscultation, bilaterally without Rales/Wheezes/Rhonchi. Cardiovascular:  Regular rate and rhythm with normal S1/S2 without murmurs, rubs or gallops. Abdomen: Soft, diffuse tenderness with voluntary guarding but no rigidity or rebound, distended with hypoactive bowel sounds. Musculoskeletal:  No clubbing, cyanosis or edema bilaterally. Full range of motion without deformity. Skin: Skin color, texture, turgor normal.  No rashes or lesions. Neurologic:  Neurovascularly intact without any focal sensory/motor deficits.  Cranial nerves: II-XII intact, grossly non-focal.  Psychiatric:  Alert and oriented, thought content appropriate, normal primary care provider on file. for the opportunity to be involved in this patient's care. If you have any questions or concerns please feel free to contact me at [phone].

## 2020-02-20 NOTE — PROGRESS NOTES
pt returned from x-ray requesting diet, and asked  if no food can we do at least ice chips or popsicle  perfect serve sent to DR RUFF [facility]

## 2020-02-20 NOTE — ED PROVIDER NOTES
Previous Medications    METOCLOPRAMIDE (REGLAN) 10 MG TABLET    Take 1 tablet by mouth 2 times daily as needed (cramps) WARNING:  May cause drowsiness. May impair ability to operate vehicles or machinery. Do not use in combination with alcohol. OMEPRAZOLE (PRILOSEC) 20 MG DELAYED RELEASE CAPSULE    Take 20 mg by mouth daily    ONDANSETRON (ZOFRAN ODT) 4 MG DISINTEGRATING TABLET    Take 1 tablet by mouth every 8 hours as needed for Nausea    PREDNISONE (DELTASONE) 20 MG TABLET    Take 20 mg by mouth daily       Allergies     She is allergic to bentyl [dicyclomine hcl]. Physical Exam     INITIAL VITALS: BP: 91/64, Temp: 98.1 °F (36.7 °C), Pulse: 83, Resp: 22, SpO2: 100 %   Physical Exam  Vitals signs and nursing note reviewed. Constitutional:       General: She is not in acute distress. Comments: Thin, appears uncomfortable. HENT:      Head: Normocephalic and atraumatic. Mouth/Throat:      Mouth: Mucous membranes are moist.      Pharynx: No oropharyngeal exudate. Eyes:      General: No scleral icterus. Extraocular Movements: Extraocular movements intact. Conjunctiva/sclera: Conjunctivae normal.      Pupils: Pupils are equal, round, and reactive to light. Neck:      Musculoskeletal: Normal range of motion and neck supple. Cardiovascular:      Rate and Rhythm: Normal rate and regular rhythm. Heart sounds: Normal heart sounds. Pulmonary:      Effort: Pulmonary effort is normal.      Breath sounds: Normal breath sounds. No wheezing, rhonchi or rales. Abdominal:      General: Bowel sounds are normal.      Palpations: Abdomen is soft. Tenderness: There is generalized abdominal tenderness. There is guarding. There is no rebound. Comments: Diffuse abdominal tenderness with voluntary guarding present. Musculoskeletal: Normal range of motion. General: No swelling. Skin:     General: Skin is warm and dry.    Neurological:      General: No focal deficit present. Mental Status: She is alert and oriented to person, place, and time. Cranial Nerves: No cranial nerve deficit. Motor: No weakness. Coordination: Coordination normal.         Diagnostic Results     RADIOLOGY:  CT ABDOMEN PELVIS W IV CONTRAST Additional Contrast? None   Final Result       Developing small bowel obstruction with transition point in the terminal    ileum which appears thickened and inflamed. Likely represents terminal    ileitis consistent with Crohn's disease. No perforation or abscess.           LABS:   Results for orders placed or performed during the hospital encounter of 02/20/20   CBC auto differential   Result Value Ref Range    WBC 11.1 (H) 4.0 - 11.0 K/uL    RBC 5.10 4.00 - 5.20 M/uL    Hemoglobin 10.5 (L) 12.0 - 16.0 g/dL    Hematocrit 32.1 (L) 36.0 - 48.0 %    MCV 62.9 (L) 80.0 - 100.0 fL    MCH 20.6 (L) 26.0 - 34.0 pg    MCHC 32.8 31.0 - 36.0 g/dL    RDW 19.9 (H) 12.4 - 15.4 %    Platelets 720 (H) 977 - 450 K/uL    MPV 7.7 5.0 - 10.5 fL    Neutrophils % 82.2 %    Lymphocytes % 2.4 %    Monocytes % 13.8 %    Eosinophils % 1.0 %    Basophils % 0.6 %    Neutrophils Absolute 9.1 (H) 1.7 - 7.7 K/uL    Lymphocytes Absolute 0.3 (L) 1.0 - 5.1 K/uL    Monocytes Absolute 1.5 (H) 0.0 - 1.3 K/uL    Eosinophils Absolute 0.1 0.0 - 0.6 K/uL    Basophils Absolute 0.1 0.0 - 0.2 K/uL   Basic Metabolic Panel (EP - 1)   Result Value Ref Range    Sodium 140 136 - 145 mmol/L    Potassium 3.7 3.5 - 5.1 mmol/L    Chloride 103 99 - 110 mmol/L    CO2 24 21 - 32 mmol/L    Anion Gap 13 3 - 16    Glucose 112 (H) 70 - 99 mg/dL    BUN 5 (L) 7 - 20 mg/dL    CREATININE 0.5 (L) 0.6 - 1.1 mg/dL    GFR Non-African American >60 >60    GFR African American >60 >60    Calcium 9.1 8.3 - 10.6 mg/dL   Hepatic function panel (LFTs)   Result Value Ref Range    Total Protein 7.3 6.4 - 8.2 g/dL    Alb 4.2 3.4 - 5.0 g/dL    Alkaline Phosphatase 43 40 - 129 U/L    ALT 11 10 - 40 U/L    AST 17 15 - 37 U/L Total Bilirubin 0.3 0.0 - 1.0 mg/dL    Bilirubin, Direct <0.2 0.0 - 0.3 mg/dL    Bilirubin, Indirect see below 0.0 - 1.0 mg/dL   Lipase   Result Value Ref Range    Lipase 16.0 13.0 - 60.0 U/L   HCG Qualitative, Serum   Result Value Ref Range    hCG Qual Negative Detects HCG level >10 MIU/mL     RECENT VITALS:  BP: (!) 96/55,Temp: 98.1 °F (36.7 °C), Pulse: 82, Resp: 18, SpO2: 99 %     Procedures     N/A    ED Course     Nursing Notes, Past Medical Hx, Past Surgical Hx, Social Hx,Allergies, and Family Hx were reviewed. patient was given the following medications:  Orders Placed This Encounter   Medications    morphine injection 4 mg    droperidol (INAPSINE) injection 1.25 mg    lactated ringers bolus    iopamidol (ISOVUE-370) 76 % injection 80 mL       CONSULTS:  IP CONSULT TO HOSPITALIST  IP CONSULT TO GI    MEDICAL DECISIONMAKING / ASSESSMENT / Jordanalejandro Jimenez is a 35 y.o. female with history of Crohn's disease who was maintained on prednisone but not currently on disease modifying medications presents complaining of abdominal pain, nausea, and vomiting. Patient states this feels similar to Crohn's flare. On exam, she does have diffuse abdominal tenderness with voluntary guarding. Laboratory studies are significant for a white blood cell count of 11.1. Renal panel is unremarkable. CT scan of the abdomen and pelvis does show inflammation of the terminal ileum with dilating small bowel proximal to this concerning for evolving bowel obstruction. Patient was given droperidol and morphine with improvement of her symptoms and had no active vomiting while in the emergency department. Calls been placed to GI to discuss medication management, specifically steroid dosing. Patient will be admitted to the hospitalist service for further evaluation. Clinical Impression     1.  Crohn's disease of small intestine with intestinal obstruction (HCC)        Disposition     PATIENT REFERRED TO:  No follow-up provider specified.     DISCHARGE MEDICATIONS:  New Prescriptions    No medications on file       DISPOSITION Decision To Admit 02/20/2020 03:08:42 AM        Elli Hernandez MD  02/20/20 0532

## 2020-02-20 NOTE — CONSULTS
Resident Consult Note  General Surgery     Reason for Consult: 'History of Crohn's disease, presenting with abdominal pain and vomiting, CT consistent with bowel obstruction'    Chief Complaint: Abdominal pain, nausea and vomiting     History of Present Illness: :   Gene Tobin is a 35year old female with history of Crohn's presents to the ED with increasing abdominal pain and nausea for 3 day duration. Patient has been hospitalized multiple times in the past for similar symptoms. She reports that she has not been adherent to the steroids with which she has been prescribed by her Gastroenterologist for about 1 month. Patient has had similar episodes in the past after medication noncompliance. She states that she has had abdominal pain for the past few days associated with nausea and multiple episodes of non-bloody non-bilious emesis. She reports no fevers and no chills. Patient states that she last passed a BM last night piror to presentation and reports that she has been having regular BMs. A CT performed in the ED, however, was read as being consistent with a developing small bowel obstruction with transition point in the terminal ileum which appeared thickened and inflamed, for which Colorectal Surgery was consulted. Past Medical History:        Diagnosis Date    Cro[facility] disease ([facility] [address]     Tuberculosis      Past Surgical History:        Procedure Laterality Date    LAPAROSCOPY       Allergies:  Bentyl [dicyclomine hcl]    Medications:   Home Meds  No current facility-administered medications on file prior to encounter. Current Outpatient Medications on File Prior to Encounter   Medication Sig Dispense Refill    metoclopramide (REGLAN) 10 MG tablet Take 1 tablet by mouth 2 times daily as needed (cramps) WARNING:  May cause drowsiness. May impair ability to operate vehicles or machinery. Do not use in combination with alcohol.  20 tablet 0    ondansetron (ZOFRAN ODT) 4 MG disintegrating tablet Take 1 tablet by mouth every 8 hours as needed for Nausea 20 tablet 0    omeprazole (PRILOSEC) 20 MG delayed release capsule Take 20 mg by mouth daily      predniSONE (DELTASONE) 20 MG tablet Take 20 mg by mouth daily       Current Meds  sodium chloride flush 0.9 % injection 10 mL, 2 times per day  sodium chloride flush 0.9 % injection 10 mL, PRN  polyethylene glycol (GLYCOLAX) packet 17 g, Daily PRN  promethazine (PHENERGAN) tablet 12.5 mg, Q6H PRN  ondansetron (ZOFRAN) injection 4 mg, Q6H PRN  enoxaparin (LOVENOX) injection 40 mg, Daily  potassium chloride 10 mEq/100 mL IVPB (Peripheral Line), PRN  magnesium sulfate 2 g in 50 mL IVPB premix, PRN  0.9 % sodium chloride infusion, Continuous  ketorolac (TORADOL) injection 30 mg, Q6H PRN  morphine (PF) injection 2 mg, Q4H PRN  prochlorperazine (COMPAZINE) injection 10 mg, Q6H PRN  acetaminophen (TYLENOL) tablet 650 mg, Q6H PRN    Or  acetaminophen (TYLENOL) suppository 650 mg, Q6H PRN      Family History:   Family History   Problem Relation Age of Onset    Diabetes Mother     Diabetes Father     Diabetes Sister     Cancer Maternal Grandmother     Crohn's Disease Other      Social History:   TOBACCO:   reports that she has quit smoking. She has never used smokeless tobacco.  ETOH:   reports previous alcohol use. DRUGS:   reports current drug use. Frequency: 7.00 times per week. Drug: Marijuana. Review of Systems:     GEN: reviewed and negative except as noted in HPI. GI: reviewed and negative except as noted in HPI. No hematemesis, + pain and vomiting    A 14 point complete review of systems was conducted and was negative except as noted in HPI       A 14 point review of systems was conducted, significant findings as noted in HPI. All other systems negative.     Physical Exam:    Vitals:    02/20/20 0105 02/20/20 0300 02/20/20 0414 02/20/20 0921   BP: 91/64 (!) 96/55 (!) 101/49 (!) 93/51   Pulse: 83 82 80 77   Resp: 22 18 18 20   Temp: 98.1 °F (36.7 °C) 98.6 °F (37 °C) 98.3 °F (36.8 °C)   TempSrc: Oral  Oral Oral   SpO2: 100% 99% 99% 99%   Weight: 127 lb 8 oz (57.8 kg)      Height: 5' 5\" (1.651 m)        General Appearance: Alert; no acute distress  EYE: PERRL; no scleral icterus;  NECK: Normocephalic/atraumatic;  trachea midline; airway patent   Chest/Lungs: Normal effort; clear to auscultation bilaterally; no crackles, no wheezes, no rhonchi  Cardiovascular: Regular rate and rhythm; normotensive at lower limit of normal   Abdomen: Non-distended; soft; minimally-tender RLQ; normoactive bowel sounds; no guarding, no rigidity, no rebound  Skin: Warm and dry; no exanthems  Extremities: No edema; no cyanosis  Neuro: A&Ox3; no focal deficits; sensation intact    Laboratory Results:    CBC:   Recent Labs     02/20/20 0119   WBC 11.1*   HGB 10.5*   HCT 32.1*   MCV 62.9*   *     BMP:   Recent Labs     02/20/20 0119      K 3.7      CO2 24   BUN 5*   CREATININE 0.5*     PT/INR: No results for input(s): PROTIME, INR in the last 72 hours. APTT: No results for input(s): APTT in the last 72 hours. Liver Profile:  Lab Results   Component Value Date    AST 17 02/20/2020    ALT 11 02/20/2020    BILIDIR <0.2 02/20/2020    BILITOT 0.3 02/20/2020    ALKPHOS 43 02/20/2020   No results found for: CHOL, HDL, TRIG  UA:   Lab Results   Component Value Date    COLORU Yellow 02/20/2020    PHUR 7.5 02/20/2020    WBCUA 20-50 11/08/2019    RBCUA 3-5 11/08/2019    MUCUS 2+ 11/08/2019    BACTERIA 2+ 11/08/2019    CLARITYU Clear 02/20/2020    SPECGRAV 1.010 02/20/2020    LEUKOCYTESUR Negative 02/20/2020    UROBILINOGEN 0.2 02/20/2020    BILIRUBINUR Negative 02/20/2020    BLOODU Negative 02/20/2020    GLUCOSEU Negative 02/20/2020     Imaging:   CT ABDOMEN PELVIS W IV CONTRAST Additional Contrast? None   Final Result       Developing small bowel obstruction with transition point in the terminal    ileum which appears thickened and inflamed.   Likely represents terminal ileitis consistent with Crohn's disease. No perforation or abscess. XR ABDOMEN (2 VIEWS)    (Results Pending)     Assessment/Plan: This is a 35 y.o. female with p SBO secondary to terminal ileitis    - Will give gastrograffin and get AXR  - Will have GI see patient     Discussed with Senior resident and Dr. Zulma Bullock MD, MPH  PGY-1 General Surgery  02/20/20  10:00 AM  [phone]    I performed a history and physical examination of the patient and discussed management with the resident. I reviewed the resident's note and agree with the documented findings and plan of care. Imaging reviewed: contrast progresses to colon. Labs reviewed. Minimal WBC elevation. Patient states she feels better. Abdomen is soft.      Katiuska Bobby M.D.  2/20/20   7:21 PM

## 2020-02-20 NOTE — PLAN OF CARE
Problem: Pain:  Goal: Pain level will decrease  Description  Pain level will decrease  2/20/2020 1719 by Bobo Nath RN  Note:   Patient complains of pain at level 6/10. Patient describes pain as sharp ABD  Patient requests pain medication. Patient medicated with toradol per PRN Orders  Will continue to monitor. Problem: Nausea/Vomiting:  Goal: Absence of nausea/vomiting  Description  Absence of nausea/vomiting  2/20/2020 1719 by Bobo Nath RN  Note:   Pt denies n/v at this time eating a clear liquid diet. will continue to monitor .

## 2020-02-20 NOTE — PROGRESS NOTES
4 Eyes Admission Assessment     I agree as the admission nurse that 2 RN's have performed a thorough Head to Toe Skin Assessment on the patient. ALL assessment sites listed below have been assessed on admission. Areas assessed by both nurses: YES  [x]   Head, Face, and Ears   [x]   Shoulders, Back, and Chest  [x]   Arms, Elbows, and Hands   [x]   Coccyx, Sacrum, and Ischum  [x]   Legs, Feet, and Heels        Does the Patient have Skin Breakdown?   No         Arpit Prevention initiated:  No   Wound Care Orders initiated:  No      [facility] nurse consulted for Pressure Injury (Stage 3,4, Unstageable, DTI, NWPT, and Complex wounds):  No      Nurse 1 eSignature: Electronically signed by Maris Coles RN on 2/20/20 at 4:48 AM    **SHARE this note so that the co-signing nurse is able to place an eSignature**    Nurse 2 eSignature: Electronically signed by Jaycee Danielle RN on 2/20/20 at 4:52 AM

## 2020-02-20 NOTE — CONSULTS
[address] [place] [address]  GI Consultation      Patient: Patricia Valentin  : 1980       Date:  2020    Subjective:       History of Present Illness  Patient is a 35 y.o.  female admitted with Exacerbation of Crohn's disease with complication ([facility] [address] [X20.055]  Exacerbation of Crohn's disease with complication ([facility] [address] [A76.810] who is seen in consult for same. Several days of RLQ pain N/V. No F/C hematemesis, rectal bleeding, melena. Has not set up [address]. Several recent flares, small abscess last time. Past Medical History:   Diagnosis Date    Crohn's disease ([facility] [address]     Tuberculosis       Past Surgical History:   Procedure Laterality Date    LAPAROSCOPY          Admission Meds  No current facility-administered medications on file prior to encounter. Current Outpatient Medications on File Prior to Encounter   Medication Sig Dispense Refill    metoclopramide (REGLAN) 10 MG tablet Take 1 tablet by mouth 2 times daily as needed (cramps) WARNING:  May cause drowsiness. May impair ability to operate vehicles or machinery. Do not use in combination with alcohol.  20 tablet 0    ondansetron (ZOFRAN ODT) 4 MG disintegrating tablet Take 1 tablet by mouth every 8 hours as needed for Nausea 20 tablet 0    omeprazole (PRILOSEC) 20 MG delayed release capsule Take 20 mg by mouth daily      predniSONE (DELTASONE) 20 MG tablet Take 20 mg by mouth daily           Allergies  Allergies   Allergen Reactions    Bentyl [Dicyclomine Hcl]       Social   Social History     Tobacco Use    Smoking status: Former Smoker    Smokeless tobacco: Never Used    Tobacco comment: Smoked for only 6 months 20-30's   Substance Use Topics    Alcohol use: Not Currently        Family History   Problem Relation Age of Onset    Diabetes Mother     Diabetes Father     Diabetes Sister     Cancer Maternal Grandmother     Crohn's Disease Other       Review of Systems  A comprehensive review of systems was negative except for: Constitutional: positive for fatigue       Physical Exam    BP (!) 101/49   Pulse 80   Temp 98.6 °F (37 °C) (Oral)   Resp 18   Ht 5' 5\" (1.651 m)   Wt 127 lb 8 oz (57.8 kg)   SpO2 99%   BMI 21.22 kg/m²   General appearance: alert, cooperative, no distress, appears stated age  Anicteric, No Jaundice  Head: Normocephalic, without obvious abnormality  Lungs: clear to auscultation bilaterally, Normal Effort  Heart: regular rate and rhythm, normal S1 and S2, no murmurs or rubs  Abdomen: soft mild/mod RLQ tend no G/R/M  Extremities: atraumatic, no cyanosis or edema  Skin: warm and dry  Neuro: intact  AAOX3      Data Review:    Recent Labs     02/20/20 0119   WBC 11.1*   HGB 10.5*   HCT 32.1*   MCV 62.9*   *     Recent Labs     02/20/20 0119      K 3.7      CO2 24   BUN 5*   CREATININE 0.5*     Recent Labs     02/20/20 0119   AST 17   ALT 11   BILIDIR <0.2   BILITOT 0.3   ALKPHOS 43     Recent Labs     02/20/20 0119   LIPASE 16.0                 CT-scan of abdomen and pelvis:   CT ABDOMEN PELVIS W IV CONTRAST Additional Contrast? None [[id number]]    Resulted: 02/20/20 0255    Updated: 02/20/20 0255    Narrative:       Patient: Jinny Padilla  Time Out: 02:55  Exam(s): CT ABDOMEN + PELVIS With Contrast      EXAM:    CT Abdomen and Pelvis With Intravenous Contrast     CLINICAL HISTORY:     Reason for exam: abdominal pain, hx Crohn's disease with abscess. Reason for exam:->abdominal pain, hx Crohn's disease with abscess.    Additional Contrast?->None.     TECHNIQUE:    Axial computed tomography images of the abdomen and pelvis with   intravenous contrast.  CTDI is 4 mGy and DLP is 197 mGy-cm.  All CT scans   at this facility use dose modulation, iterative reconstruction, and/or   weight based dosing when appropriate to reduce radiation dose to as low   as reasonably achievable.     COMPARISON:    11/8/2019     FINDINGS:    Lung bases:  No mass.  No

## 2020-02-20 NOTE — CARE COORDINATION
Case Management Assessment           Initial Evaluation                Date / Time of Evaluation: 2/20/2020 10:37 AM                 Assessment Completed by: Nancy Molina    Patient Name: Erickson Mcdonnell     YOB: 1980  Diagnosis: Exacerbation of Crohn's disease with complication ([facility] [address] [A72.744]  Exacerbation of Crohn's disease with complication ([facility] [address] [L26.788]     Date / Time: 2/20/2020 12:45 AM    Patient Admission Status: Inpatient    If patient is discharged prior to next notation, then this note serves as note for discharge by case management. Current PCP: No primary care provider on file. Clinic Patient: No    Chart Reviewed: Yes  Patient/ Family Interviewed: Yes    Initial assessment completed at bedside with: patient    Hospitalization in the last 30 days: No    Emergency Contacts:  Extended Emergency Contact Information  Primary Emergency Contact: [address] Phone: [phone]  Relation: Child   needed? No    Advance Directives:   Code Status: Full [address]: No    Copy present: No     In paper Chart: No    Scanned into EMR No    Financial  Payor: [place] MEDICAID / Plan: Kaushik Cortez / Product Type: *No Product type* /     Pre-cert required for SNF: Yes    Pharmacy    [address] [phone] [place] [phone]  [place] [address] [address] [address]  Phone: [phone] Fax: [phone]      Potential assistance Purchasing Medications: Potential Assistance Purchasing Medications: No  Does Patient want to participate in local refill/ meds to beds program?: No    Meds To Beds General Rules:  1. Can ONLY be done Monday- Friday between 8:30am-5pm  2. Prescription(s) must be in pharmacy by 3pm to be filled same day  3. Copy of patient's insurance/ prescription drug card and patient face sheet must be sent along with the prescription(s)  4.  Cost of Rx cannot be Patient and/or patient representative Heather Gruber and her family were provided with a choice of provider and agrees with the discharge plan Not Indicated    Freedom of choice list was provided with basic dialogue that supports the patient's individualized plan of care/goals and shares the quality data associated with the providers.  Not Indicated      Care Transition patient: No Brand Landau, RN  [facility] [employer]  Case Management Department  Ph: [phone]

## 2020-02-20 NOTE — PROGRESS NOTES
Patient is a 43-year-old female presented to hospital for small bowel obstruction secondary to terminal ileitis from Crohn's disease. Patient seen and evaluated at the bedside. Patient is being followed by GI and general surgery. Reviewed H&P, labs, agree with assessment and plan. Advance diet as tolerated.   Please review same date H&P for further recommendations

## 2020-02-20 NOTE — PLAN OF CARE
Problem: Pain:  Goal: Pain level will decrease  Description  Pain level will decrease  Outcome: Ongoing  Note:   Pt c/o 10/10 abd pain. Adminisited toradol and offered ice/ heat packs. Pt declined. Pt now resting in bed quietly. Will continue to assess. Problem: Falls - Risk of:  Goal: Will remain free from falls  Description  Will remain free from falls  Note:   Patient at risk for falls. Patient resting quietly in bed. Side rails up x 2. Bed locked in lowest position. Bed alarm on. Bedside table and call light within reach. Patient instructed to call for assistance. Patient verbalized understanding. No attempts to get up on own. Will continue to monitor. Problem: Nausea/Vomiting:  Goal: Absence of nausea/vomiting  Description  Absence of nausea/vomiting  Outcome: Ongoing  Note:   Pt c/o nausea, no episodes of vomiting. Pt given zofran. Pt NPO. IV fluids infusing at 100/hr. Will continue to assess.

## 2020-02-21 LAB
ANION GAP SERPL CALCULATED.3IONS-SCNC: 11 MMOL/L (ref 3–16)
ANISOCYTOSIS: ABNORMAL
BASOPHILS ABSOLUTE: 0 K/UL (ref 0–0.2)
BASOPHILS RELATIVE PERCENT: 0 %
BUN BLDV-MCNC: 5 MG/DL (ref 7–20)
CALCIUM SERPL-MCNC: 7.8 MG/DL (ref 8.3–10.6)
CHLORIDE BLD-SCNC: 105 MMOL/L (ref 99–110)
CO2: 21 MMOL/L (ref 21–32)
CREAT SERPL-MCNC: <0.5 MG/DL (ref 0.6–1.1)
EOSINOPHILS ABSOLUTE: 0 K/UL (ref 0–0.6)
EOSINOPHILS RELATIVE PERCENT: 0 %
FERRITIN: 25 NG/ML (ref 15–150)
GFR AFRICAN AMERICAN: >60
GFR NON-AFRICAN AMERICAN: >60
GLUCOSE BLD-MCNC: 87 MG/DL (ref 70–99)
HCT VFR BLD CALC: 26.7 % (ref 36–48)
HEMOGLOBIN: 8.8 G/DL (ref 12–16)
IRON SATURATION: 12 % (ref 15–50)
IRON: 31 UG/DL (ref 37–145)
LYMPHOCYTES ABSOLUTE: 0.6 K/UL (ref 1–5.1)
LYMPHOCYTES RELATIVE PERCENT: 12 %
MCH RBC QN AUTO: 20.9 PG (ref 26–34)
MCHC RBC AUTO-ENTMCNC: 32.8 G/DL (ref 31–36)
MCV RBC AUTO: 63.8 FL (ref 80–100)
MICROCYTES: ABNORMAL
MONOCYTES ABSOLUTE: 0.6 K/UL (ref 0–1.3)
MONOCYTES RELATIVE PERCENT: 14 %
NEUTROPHILS ABSOLUTE: 3.4 K/UL (ref 1.7–7.7)
NEUTROPHILS RELATIVE PERCENT: 74 %
OVALOCYTES: ABNORMAL
PDW BLD-RTO: 19.8 % (ref 12.4–15.4)
PLATELET # BLD: 455 K/UL (ref 135–450)
PMV BLD AUTO: 7.8 FL (ref 5–10.5)
POLYCHROMASIA: ABNORMAL
POTASSIUM REFLEX MAGNESIUM: 3.6 MMOL/L (ref 3.5–5.1)
RBC # BLD: 4.18 M/UL (ref 4–5.2)
SODIUM BLD-SCNC: 137 MMOL/L (ref 136–145)
TOTAL IRON BINDING CAPACITY: 268 UG/DL (ref 260–445)
WBC # BLD: 4.6 K/UL (ref 4–11)

## 2020-02-21 PROCEDURE — 85025 COMPLETE CBC W/AUTO DIFF WBC: CPT

## 2020-02-21 PROCEDURE — 36415 COLL VENOUS BLD VENIPUNCTURE: CPT

## 2020-02-21 PROCEDURE — 2580000003 HC RX 258: Performed by: INTERNAL MEDICINE

## 2020-02-21 PROCEDURE — 80048 BASIC METABOLIC PNL TOTAL CA: CPT

## 2020-02-21 PROCEDURE — 6370000000 HC RX 637 (ALT 250 FOR IP): Performed by: INTERNAL MEDICINE

## 2020-02-21 PROCEDURE — 99231 SBSQ HOSP IP/OBS SF/LOW 25: CPT | Performed by: SURGERY

## 2020-02-21 PROCEDURE — 6360000002 HC RX W HCPCS: Performed by: INTERNAL MEDICINE

## 2020-02-21 PROCEDURE — 83550 IRON BINDING TEST: CPT

## 2020-02-21 PROCEDURE — 82728 ASSAY OF FERRITIN: CPT

## 2020-02-21 PROCEDURE — 1200000000 HC SEMI PRIVATE

## 2020-02-21 PROCEDURE — 83540 ASSAY OF IRON: CPT

## 2020-02-21 RX ORDER — METHYLPREDNISOLONE SODIUM SUCCINATE 40 MG/ML
40 INJECTION, POWDER, LYOPHILIZED, FOR SOLUTION INTRAMUSCULAR; INTRAVENOUS DAILY
Status: DISCONTINUED | OUTPATIENT
Start: 2020-02-21 | End: 2020-02-22 | Stop reason: HOSPADM

## 2020-02-21 RX ADMIN — MORPHINE SULFATE 2 MG: 2 INJECTION, SOLUTION INTRAMUSCULAR; INTRAVENOUS at 01:40

## 2020-02-21 RX ADMIN — METHYLPREDNISOLONE SODIUM SUCCINATE 40 MG: 40 INJECTION, POWDER, FOR SOLUTION INTRAMUSCULAR; INTRAVENOUS at 08:36

## 2020-02-21 RX ADMIN — Medication 10 ML: at 20:03

## 2020-02-21 RX ADMIN — KETOROLAC TROMETHAMINE 30 MG: 30 INJECTION, SOLUTION INTRAMUSCULAR at 15:33

## 2020-02-21 RX ADMIN — MORPHINE SULFATE 2 MG: 2 INJECTION, SOLUTION INTRAMUSCULAR; INTRAVENOUS at 06:31

## 2020-02-21 RX ADMIN — SODIUM CHLORIDE: 9 INJECTION, SOLUTION INTRAVENOUS at 06:17

## 2020-02-21 RX ADMIN — DICLOFENAC 2 G: 10 GEL TOPICAL at 13:34

## 2020-02-21 RX ADMIN — KETOROLAC TROMETHAMINE 30 MG: 30 INJECTION, SOLUTION INTRAMUSCULAR at 20:03

## 2020-02-21 RX ADMIN — KETOROLAC TROMETHAMINE 30 MG: 30 INJECTION, SOLUTION INTRAMUSCULAR at 08:26

## 2020-02-21 RX ADMIN — SODIUM CHLORIDE: 9 INJECTION, SOLUTION INTRAVENOUS at 17:20

## 2020-02-21 ASSESSMENT — PAIN DESCRIPTION - LOCATION
LOCATION: ABDOMEN;NECK
LOCATION: ABDOMEN

## 2020-02-21 ASSESSMENT — PAIN DESCRIPTION - PROGRESSION
CLINICAL_PROGRESSION: NOT CHANGED

## 2020-02-21 ASSESSMENT — PAIN SCALES - GENERAL
PAINLEVEL_OUTOF10: 6
PAINLEVEL_OUTOF10: 9
PAINLEVEL_OUTOF10: 7
PAINLEVEL_OUTOF10: 9
PAINLEVEL_OUTOF10: 7
PAINLEVEL_OUTOF10: 6
PAINLEVEL_OUTOF10: 9

## 2020-02-21 ASSESSMENT — PAIN DESCRIPTION - FREQUENCY
FREQUENCY: CONTINUOUS
FREQUENCY: CONTINUOUS

## 2020-02-21 ASSESSMENT — PAIN DESCRIPTION - PAIN TYPE
TYPE: ACUTE PAIN
TYPE: ACUTE PAIN

## 2020-02-21 ASSESSMENT — PAIN DESCRIPTION - ONSET
ONSET: ON-GOING
ONSET: ON-GOING

## 2020-02-21 ASSESSMENT — PAIN DESCRIPTION - DESCRIPTORS
DESCRIPTORS: SHARP
DESCRIPTORS: SHARP

## 2020-02-21 ASSESSMENT — PAIN DESCRIPTION - ORIENTATION
ORIENTATION: MID;LOWER
ORIENTATION: LOWER;MID

## 2020-02-21 NOTE — PLAN OF CARE
Nutrition Problem: Inadequate oral intake  Intervention: Food and/or Nutrient Delivery: Continue current diet  Nutritional Goals: Patient will tolerate diet advancement and consume 50% or greater of all meals

## 2020-02-21 NOTE — PROGRESS NOTES
Colorectal Surgery Daily Progress Note    CC: Terminal ileitis    SUBJECTIVE:  No acute events overnight. Tolerating a CLD. Had a bowel movement. ROS:   A 14 point review of systems was conducted, significant findings as noted above. All other systems negative.     OBJECTIVE:    PHYSICAL EXAM:  Vitals:    02/20/20 0921 02/20/20 1214 02/20/20 1705 02/21/20 0058   BP: (!) 93/51 116/62 132/74 131/75   Pulse: 77 78 75 76   Resp: 20 18 20 18   Temp: 98.3 °F (36.8 °C) 98.5 °F (36.9 °C) 98.2 °F (36.8 °C) 98 °F (36.7 °C)   TempSrc: Oral Oral Oral Oral   SpO2: 99% 100% 99% 99%   Weight:       Height:           General Appearance: Alert; no acute distress  EYE: PERRL; no scleral icterus;  NECK: Normocephalic/atraumatic;  trachea midline; airway patent   Chest/Lungs: Normal effort; clear to auscultation bilaterally; no crackles, no wheezes, no rhonchi  Cardiovascular: Regular rate and rhythm; normotensive at lower limit of normal   Abdomen: Non-distended; soft; minimally-tender RLQ; normoactive bowel sounds; no guarding, no rigidity, no rebound  Skin: Warm and dry; no exanthems  Extremities: No edema; no cyanosis  Neuro: A&Ox3; no focal deficits; sensation intact      ASSESSMENT & PLAN:   This is a 35 y.o. female with a diagnosis of terminal ileitis    - continue CLD, ok to advance diet as tolerated  - continue IS and deep breathing  - continue OOB and ambulation  - continue medical management per primary  - need steroids per GI     Debbie Odell DO, PGY-1  02/21/20  6:20 AM  [phone]

## 2020-02-21 NOTE — PROGRESS NOTES
NUTRITION ASSESSMENT  Admission Date: 2/20/2020     Type and Reason for Visit: Initial, Positive Nutrition Screen    NUTRITION RECOMMENDATIONS:   1. PO Diet: Continue current diet of full liquids, monitor tolerance and ability to advance    NUTRITION ASSESSMENT:  Positive screen for poor po and wt loss. Patient with hx of crohns admitted with N/V however po now improved. Per EMR wt has been stable ~3 months. Will continue to monitor nutritional status and ability to advance diet. MALNUTRITION ASSESSMENT  Context: Acute illness or injury   Malnutrition Status:  At risk for malnutrition  Findings of the 6 clinical characteristics of malnutrition (Minimum of 2 out of 6 clinical characteristics is required to make the diagnosis of moderate or severe Protein Calorie Malnutrition based on AND/ASPEN Guidelines):   Energy Intake %: Less than or equal to 75% of estimated energy requirement   Energy Intake Time: (x2 days pta)   Interpretation of Weight Loss %: No significant weight loss   Interpretation of Weight Loss Time: in 3 months   Body Fat Status: Unable to assess(Pt declined NFPA)   Muscle Mass Status: Unable to assess   Fluid Accumulation Status: No significant fluid accumulation   Reduced  Strength: Not measured    NUTRITION DIAGNOSIS  Problem: Inadequate Oral Intake  Etiology: Acute or chronic injury or trauma  Signs & Symptoms: Intake 0-25%    NUTRITION INTERVENTION  Food and/or Nutrient Delivery:Continue Current diet   Nutrition education/counseling/coordination of care: Continue Inpatient Monitoring     NUTRITION MONITORING & EVALUATION:  Evaluation:Goals set   Goals: Patient will tolerate diet advancement and consume 50% or greater of all meals  Monitoring: Diet Tolerance , GI Function  or Meal Intake      OBJECTIVE DATA:  · Nutrition-Focused Physical Findings: +BM 2/21  · Wounds None      Past Medical History:   Diagnosis Date    Crohn's disease ([facility] [address]     Tuberculosis ANTHROPOMETRICS  Current Height: 5' 5\" (165.1 cm)  Current Weight: 127 lb 8 oz (57.8 kg)    Admission weight: 127 lb 8 oz (57.8 kg)  Ideal Bodyweight 125 lb       BMI BMI (Calculated): 21.3    Wt Readings from Last 50 Encounters:   02/20/20 127 lb 8 oz (57.8 kg)   11/08/19 123 lb (55.8 kg)   09/30/19 114 lb (51.7 kg)   09/11/19 113 lb (51.3 kg)   03/24/19 120 lb (54.4 kg)       COMPARATIVE STANDARDS  Estimated Total Kcals/Day : 25-30  Current Bodyweight (57 kg) 5782-2198 kcal    Estimated Total Protein (g/day) : 1.0-1.2 Current Bodyweight (57 kg) 57-68 g/day  Estimated Daily Total Fluid (ml/day): 1710 mL per day     Food / Nutrition-Related History  Pre-Admission / Home Diet:  Pre-Admission/Home Diet: General   Home Supplements / Herbals:    none noted  Food Restrictions / Cultural Requests:    none noted    Diet Orders / Intake / Nutrition Support  Current diet/supplement order: DIET FULL LIQUID;     NSG Recorded PO:   PO Fluids P.O.: 360 mL  PO Meals PO Meals Eaten (%): 26 - 50%   PO Intake: 26-50% and 51-75%    NUTRITION RISK LEVEL: Risk Level:  Moderate    [address], LD  [place]:  [phone]  Office:  [phone]

## 2020-02-21 NOTE — PROGRESS NOTES
[facility]  GI Progress Note        Jeannett Phalen is a 35 y.o. female patient. 1. Crohn's disease of small intestine with intestinal obstruction ([address]        Admit Date: 2020    Subjective:       Improved pain less passing PO contrast no N/V    Scheduled Meds:   sodium chloride flush  10 mL Intravenous 2 times per day    enoxaparin  40 mg Subcutaneous Daily       Continuous Infusions:   sodium chloride 100 mL/hr at 20 0617       PRN Meds:  sodium chloride flush, polyethylene glycol, promethazine, ondansetron, potassium chloride, magnesium sulfate, ketorolac, morphine, prochlorperazine, acetaminophen **OR** acetaminophen      Objective:       Patient Vitals for the past 24 hrs:   BP Temp Temp src Pulse Resp SpO2   20 0430 135/77 98 °F (36.7 °C) Oral 75 18 99 %   20 0058 131/75 98 °F (36.7 °C) Oral 76 18 99 %   20 1705 132/74 98.2 °F (36.8 °C) Oral 75 20 99 %   20 1214 116/62 98.5 °F (36.9 °C) Oral 78 18 100 %   20 0921 (!) 93/51 98.3 °F (36.8 °C) Oral 77 20 99 %       Exam:  VITALS:  /77   Pulse 75   Temp 98 °F (36.7 °C) (Oral)   Resp 18   Ht 5' 5\" (1.651 m)   Wt 127 lb 8 oz (57.8 kg)   SpO2 99%   BMI 21.22 kg/m²   TEMPERATURE:  Current - Temp: 98 °F (36.7 °C); Max - Temp  Av.2 °F (36.8 °C)  Min: 98 °F (36.7 °C)  Max: 98.5 °F (36.9 °C)    NAD  General appearance: alert, appears stated age, cooperative and no distress  Abdomen: soft ND mild RLQ tend no G/R/M  AAOx3, No asterixis or encephalopathy  Extremities: No edema.       Recent Labs     20  0119 20  0652   WBC 11.1* 4.6   HGB 10.5* 8.8*   HCT 32.1* 26.7*   MCV 62.9* 63.8*   * 455*     Recent Labs     20  01120  0652    137   K 3.7 3.6    105   CO2 24 21   BUN 5* 5*   CREATININE 0.5* <0.5*     Recent Labs     20   AST 17   ALT 11   BILIDIR <0.2   BILITOT 0.3   ALKPHOS 43     Recent Labs     20   LIPASE 16.0

## 2020-02-22 VITALS
DIASTOLIC BLOOD PRESSURE: 82 MMHG | BODY MASS INDEX: 21.24 KG/M2 | RESPIRATION RATE: 18 BRPM | HEIGHT: 65 IN | OXYGEN SATURATION: 100 % | SYSTOLIC BLOOD PRESSURE: 132 MMHG | HEART RATE: 74 BPM | TEMPERATURE: 97.1 F | WEIGHT: 127.5 LBS

## 2020-02-22 LAB
ALBUMIN SERPL-MCNC: 3.2 G/DL (ref 3.4–5)
ANION GAP SERPL CALCULATED.3IONS-SCNC: 10 MMOL/L (ref 3–16)
ANISOCYTOSIS: ABNORMAL
BASOPHILS ABSOLUTE: 0 K/UL (ref 0–0.2)
BASOPHILS RELATIVE PERCENT: 0 %
BUN BLDV-MCNC: 3 MG/DL (ref 7–20)
CALCIUM SERPL-MCNC: 8.2 MG/DL (ref 8.3–10.6)
CHLORIDE BLD-SCNC: 109 MMOL/L (ref 99–110)
CO2: 22 MMOL/L (ref 21–32)
CREAT SERPL-MCNC: <0.5 MG/DL (ref 0.6–1.1)
EOSINOPHILS ABSOLUTE: 0 K/UL (ref 0–0.6)
EOSINOPHILS RELATIVE PERCENT: 0 %
GFR AFRICAN AMERICAN: >60
GFR NON-AFRICAN AMERICAN: >60
GLUCOSE BLD-MCNC: 84 MG/DL (ref 70–99)
HCT VFR BLD CALC: 25.9 % (ref 36–48)
HEMOGLOBIN: 8.3 G/DL (ref 12–16)
HYPOCHROMIA: ABNORMAL
LYMPHOCYTES ABSOLUTE: 1.1 K/UL (ref 1–5.1)
LYMPHOCYTES RELATIVE PERCENT: 22 %
MAGNESIUM: 2 MG/DL (ref 1.8–2.4)
MCH RBC QN AUTO: 20.4 PG (ref 26–34)
MCHC RBC AUTO-ENTMCNC: 32.2 G/DL (ref 31–36)
MCV RBC AUTO: 63.6 FL (ref 80–100)
MICROCYTES: ABNORMAL
MONOCYTES ABSOLUTE: 0.6 K/UL (ref 0–1.3)
MONOCYTES RELATIVE PERCENT: 11 %
NEUTROPHILS ABSOLUTE: 3.5 K/UL (ref 1.7–7.7)
NEUTROPHILS RELATIVE PERCENT: 67 %
PDW BLD-RTO: 19.7 % (ref 12.4–15.4)
PHOSPHORUS: 2.3 MG/DL (ref 2.5–4.9)
PLATELET # BLD: 428 K/UL (ref 135–450)
PMV BLD AUTO: 7.6 FL (ref 5–10.5)
POLYCHROMASIA: ABNORMAL
POTASSIUM SERPL-SCNC: 3.7 MMOL/L (ref 3.5–5.1)
RBC # BLD: 4.07 M/UL (ref 4–5.2)
SODIUM BLD-SCNC: 141 MMOL/L (ref 136–145)
WBC # BLD: 5.2 K/UL (ref 4–11)

## 2020-02-22 PROCEDURE — 85025 COMPLETE CBC W/AUTO DIFF WBC: CPT

## 2020-02-22 PROCEDURE — 36415 COLL VENOUS BLD VENIPUNCTURE: CPT

## 2020-02-22 PROCEDURE — 6360000002 HC RX W HCPCS: Performed by: INTERNAL MEDICINE

## 2020-02-22 PROCEDURE — 83735 ASSAY OF MAGNESIUM: CPT

## 2020-02-22 PROCEDURE — 99232 SBSQ HOSP IP/OBS MODERATE 35: CPT | Performed by: SURGERY

## 2020-02-22 PROCEDURE — 80069 RENAL FUNCTION PANEL: CPT

## 2020-02-22 PROCEDURE — 6370000000 HC RX 637 (ALT 250 FOR IP): Performed by: STUDENT IN AN ORGANIZED HEALTH CARE EDUCATION/TRAINING PROGRAM

## 2020-02-22 RX ADMIN — METHYLPREDNISOLONE SODIUM SUCCINATE 40 MG: 40 INJECTION, POWDER, FOR SOLUTION INTRAMUSCULAR; INTRAVENOUS at 09:30

## 2020-02-22 RX ADMIN — MORPHINE SULFATE 2 MG: 2 INJECTION, SOLUTION INTRAMUSCULAR; INTRAVENOUS at 11:38

## 2020-02-22 RX ADMIN — KETOROLAC TROMETHAMINE 30 MG: 30 INJECTION, SOLUTION INTRAMUSCULAR at 07:29

## 2020-02-22 RX ADMIN — DIBASIC SODIUM PHOSPHATE, MONOBASIC POTASSIUM PHOSPHATE AND MONOBASIC SODIUM PHOSPHATE 2 TABLET: 852; 155; 130 TABLET ORAL at 14:18

## 2020-02-22 RX ADMIN — KETOROLAC TROMETHAMINE 30 MG: 30 INJECTION, SOLUTION INTRAMUSCULAR at 14:13

## 2020-02-22 RX ADMIN — DIBASIC SODIUM PHOSPHATE, MONOBASIC POTASSIUM PHOSPHATE AND MONOBASIC SODIUM PHOSPHATE 2 TABLET: 852; 155; 130 TABLET ORAL at 09:30

## 2020-02-22 ASSESSMENT — PAIN DESCRIPTION - PROGRESSION
CLINICAL_PROGRESSION: NOT CHANGED

## 2020-02-22 ASSESSMENT — PAIN SCALES - GENERAL
PAINLEVEL_OUTOF10: 6
PAINLEVEL_OUTOF10: 0
PAINLEVEL_OUTOF10: 0
PAINLEVEL_OUTOF10: 7
PAINLEVEL_OUTOF10: 7

## 2020-02-22 NOTE — PROGRESS NOTES
CO2 24 21 22   PHOS  --   --  2.3*   BUN 5* 5* 3*   CREATININE 0.5* <0.5* <0.5*     Recent Labs     02/20/20  0119   AST 17   ALT 11   BILIDIR <0.2   BILITOT 0.3   ALKPHOS 43       Assessment:       Active Problems:    Exacerbation of Crohn's disease with complication (HCC)    Crohn's disease of small intestine with intestinal obstruction (HCC)  Resolved Problems:    * No resolved hospital problems.  *    Doing better, discussed that steroids have many side effects and are only a short term solution needs to FU for biologic meds    Recommendations:       Prednisone 40 mg a day  Follow up with Dr Brianne cerda to start Stelera  Low residue diet  Avoid NSAIDS  OK to DC if tolerates low residue diet  Consider a dose of IV iron prior to DC, some of her microcytosis is like iron deficiency but also has HgC trait    Gus Tolliver  2/22/2020  8:12 AM

## 2020-02-22 NOTE — PROGRESS NOTES
Hospitalist Progress Note      PCP: No primary care provider on file. Date of Admission: 2/20/2020      Subjective:   denies chest pain, nausea, vomiting, shortness of breath, fever or chills. mention feels overall better    Medications:  Reviewed    Infusion Medications    sodium chloride 100 mL/hr at 02/21/20 1720     Scheduled Medications    methylPREDNISolone  40 mg Intravenous Daily    sodium chloride flush  10 mL Intravenous 2 times per day    enoxaparin  40 mg Subcutaneous Daily     PRN Meds: diclofenac sodium, sodium chloride flush, polyethylene glycol, promethazine, ondansetron, potassium chloride, magnesium sulfate, ketorolac, morphine, prochlorperazine, acetaminophen **OR** acetaminophen      Intake/Output Summary (Last 24 hours) at 2/21/2020 2208  Last data filed at 2/21/2020 2109  Gross per 24 hour   Intake 3568.67 ml   Output 550 ml   Net 3018.67 ml       Physical Exam Performed:    /79   Pulse 74   Temp 98.2 °F (36.8 °C) (Oral)   Resp 16   Ht 5' 5\" (1.651 m)   Wt 127 lb 8 oz (57.8 kg)   SpO2 100%   BMI 21.22 kg/m²     General appearance: No apparent distress,   HEENT:  Conjunctivae/corneas clear. Neck: Supple, with full range of motion. Respiratory:  Normal respiratory effort. Clear to auscultation, bilaterally without Rales/Wheezes/Rhonchi. Cardiovascular: Regular rate and rhythm with normal S1/S2 without murmurs, rubs or gallops. Abdomen: Soft, non-tender, non-distended, normal bowel sounds. Musculoskeletal: No cyanosis or edema bilaterally  Neurologic:  without any focal sensory/motor deficits.  grossly non-focal.  Psychiatric: Alert and oriented, Normal mood  Peripheral Pulses: +2 palpable, equal bilaterally       Labs:   Recent Labs     02/20/20  0119 02/21/20  0652   WBC 11.1* 4.6   HGB 10.5* 8.8*   HCT 32.1* 26.7*   * 455*     Recent Labs     02/20/20  0119 02/21/20  0652    137   K 3.7 3.6    105   CO2 24 21   BUN 5* 5*   CREATININE 0.5* <0.5* CALCIUM 9.1 7.8*     Recent Labs     02/20/20  0119   AST 17   ALT 11   BILIDIR <0.2   BILITOT 0.3   ALKPHOS 43     No results for input(s): INR in the last 72 hours. No results for input(s): Brian Prayer in the last 72 hours. Urinalysis:      Lab Results   Component Value Date    NITRU Negative 02/20/2020    WBCUA 20-50 11/08/2019    BACTERIA 2+ 11/08/2019    RBCUA 3-5 11/08/2019    BLOODU Negative 02/20/2020    SPECGRAV 1.010 02/20/2020    GLUCOSEU Negative 02/20/2020       Radiology:  XR ABDOMEN (2 VIEWS)   Final Result      Contrast has progressed the proximal descending colon with mild dilatation of small bowel possibly related to a partial small bowel obstruction      CT ABDOMEN PELVIS W IV CONTRAST Additional Contrast? None   Final Result       Developing small bowel obstruction with transition point in the terminal    ileum which appears thickened and inflamed. Likely represents terminal    ileitis consistent with Crohn's disease. No perforation or abscess.               Assessment/Plan:    Active Hospital Problems    Diagnosis    Exacerbation of Crohn's disease with complication ([facility] [D08.958]    Crohn's disease of small intestine with intestinal obstruction ([facility] [K50.012]      Exacerbation of Crohn's disease with complication ([facility] [O72.802] 02/20/2020   Developing small bowel obstruction  History of Crohn's disease with frequent flares     PLAN:  Start FLD, GI following - appreciate recs, Continue IV hydration  Pain relief PRN  Supportive therapy  Monitor electrolytes     DVT Prophylaxis: Lovenox  Diet: FLD  Code Status: Full Code     PT/OT Eval Status: As tolerated      Teodoro Roberts MD

## 2020-02-22 NOTE — CARE COORDINATION
Case Management Assessment            Discharge Note                    Date / Time of Note: 2/22/2020 4:35 PM                  Discharge Note Completed by: Judith Diaz    Patient Name: Isaura Cedillo   YOB: 1980  Diagnosis: Exacerbation of Crohn's disease with complication ([facility] [address] [K05.097]  Exacerbation of Crohn's disease with complication ([facility] [facility] [W51.354]   Date / Time: 2/20/2020 12:45 AM    Current PCP: No primary care provider on file. Clinic patient: No    Hospitalization in the last 30 days: No    Advance Directives:  Code Status: Full Code  [place] DNR form completed and on chart: Not Indicated    Financial:  Payor: Mary Gomez / Plan: Wilfred Downs / Product Type: *No Product type* /      Pharmacy:    [address], [address] [place] [address]. [address] [id number]  Phone: [phone] Fax: [phone]      Assistance purchasing medications?: Potential Assistance Purchasing Medications: No  Assistance provided by Case Management: None at this time    Does patient want to participate in local refill/ meds to beds program?: No    Meds To Beds General Rules:  1. Can ONLY be done Monday- Friday between 8:30am-5pm  2. Prescription(s) must be in pharmacy by 3pm to be filled same day  3. Copy of patient's insurance/ prescription drug card and patient face sheet must be sent along with the prescription(s)  4. Cost of Rx cannot be added to hospital bill. If financial assistance is needed, please contact unit  or ;  or  CANNOT provide pharmacy voucher for patients co-pays  5.  Patients can then  the prescription on their way out of the hospital at discharge, or pharmacy can deliver to the bedside if staff is available. (payment due at time of pick-up or delivery - cash, check, or card accepted)     Able to afford home medications/ co-pay costs: Yes    ADLS:  Current PT AM-PAC Score:   /24  Current OT AM-PAC Score:   /24      DISCHARGE Disposition: Home- No Services Needed    LOC at discharge: Not Applicable  CHELSEY Completed: Not Indicated    Notification completed in HENS/PAS?:  Not Applicable    IMM Completed:   Not Indicated    Transportation:  Transportation PLAN for discharge: family   Mode of Transport: Private Car  Reason for medical transport: Not Applicable  Name of Transport Company: Not Applicable  Time of Transport: TBD    Transport form completed: Not Indicated    Home Care:  [address] ordered at discharge: Not [address]: Not Applicable  Orders faxed: No    Durable Medical Equipment:  DME Provider:   Equipment obtained during hospitalization:     Home Oxygen and Respiratory Equipment:  Oxygen needed at discharge?: Not [address]: Not Applicable  Portable tank available for discharge?: Not Indicated    Dialysis:  Dialysis patient: No    Dialysis Center:  Not Applicable    Hospice Services:  Location: Not Applicable  Agency: Not Applicable    Consents signed: Not Indicated    Referrals made at [facility] for outpatient continued care:  Not Applicable    Additional CM Notes:   Patient is from home. No CM needs at this time. The Plan for Transition of Care is related to the following treatment goals of Exacerbation of Crohn's disease with complication ([facility] [address] [G06.966]  Exacerb[facility] of [facility]'s disease with complication [facility] [P67.395]    The Patient and/or patient representative Arik Minor and her family were provided with a choice of provider and agrees with the discharge plan Yes    Freedom of choice list was provided with basic dialogue that supports the patient's individualized plan of care/goals and shares the quality data associated with the providers.  Yes    Care Transitions patient: No    Lucy Amin RN  [facility]  Case Management Department  Ph: [phone]  Fax: [phone]

## 2020-02-25 NOTE — DISCHARGE SUMMARY
Hospital Medicine Discharge Summary    Patient ID: Won Greene      Patient's PCP: No primary care provider on file. Admit Date: 2/20/2020     Discharge Date: 2/22/2020     Admitting Physician: Tio Price MD     Discharge Physician: Augusta Damon MD     Discharge Diagnoses: Active Hospital Problems    Diagnosis Date Noted    Exacerbation of Crohn's disease with complication [facility]) [Z38.037] 02/20/2020    Crohn's disease of small intestine with intestinal obstruction ([facility] [address] [K50.012]        The patient was seen and examined on day of discharge and this discharge summary is in conjunction with any daily progress note from day of discharge. Hospital Course:     35 y.o. female who presents with complaints of abdominal pain for the past 2 to 3 days with associated nausea and vomiting-nonbloody nonbilious. Patient has history of Crohn's disease diagnosed in 2015 and has had multiple hospitalizations related to complications. Most recent admission was 4 months ago when she presented with abdominal pain and found to have a intra-abdominal abscess. Patient was treated with meropenem and opted for conservative management. Patient denies any fever, chills or diarrhea. Passing flatus. Has not been taking her prednisone for the past month due to unclear reasons. Patient follows up with GI service and has been noncompliant with her medications in the past.  Patient seen and evaluated by gastroenterology. Patient advanced to regular diet. Patient tolerated well. Patient advised to follow-up with GI as outpatient. Patient and partner at the bedside agrees with the discharge planning. Patient will be discharged in stable condition. Medical reconciliation performed and reviewed.   Patient will be discharged home in a stable condition    Exam:     /82   Pulse 74   Temp 97.1 °F (36.2 °C) (Oral)   Resp 18   Ht 5' 5\" (1.651 m)   Wt 127 lb 8 oz (57.8 kg)   SpO2 100%   BMI 21.22 kg/m²     General appearance: No apparent distress  HEENT:  Conjunctivae/corneas clear. Neck: Supple, with full range of motion. No jugular venous distention. Trachea midline. Respiratory:  Normal respiratory effort. Clear to auscultation, bilaterally without Rales/Wheezes/Rhonchi. Cardiovascular: Regular rate and rhythm with normal S1/S2 without murmurs, rubs or gallops. Abdomen: Soft, non-tender, non-distended, normal bowel sounds. Musculoskelatal: No clubbing, cyanosis or edema bilaterally. Full range of motion without deformity. Skin: Skin color, texture, turgor normal.  No rashes or lesions. Neurologic: no focal neurologic deficits. Cranial nerves: II-XII intact, grossly non-focal.  Psychiatric: Alert and oriented, normal mood    Consults:     IP CONSULT TO HOSPITALIST  IP CONSULT TO GI  IP CONSULT TO GENERAL SURGERY      Code Status:  Prior    Activity: activity as tolerated    Labs: For convenience and continuity at follow-up the following most recent labs are provided:      CBC:    Lab Results   Component Value Date    WBC 5.2 02/22/2020    HGB 8.3 02/22/2020    HCT 25.9 02/22/2020     02/22/2020       Renal:    Lab Results   Component Value Date     02/22/2020    K 3.7 02/22/2020    K 3.6 02/21/2020     02/22/2020    CO2 22 02/22/2020    BUN 3 02/22/2020    CREATININE <0.5 02/22/2020    CALCIUM 8.2 02/22/2020    PHOS 2.3 02/22/2020       Discharge Medications:     Discharge Medication List as of 2/22/2020  3:40 PM           Details   metoclopramide (REGLAN) 10 MG tablet Take 1 tablet by mouth 2 times daily as needed (cramps) WARNING:  May cause drowsiness. May impair ability to operate vehicles or machinery.   Do not use in combination with alcohol., Disp-20 tablet, R-0Print      ondansetron (ZOFRAN ODT) 4 MG disintegrating tablet Take 1 tablet by mouth every 8 hours as needed for Nausea, Disp-20 tablet, R-0Print      omeprazole (PRILOSEC) 20 MG delayed release capsule Take

## 2020-05-06 ENCOUNTER — APPOINTMENT (OUTPATIENT)
Dept: CT IMAGING | Age: 40
DRG: 720 | End: 2020-05-06
Payer: MEDICAID

## 2020-05-06 ENCOUNTER — HOSPITAL ENCOUNTER (INPATIENT)
Age: 40
LOS: 2 days | Discharge: HOME HEALTH | DRG: 720 | End: 2020-05-08
Attending: EMERGENCY MEDICINE | Admitting: INTERNAL MEDICINE
Payer: MEDICAID

## 2020-05-06 PROBLEM — K65.1 INTRA-ABDOMINAL ABSCESS (HCC): Status: ACTIVE | Noted: 2020-05-06

## 2020-05-06 PROBLEM — K50.014 CROHN'S DISEASE OF SMALL INTESTINE WITH ABSCESS (HCC): Status: ACTIVE | Noted: 2020-05-06

## 2020-05-06 PROBLEM — A41.9 SEPSIS (HCC): Status: ACTIVE | Noted: 2020-05-06

## 2020-05-06 PROBLEM — N30.00 ACUTE CYSTITIS WITHOUT HEMATURIA: Status: ACTIVE | Noted: 2020-05-06

## 2020-05-06 PROBLEM — K50.914 ACUTE CROHN'S DISEASE WITH ABSCESS (HCC): Status: ACTIVE | Noted: 2020-02-20

## 2020-05-06 LAB
A/G RATIO: 0.7 (ref 1.1–2.2)
ALBUMIN SERPL-MCNC: 3.4 G/DL (ref 3.4–5)
ALP BLD-CCNC: 53 U/L (ref 40–129)
ALT SERPL-CCNC: 9 U/L (ref 10–40)
ANION GAP SERPL CALCULATED.3IONS-SCNC: 15 MMOL/L (ref 3–16)
AST SERPL-CCNC: 36 U/L (ref 15–37)
BACTERIA: ABNORMAL /HPF
BASE EXCESS VENOUS: 2.6 MMOL/L (ref -2–3)
BASOPHILS ABSOLUTE: 0.1 K/UL (ref 0–0.2)
BASOPHILS RELATIVE PERCENT: 0.7 %
BILIRUB SERPL-MCNC: 0.5 MG/DL (ref 0–1)
BILIRUBIN URINE: ABNORMAL
BLOOD, URINE: ABNORMAL
BUN BLDV-MCNC: 4 MG/DL (ref 7–20)
CALCIUM SERPL-MCNC: 8.9 MG/DL (ref 8.3–10.6)
CARBOXYHEMOGLOBIN: 1.5 % (ref 0–1.5)
CHLORIDE BLD-SCNC: 94 MMOL/L (ref 99–110)
CLARITY: ABNORMAL
CO2: 23 MMOL/L (ref 21–32)
COLOR: YELLOW
CREAT SERPL-MCNC: <0.5 MG/DL (ref 0.6–1.1)
EOSINOPHILS ABSOLUTE: 0 K/UL (ref 0–0.6)
EOSINOPHILS RELATIVE PERCENT: 0.1 %
GFR AFRICAN AMERICAN: >60
GFR NON-AFRICAN AMERICAN: >60
GLOBULIN: 4.8 G/DL
GLUCOSE BLD-MCNC: 93 MG/DL (ref 70–99)
GLUCOSE URINE: NEGATIVE MG/DL
HCG(URINE) PREGNANCY TEST: NEGATIVE
HCO3 VENOUS: 26.1 MMOL/L (ref 24–28)
HCT VFR BLD CALC: 29.3 % (ref 36–48)
HEMOGLOBIN, VEN, REDUCED: 68.6 %
HEMOGLOBIN: 9.3 G/DL (ref 12–16)
KETONES, URINE: ABNORMAL MG/DL
LACTIC ACID, SEPSIS: 1 MMOL/L (ref 0.4–1.9)
LACTIC ACID, SEPSIS: 1.3 MMOL/L (ref 0.4–1.9)
LEUKOCYTE ESTERASE, URINE: ABNORMAL
LIPASE: 14 U/L (ref 13–60)
LYMPHOCYTES ABSOLUTE: 1.4 K/UL (ref 1–5.1)
LYMPHOCYTES RELATIVE PERCENT: 7.5 %
MCH RBC QN AUTO: 18.8 PG (ref 26–34)
MCHC RBC AUTO-ENTMCNC: 31.7 G/DL (ref 31–36)
MCV RBC AUTO: 59.1 FL (ref 80–100)
METHEMOGLOBIN VENOUS: 0.5 % (ref 0–1.5)
MICROSCOPIC EXAMINATION: YES
MONOCYTES ABSOLUTE: 1.8 K/UL (ref 0–1.3)
MONOCYTES RELATIVE PERCENT: 9.3 %
NEUTROPHILS ABSOLUTE: 15.9 K/UL (ref 1.7–7.7)
NEUTROPHILS RELATIVE PERCENT: 82.4 %
NITRITE, URINE: POSITIVE
O2 SAT, VEN: 30 %
PCO2, VEN: 37.4 MMHG (ref 41–51)
PDW BLD-RTO: 20.9 % (ref 12.4–15.4)
PH UA: 7 (ref 5–8)
PH VENOUS: 7.46 (ref 7.35–7.45)
PLATELET # BLD: 824 K/UL (ref 135–450)
PMV BLD AUTO: 7.5 FL (ref 5–10.5)
PO2, VEN: 24.7 MMHG (ref 25–40)
POTASSIUM REFLEX MAGNESIUM: 4.6 MMOL/L (ref 3.5–5.1)
PROTEIN UA: 30 MG/DL
RBC # BLD: 4.95 M/UL (ref 4–5.2)
RBC UA: ABNORMAL /HPF (ref 0–4)
SODIUM BLD-SCNC: 132 MMOL/L (ref 136–145)
SPECIFIC GRAVITY UA: 1.01 (ref 1–1.03)
TCO2 CALC VENOUS: 27 MMOL/L
TOTAL PROTEIN: 8.2 G/DL (ref 6.4–8.2)
URINE TYPE: ABNORMAL
UROBILINOGEN, URINE: 1 E.U./DL
WBC # BLD: 19.3 K/UL (ref 4–11)
WBC UA: >100 /HPF (ref 0–5)

## 2020-05-06 PROCEDURE — 6360000002 HC RX W HCPCS: Performed by: STUDENT IN AN ORGANIZED HEALTH CARE EDUCATION/TRAINING PROGRAM

## 2020-05-06 PROCEDURE — 36415 COLL VENOUS BLD VENIPUNCTURE: CPT

## 2020-05-06 PROCEDURE — 6360000004 HC RX CONTRAST MEDICATION: Performed by: EMERGENCY MEDICINE

## 2020-05-06 PROCEDURE — 84703 CHORIONIC GONADOTROPIN ASSAY: CPT

## 2020-05-06 PROCEDURE — 2580000003 HC RX 258: Performed by: EMERGENCY MEDICINE

## 2020-05-06 PROCEDURE — 87186 SC STD MICRODIL/AGAR DIL: CPT

## 2020-05-06 PROCEDURE — 1200000000 HC SEMI PRIVATE

## 2020-05-06 PROCEDURE — 87086 URINE CULTURE/COLONY COUNT: CPT

## 2020-05-06 PROCEDURE — 6360000002 HC RX W HCPCS: Performed by: EMERGENCY MEDICINE

## 2020-05-06 PROCEDURE — 96376 TX/PRO/DX INJ SAME DRUG ADON: CPT

## 2020-05-06 PROCEDURE — 0W9G30Z DRAINAGE OF PERITONEAL CAVITY WITH DRAINAGE DEVICE, PERCUTANEOUS APPROACH: ICD-10-PCS | Performed by: RADIOLOGY

## 2020-05-06 PROCEDURE — 2580000003 HC RX 258: Performed by: STUDENT IN AN ORGANIZED HEALTH CARE EDUCATION/TRAINING PROGRAM

## 2020-05-06 PROCEDURE — 87205 SMEAR GRAM STAIN: CPT

## 2020-05-06 PROCEDURE — 87040 BLOOD CULTURE FOR BACTERIA: CPT

## 2020-05-06 PROCEDURE — 80053 COMPREHEN METABOLIC PANEL: CPT

## 2020-05-06 PROCEDURE — 87070 CULTURE OTHR SPECIMN AEROBIC: CPT

## 2020-05-06 PROCEDURE — 96375 TX/PRO/DX INJ NEW DRUG ADDON: CPT

## 2020-05-06 PROCEDURE — 82803 BLOOD GASES ANY COMBINATION: CPT

## 2020-05-06 PROCEDURE — 99254 IP/OBS CNSLTJ NEW/EST MOD 60: CPT | Performed by: SURGERY

## 2020-05-06 PROCEDURE — 74177 CT ABD & PELVIS W/CONTRAST: CPT

## 2020-05-06 PROCEDURE — 96365 THER/PROPH/DIAG IV INF INIT: CPT

## 2020-05-06 PROCEDURE — 87077 CULTURE AEROBIC IDENTIFY: CPT

## 2020-05-06 PROCEDURE — 87075 CULTR BACTERIA EXCEPT BLOOD: CPT

## 2020-05-06 PROCEDURE — 74176 CT ABD & PELVIS W/O CONTRAST: CPT

## 2020-05-06 PROCEDURE — 81001 URINALYSIS AUTO W/SCOPE: CPT

## 2020-05-06 PROCEDURE — 6360000004 HC RX CONTRAST MEDICATION: Performed by: INTERNAL MEDICINE

## 2020-05-06 PROCEDURE — 85025 COMPLETE CBC W/AUTO DIFF WBC: CPT

## 2020-05-06 PROCEDURE — 99255 IP/OBS CONSLTJ NEW/EST HI 80: CPT | Performed by: INTERNAL MEDICINE

## 2020-05-06 PROCEDURE — 6360000002 HC RX W HCPCS: Performed by: RADIOLOGY

## 2020-05-06 PROCEDURE — 87076 CULTURE ANAEROBE IDENT EACH: CPT

## 2020-05-06 PROCEDURE — 83690 ASSAY OF LIPASE: CPT

## 2020-05-06 PROCEDURE — 2500000003 HC RX 250 WO HCPCS: Performed by: STUDENT IN AN ORGANIZED HEALTH CARE EDUCATION/TRAINING PROGRAM

## 2020-05-06 PROCEDURE — 2709999900 CT PERITONEAL/RETROPERITONEAL PERC DRAIN

## 2020-05-06 PROCEDURE — 99285 EMERGENCY DEPT VISIT HI MDM: CPT

## 2020-05-06 PROCEDURE — 6370000000 HC RX 637 (ALT 250 FOR IP): Performed by: STUDENT IN AN ORGANIZED HEALTH CARE EDUCATION/TRAINING PROGRAM

## 2020-05-06 PROCEDURE — 83605 ASSAY OF LACTIC ACID: CPT

## 2020-05-06 RX ORDER — SODIUM CHLORIDE, SODIUM LACTATE, POTASSIUM CHLORIDE, CALCIUM CHLORIDE 600; 310; 30; 20 MG/100ML; MG/100ML; MG/100ML; MG/100ML
INJECTION, SOLUTION INTRAVENOUS CONTINUOUS
Status: DISCONTINUED | OUTPATIENT
Start: 2020-05-06 | End: 2020-05-06

## 2020-05-06 RX ORDER — MIDAZOLAM HYDROCHLORIDE 1 MG/ML
1 INJECTION INTRAMUSCULAR; INTRAVENOUS ONCE
Status: COMPLETED | OUTPATIENT
Start: 2020-05-06 | End: 2020-05-06

## 2020-05-06 RX ORDER — FENTANYL CITRATE 50 UG/ML
25 INJECTION, SOLUTION INTRAMUSCULAR; INTRAVENOUS ONCE
Status: COMPLETED | OUTPATIENT
Start: 2020-05-06 | End: 2020-05-06

## 2020-05-06 RX ORDER — SODIUM CHLORIDE, SODIUM LACTATE, POTASSIUM CHLORIDE, CALCIUM CHLORIDE 600; 310; 30; 20 MG/100ML; MG/100ML; MG/100ML; MG/100ML
1000 INJECTION, SOLUTION INTRAVENOUS ONCE
Status: COMPLETED | OUTPATIENT
Start: 2020-05-06 | End: 2020-05-06

## 2020-05-06 RX ORDER — ONDANSETRON 2 MG/ML
4 INJECTION INTRAMUSCULAR; INTRAVENOUS EVERY 6 HOURS PRN
Status: DISCONTINUED | OUTPATIENT
Start: 2020-05-06 | End: 2020-05-06 | Stop reason: SDUPTHER

## 2020-05-06 RX ORDER — ACETAMINOPHEN 650 MG/1
650 SUPPOSITORY RECTAL EVERY 6 HOURS PRN
Status: DISCONTINUED | OUTPATIENT
Start: 2020-05-06 | End: 2020-05-08 | Stop reason: HOSPADM

## 2020-05-06 RX ORDER — METOCLOPRAMIDE HYDROCHLORIDE 5 MG/ML
10 INJECTION INTRAMUSCULAR; INTRAVENOUS 2 TIMES DAILY PRN
Status: DISCONTINUED | OUTPATIENT
Start: 2020-05-06 | End: 2020-05-06

## 2020-05-06 RX ORDER — ACETAMINOPHEN 325 MG/1
650 TABLET ORAL EVERY 6 HOURS PRN
Status: DISCONTINUED | OUTPATIENT
Start: 2020-05-06 | End: 2020-05-08 | Stop reason: HOSPADM

## 2020-05-06 RX ORDER — TRAMADOL HYDROCHLORIDE 50 MG/1
50 TABLET ORAL EVERY 6 HOURS PRN
COMMUNITY

## 2020-05-06 RX ORDER — SODIUM CHLORIDE 0.9 % (FLUSH) 0.9 %
10 SYRINGE (ML) INJECTION EVERY 12 HOURS SCHEDULED
Status: DISCONTINUED | OUTPATIENT
Start: 2020-05-06 | End: 2020-05-08 | Stop reason: HOSPADM

## 2020-05-06 RX ORDER — SODIUM CHLORIDE 0.9 % (FLUSH) 0.9 %
10 SYRINGE (ML) INJECTION PRN
Status: DISCONTINUED | OUTPATIENT
Start: 2020-05-06 | End: 2020-05-08 | Stop reason: HOSPADM

## 2020-05-06 RX ORDER — PROMETHAZINE HYDROCHLORIDE 12.5 MG/1
12.5 TABLET ORAL EVERY 6 HOURS PRN
Status: DISCONTINUED | OUTPATIENT
Start: 2020-05-06 | End: 2020-05-08 | Stop reason: HOSPADM

## 2020-05-06 RX ORDER — SODIUM CHLORIDE, SODIUM LACTATE, POTASSIUM CHLORIDE, CALCIUM CHLORIDE 600; 310; 30; 20 MG/100ML; MG/100ML; MG/100ML; MG/100ML
INJECTION, SOLUTION INTRAVENOUS CONTINUOUS
Status: DISCONTINUED | OUTPATIENT
Start: 2020-05-06 | End: 2020-05-08

## 2020-05-06 RX ORDER — MIDAZOLAM HYDROCHLORIDE 1 MG/ML
0.5 INJECTION INTRAMUSCULAR; INTRAVENOUS ONCE
Status: COMPLETED | OUTPATIENT
Start: 2020-05-06 | End: 2020-05-06

## 2020-05-06 RX ORDER — METHYLPREDNISOLONE SODIUM SUCCINATE 125 MG/2ML
60 INJECTION, POWDER, LYOPHILIZED, FOR SOLUTION INTRAMUSCULAR; INTRAVENOUS DAILY
Status: CANCELLED | OUTPATIENT
Start: 2020-05-06

## 2020-05-06 RX ORDER — POLYETHYLENE GLYCOL 3350 17 G/17G
17 POWDER, FOR SOLUTION ORAL DAILY PRN
Status: DISCONTINUED | OUTPATIENT
Start: 2020-05-06 | End: 2020-05-08 | Stop reason: HOSPADM

## 2020-05-06 RX ORDER — FENTANYL CITRATE 50 UG/ML
50 INJECTION, SOLUTION INTRAMUSCULAR; INTRAVENOUS ONCE
Status: DISCONTINUED | OUTPATIENT
Start: 2020-05-06 | End: 2020-05-06

## 2020-05-06 RX ORDER — ONDANSETRON 2 MG/ML
4 INJECTION INTRAMUSCULAR; INTRAVENOUS EVERY 6 HOURS PRN
Status: DISCONTINUED | OUTPATIENT
Start: 2020-05-06 | End: 2020-05-08 | Stop reason: HOSPADM

## 2020-05-06 RX ADMIN — IOPAMIDOL 80 ML: 755 INJECTION, SOLUTION INTRAVENOUS at 02:46

## 2020-05-06 RX ADMIN — HYDROMORPHONE HYDROCHLORIDE 0.5 MG: 1 INJECTION, SOLUTION INTRAMUSCULAR; INTRAVENOUS; SUBCUTANEOUS at 04:51

## 2020-05-06 RX ADMIN — MEROPENEM 1 G: 1 INJECTION, POWDER, FOR SOLUTION INTRAVENOUS at 04:11

## 2020-05-06 RX ADMIN — SODIUM CHLORIDE, POTASSIUM CHLORIDE, SODIUM LACTATE AND CALCIUM CHLORIDE: 600; 310; 30; 20 INJECTION, SOLUTION INTRAVENOUS at 21:26

## 2020-05-06 RX ADMIN — IOHEXOL 50 ML: 240 INJECTION, SOLUTION INTRATHECAL; INTRAVASCULAR; INTRAVENOUS; ORAL at 13:07

## 2020-05-06 RX ADMIN — HYDROMORPHONE HYDROCHLORIDE 0.5 MG: 1 INJECTION, SOLUTION INTRAMUSCULAR; INTRAVENOUS; SUBCUTANEOUS at 02:06

## 2020-05-06 RX ADMIN — SODIUM CHLORIDE, POTASSIUM CHLORIDE, SODIUM LACTATE AND CALCIUM CHLORIDE 1000 ML: 600; 310; 30; 20 INJECTION, SOLUTION INTRAVENOUS at 02:06

## 2020-05-06 RX ADMIN — Medication 10 ML: at 09:09

## 2020-05-06 RX ADMIN — HYDROMORPHONE HYDROCHLORIDE 0.5 MG: 1 INJECTION, SOLUTION INTRAMUSCULAR; INTRAVENOUS; SUBCUTANEOUS at 06:41

## 2020-05-06 RX ADMIN — METRONIDAZOLE 500 MG: 500 INJECTION, SOLUTION INTRAVENOUS at 06:48

## 2020-05-06 RX ADMIN — ONDANSETRON 4 MG: 2 INJECTION INTRAMUSCULAR; INTRAVENOUS at 02:06

## 2020-05-06 RX ADMIN — HYDROMORPHONE HYDROCHLORIDE 0.5 MG: 1 INJECTION, SOLUTION INTRAMUSCULAR; INTRAVENOUS; SUBCUTANEOUS at 12:41

## 2020-05-06 RX ADMIN — HYDROMORPHONE HYDROCHLORIDE 0.5 MG: 1 INJECTION, SOLUTION INTRAMUSCULAR; INTRAVENOUS; SUBCUTANEOUS at 19:29

## 2020-05-06 RX ADMIN — FENTANYL CITRATE 25 MCG: 50 INJECTION INTRAMUSCULAR; INTRAVENOUS at 13:50

## 2020-05-06 RX ADMIN — FAMOTIDINE 20 MG: 10 INJECTION, SOLUTION INTRAVENOUS at 09:08

## 2020-05-06 RX ADMIN — FENTANYL CITRATE 25 MCG: 50 INJECTION INTRAMUSCULAR; INTRAVENOUS at 13:55

## 2020-05-06 RX ADMIN — HYDROMORPHONE HYDROCHLORIDE 0.5 MG: 1 INJECTION, SOLUTION INTRAMUSCULAR; INTRAVENOUS; SUBCUTANEOUS at 09:35

## 2020-05-06 RX ADMIN — SODIUM CHLORIDE, POTASSIUM CHLORIDE, SODIUM LACTATE AND CALCIUM CHLORIDE: 600; 310; 30; 20 INJECTION, SOLUTION INTRAVENOUS at 06:44

## 2020-05-06 RX ADMIN — ENOXAPARIN SODIUM 40 MG: 40 INJECTION SUBCUTANEOUS at 16:19

## 2020-05-06 RX ADMIN — MEROPENEM 1 G: 1 INJECTION, POWDER, FOR SOLUTION INTRAVENOUS at 11:43

## 2020-05-06 RX ADMIN — MEROPENEM 1 G: 1 INJECTION, POWDER, FOR SOLUTION INTRAVENOUS at 21:25

## 2020-05-06 RX ADMIN — HYDROMORPHONE HYDROCHLORIDE 0.5 MG: 1 INJECTION, SOLUTION INTRAMUSCULAR; INTRAVENOUS; SUBCUTANEOUS at 16:18

## 2020-05-06 RX ADMIN — MIDAZOLAM HYDROCHLORIDE 0.5 MG: 2 INJECTION, SOLUTION INTRAMUSCULAR; INTRAVENOUS at 13:55

## 2020-05-06 RX ADMIN — HYDROMORPHONE HYDROCHLORIDE 0.5 MG: 1 INJECTION, SOLUTION INTRAMUSCULAR; INTRAVENOUS; SUBCUTANEOUS at 22:51

## 2020-05-06 RX ADMIN — FAMOTIDINE 20 MG: 10 INJECTION, SOLUTION INTRAVENOUS at 21:25

## 2020-05-06 RX ADMIN — ACETAMINOPHEN 650 MG: 325 TABLET ORAL at 15:05

## 2020-05-06 RX ADMIN — PROMETHAZINE HYDROCHLORIDE 12.5 MG: 12.5 TABLET ORAL at 19:31

## 2020-05-06 RX ADMIN — MIDAZOLAM HYDROCHLORIDE 1 MG: 2 INJECTION, SOLUTION INTRAMUSCULAR; INTRAVENOUS at 13:50

## 2020-05-06 RX ADMIN — Medication 10 ML: at 21:26

## 2020-05-06 ASSESSMENT — PAIN DESCRIPTION - ORIENTATION
ORIENTATION: LEFT;LOWER
ORIENTATION: LEFT
ORIENTATION: RIGHT
ORIENTATION: LEFT;LOWER
ORIENTATION: RIGHT

## 2020-05-06 ASSESSMENT — PAIN DESCRIPTION - PROGRESSION
CLINICAL_PROGRESSION: NOT CHANGED

## 2020-05-06 ASSESSMENT — PAIN SCALES - GENERAL
PAINLEVEL_OUTOF10: 5
PAINLEVEL_OUTOF10: 3
PAINLEVEL_OUTOF10: 8
PAINLEVEL_OUTOF10: 10
PAINLEVEL_OUTOF10: 0
PAINLEVEL_OUTOF10: 0
PAINLEVEL_OUTOF10: 8
PAINLEVEL_OUTOF10: 9
PAINLEVEL_OUTOF10: 10
PAINLEVEL_OUTOF10: 10
PAINLEVEL_OUTOF10: 8
PAINLEVEL_OUTOF10: 10
PAINLEVEL_OUTOF10: 10
PAINLEVEL_OUTOF10: 9

## 2020-05-06 ASSESSMENT — PAIN DESCRIPTION - ONSET
ONSET: ON-GOING

## 2020-05-06 ASSESSMENT — ENCOUNTER SYMPTOMS
DIARRHEA: 1
ABDOMINAL PAIN: 1
VOMITING: 0
SHORTNESS OF BREATH: 0
NAUSEA: 1
COUGH: 0
BACK PAIN: 0

## 2020-05-06 ASSESSMENT — PAIN DESCRIPTION - DESCRIPTORS
DESCRIPTORS: SHARP
DESCRIPTORS: ACHING;SHARP
DESCRIPTORS: ACHING;SHARP
DESCRIPTORS: SHARP;ACHING
DESCRIPTORS: SHARP

## 2020-05-06 ASSESSMENT — PAIN - FUNCTIONAL ASSESSMENT
PAIN_FUNCTIONAL_ASSESSMENT: PREVENTS OR INTERFERES SOME ACTIVE ACTIVITIES AND ADLS
PAIN_FUNCTIONAL_ASSESSMENT: ACTIVITIES ARE NOT PREVENTED

## 2020-05-06 ASSESSMENT — PAIN DESCRIPTION - FREQUENCY
FREQUENCY: INTERMITTENT
FREQUENCY: INTERMITTENT
FREQUENCY: CONTINUOUS

## 2020-05-06 ASSESSMENT — PAIN DESCRIPTION - PAIN TYPE
TYPE: ACUTE PAIN
TYPE: ACUTE PAIN
TYPE: SURGICAL PAIN
TYPE: SURGICAL PAIN
TYPE: ACUTE PAIN
TYPE: SURGICAL PAIN

## 2020-05-06 ASSESSMENT — PAIN DESCRIPTION - LOCATION
LOCATION: ABDOMEN

## 2020-05-06 NOTE — PROGRESS NOTES
IMAGING SERVICES NURSING PROGRESS NOTE    Procedure:  CT Guided Drain Placement   May 6, 2020  Bonilla Reddy Bharath      Allergies: Allergies   Allergen Reactions    Bentyl [Dicyclomine Hcl] Anaphylaxis     Throat swelling       Vitals:    05/06/20 1102   BP: (!) 108/55   Pulse: 101   Resp: 16   Temp: 99.6 °F (37.6 °C)   SpO2: 98%       Recent lab work reviewed with MD: yes Davidiforos  Procedure explained to patient by MD: yes   Informed consent obtained:yes  Family with patient: inpatient    Mental Status:  Normal  Readiness to learn:  Yes  Barriers to learning: No    Pain Assessment Pre-Procedure:  Pain Present:  no  Pain Score:  0  Pain Quality/Description:      Time out Procedure Verification with:  [x] RN  [x] Physician  [x] Patient  [x] Other: CT Technologist  Procedure site marked, if applicable:  Yes    Note: Patient arrived A & O x 4, denies pain, breathing easily on room air, Spoke to Dr. Jeff Matos prior to procedure. Procedural sedation:  Fentanyl: 50 mcg  Versed: 1.5 Mg    Post Procedureal Note:  Patient tolerated procedure well. CT Guided Drain Placement . Breathing easily on room air. Report given to [facility] RN. Patient transported in stable conditon to room 5526. Pain Assessment Post-Procedure:  Pain Present:  no  Pain Score:  0  Pain Quality/Description:    Plan of Care Goals:  Safety measures met:  Yes  Patient understands explanation of procedure:  Yes    Time in:  1350  Time out:  [address] R[address]N. 5/6/2020

## 2020-05-06 NOTE — CARE COORDINATION
Case Management Assessment           Initial Evaluation                Date / Time of Evaluation: 5/6/2020 1:35 PM                 Assessment Completed by: Bipin Martin    Patient Name: Erma Weaver     YOB: 1980  Diagnosis: Sepsis ([facility] [address] [A41.9]  Sepsis ([facility] [address] [A41.9]     Date / Time: 5/6/2020 12:48 AM    Patient Admission Status: Inpatient    If patient is discharged prior to next notation, then this note serves as note for discharge by case management. Current PCP: No primary care provider on file. Clinic Patient: No    Chart Reviewed: Yes  Patient/ Family Interviewed: Yes    Initial assessment completed at bedside with: pt    Hospitalization in the last 30 days: No    Emergency Contacts:  Extended Emergency Contact Information  Primary Emergency Contact: [address] Phone: [phone]  Relation: Child   needed? No    Advance Directives:   Code Status: Full Code    Healthcare Power of : No    Financial  Payor: Chirag Pope / Plan: Anish Girard / Product Type: *No Product type* /     Pre-cert required for SNF: Yes    Pharmacy    [facility] [address] [place] [address] [phone] Remy Vigil [phone]  [address] [place] [id number]  Phone: [phone] Fax: [phone]      Potential assistance Purchasing Medications: Potential Assistance Purchasing Medications: No  Does Patient want to participate in local refill/ meds to beds program?: No    Meds To Beds General Rules:  1. Can ONLY be done Monday- Friday between 8:30am-5pm  2. Prescription(s) must be in pharmacy by 3pm to be filled same day  3. Copy of patient's insurance/ prescription drug card and patient face sheet must be sent along with the prescription(s)  4. Cost of Rx cannot be added to hospital bill. If financial assistance is needed, please contact unit  or ;   or  CANNOT provide pharmacy

## 2020-05-06 NOTE — CONSULTS
GEN: reviewed and negative except as noted in HPI. GI: reviewed and negative except as noted in HPI. + nausea, no emesis  A 14 point review of systems was conducted, significant findings as noted in HPI. All other systems negative. Physical Exam:    Vitals:    05/06/20 0106 05/06/20 0130 05/06/20 0230 05/06/20 0300   BP: (!) 116/59 (!) 114/47 113/67 105/60   Pulse: 105      Resp: 20      Temp: 100.3 °F (37.9 °C)      TempSrc: Oral      SpO2: 100% 100% 96% 97%     General Appearance: Alert, appears uncomfortable  HEENT: Normocephalic/atraumatic; PERRL; no scleral icterus; trachea midline; airway patent   Chest/Lungs: Normal effort, breathing room air, equal chest rise  Cardiovascular: Mildly tachycardic, normotensive at lower limit of normal, regular rhythm   Abdomen: Non-distended, soft, quite tender throughout, worse in the lower luis fernando-abdomen LLQ > RLQ  Skin: Warm and dry  Extremities: No edema; no cyanosis  Neuro: A&Ox3; no focal deficits; sensation intact    Laboratory Results:    CBC:   Recent Labs     05/06/20  0152   WBC 19.3*   HGB 9.3*   HCT 29.3*   MCV 59.1*   *     BMP:   Recent Labs     05/06/20  0152   *   K 4.6   CL 94*   CO2 23   BUN 4*   CREATININE <0.5*     PT/INR: No results for input(s): PROTIME, INR in the last 72 hours. APTT: No results for input(s): APTT in the last 72 hours.   Liver Profile:  Lab Results   Component Value Date    AST 36 05/06/2020    ALT 9 05/06/2020    BILIDIR <0.2 02/20/2020    BILITOT 0.5 05/06/2020    ALKPHOS 53 05/06/2020   No results found for: CHOL, HDL, TRIG  UA:   Lab Results   Component Value Date    COLORU Yellow 05/06/2020    PHUR 7.0 05/06/2020    WBCUA >100 05/06/2020    RBCUA see below 05/06/2020    MUCUS 2+ 11/08/2019    BACTERIA 3+ 05/06/2020    CLARITYU CLOUDY 05/06/2020    SPECGRAV 1.010 05/06/2020    LEUKOCYTESUR LARGE 05/06/2020    UROBILINOGEN 1.0 05/06/2020    BILIRUBINUR SMALL 05/06/2020    BLOODU SMALL 05/06/2020    GLUCOSEU segment of small bowel with severe scarring from Crohn's.  Will follow    Sy Owen M.D.  5/6/20   5:19 PM

## 2020-05-06 NOTE — H&P
History and Physical  PGY-3    Hospital Day: 1   Code:Full Code  Admit Date: 5/6/2020 12:48 AM            PCP: No primary care provider on file. Chief Complaint: Abd pain, pre-syncope    History of present illness:   Lurdes Lopez is a 38 y.o. female with a PMH Crohn's disease (on Remecade since Feb 2020, off steroids since then) and other as below who p/w worsening abd pain, loss of appetite x 1 week, culminating in a pre-syncopal episode today where she reports she was walking around her house, felt very weak and light-headed as if she was going to pass out. She did not fall or lose consciousness, was able to walk back to her bed, lie down, and call her friend who encouraged her to call EMS and come to ED. She remembers all events leading up to this point. She endorses abd pain worse in LLQ, crampy, w no clear alleviating factors (home tramadol does not help), exacerbated by eating and sometimes moving, loss of appetite, subjective weight loss, mild fevers, chills x 7-10 days. She reports she hasn't been eating or drinking in large part because eating causes her severe abd pain. She denies vomiting, diarrhea, bleeding, cough, SOB, chest pain, loss of taste or smell, LOC, known sick contacts. She endorses generalized fatigue and suprapubic as well as urethral pain on urination.     Review ofsystems:   History obtained from the patient    Review of Systems as described in HPI    ============================================================================  Past medical history:  Past Medical History:   Diagnosis Date    Crohn's disease ([facility] [address]     Tuberculosis        Past Surgical History:   Procedure Laterality Date    LAPAROSCOPY         Social History:   The patient lives at home    Alcohol:  none  Illicit drugs: smokes marijuana regularly, has not been able to this week because feeling too ill  Tobacco:  none    Family History:  Family History   Problem Relation Age of

## 2020-05-06 NOTE — ED PROVIDER NOTES
[facility] ENCOUNTER          ATTENDING PHYSICIAN NOTE       Date of evaluation: 5/6/2020    Chief Complaint     Abdominal Pain      History of Present Illness     Harrison Dasilva is a 38 y.o. female who presents with a complaint of abdominal pain. Patient has history of Crohn's disease with multiple intra-abdominal abscesses, and recently completed her second Remicade infusion last week. She states that she had pain throughout the course of the evening, that was nonresponsive to home tramadol, and at home nausea medication although she cannot remember the name of this. She says tramadol really does not help her pain anyway. She came here for evaluation tonight because the pain was running from sleeping. She states she is had a couple episodes of loose watery stool 2 days ago but has not stooled since then denies any hematochezia, hematemesis or melena. She denies fevers or chills. She denies vomiting but states she has had a poor appetite secondary to pain. Review of Systems     Review of Systems   Constitutional: Positive for chills and fever. Respiratory: Negative for cough and shortness of breath. Cardiovascular: Negative for chest pain. Gastrointestinal: Positive for abdominal pain, diarrhea and nausea. Negative for vomiting. Genitourinary: Negative for difficulty urinating and dysuria. Musculoskeletal: Negative for back pain and neck pain. All other systems reviewed and are negative. Past Medical, Surgical, Family, and Social History     She has a past medical history of Crohn's disease ([address] and Tuberculosis. She has a past surgical history that includes laparoscopy. Her family history includes Cancer in her maternal grandmother; Crohn's Disease in an other family member; Diabetes in her father, mother, and sister. She reports that she has quit smoking. She has never used smokeless tobacco. She reports previous alcohol use.  She reports current bowel although it does not appear to be connected to a bowel loop on this    limited exam.  Recommend oral contrast exam for clarification.           LABS:   Results for orders placed or performed during the hospital encounter of 05/06/20   Urinalysis, reflex to microscopic (Lab)   Result Value Ref Range    Color, UA Yellow Straw/Yellow    Clarity, UA CLOUDY (A) Clear    Glucose, Ur Negative Negative mg/dL    Bilirubin Urine SMALL (A) Negative    Ketones, Urine TRACE (A) Negative mg/dL    Specific Gravity, UA 1.010 1.005 - 1.030    Blood, Urine SMALL (A) Negative    pH, UA 7.0 5.0 - 8.0    Protein, UA 30 (A) Negative mg/dL    Urobilinogen, Urine 1.0 <2.0 E.U./dL    Nitrite, Urine POSITIVE (A) Negative    Leukocyte Esterase, Urine LARGE (A) Negative    Microscopic Examination YES     Urine Type Voided    hCG, qualitative, pregnancy (Lab)   Result Value Ref Range    HCG(Urine) Pregnancy Test Negative Detects HCG level >20 MIU/mL   CBC Auto Differential   Result Value Ref Range    WBC 19.3 (H) 4.0 - 11.0 K/uL    RBC 4.95 4.00 - 5.20 M/uL    Hemoglobin 9.3 (L) 12.0 - 16.0 g/dL    Hematocrit 29.3 (L) 36.0 - 48.0 %    MCV 59.1 (L) 80.0 - 100.0 fL    MCH 18.8 (L) 26.0 - 34.0 pg    MCHC 31.7 31.0 - 36.0 g/dL    RDW 20.9 (H) 12.4 - 15.4 %    Platelets 829 (H) 321 - 450 K/uL    MPV 7.5 5.0 - 10.5 fL    Neutrophils % 82.4 %    Lymphocytes % 7.5 %    Monocytes % 9.3 %    Eosinophils % 0.1 %    Basophils % 0.7 %    Neutrophils Absolute 15.9 (H) 1.7 - 7.7 K/uL    Lymphocytes Absolute 1.4 1.0 - 5.1 K/uL    Monocytes Absolute 1.8 (H) 0.0 - 1.3 K/uL    Eosinophils Absolute 0.0 0.0 - 0.6 K/uL    Basophils Absolute 0.1 0.0 - 0.2 K/uL   Comprehensive Metabolic Panel w/ Reflex to MG   Result Value Ref Range    Sodium 132 (L) 136 - 145 mmol/L    Potassium reflex Magnesium 4.6 3.5 - 5.1 mmol/L    Chloride 94 (L) 99 - 110 mmol/L    CO2 23 21 - 32 mmol/L    Anion Gap 15 3 - 16    Glucose 93 70 - 99 mg/dL    BUN 4 (L) 7 - 20 mg/dL left lower quadrant abdominal pain in the setting of known Crohn's disease, on Remicade infusions. On initial examination she was markedly tender but this improved with a single dose of Dilaudid. She is given fluids and Zofran as well for symptom management. Laboratory evaluation shows a leukocytosis with a white count of 19,000, normal lactate, and VBG reassuring against sepsis. Hypertensive metabolic panel is unremarkable. Urinalysis shows greater than 100 whites, concerning for possible urinary tract infection although patient states her symptoms are more abdominal pain than urinary in nature. Nevertheless a follow-up on urine culture was sent. She was sent for CT abdomen and pelvis given her leukocytosis, low-grade temperature, and history of multiple intra-abdominal abscesses. This showed a 6.6 cm possible abscess versus loop of bowel, favoring abscess per the radiologist read. There is no sign of perforation, appendicitis, or diverticulitis. General surgery was consulted and they recommended meropenem based on previous ID evaluation. Therefore antibiotics were changed from Zosyn to meropenem. I also recommended admission to the hospitalist for continued IV biotics, and they will follow along as there are no plans for immediate surgery to drain this. The patient is amenable to the plan of care and case will be discussed with hospitalist..        Clinical Impression     1. Acute Crohn's disease with abscess ([address]        Disposition     PATIENT REFERRED TO:  No follow-up provider specified.     DISCHARGE MEDICATIONS:  New Prescriptions    No medications on file       DISPOSITION Decision To Admit 05/06/2020 04:01:00 AM          Merlin Pleas, MD  05/06/20 61

## 2020-05-07 LAB
ALBUMIN SERPL-MCNC: 3.1 G/DL (ref 3.4–5)
ALBUMIN SERPL-MCNC: 3.2 G/DL (ref 3.4–5)
ANION GAP SERPL CALCULATED.3IONS-SCNC: 11 MMOL/L (ref 3–16)
ANION GAP SERPL CALCULATED.3IONS-SCNC: 11 MMOL/L (ref 3–16)
BASOPHILS ABSOLUTE: 0 K/UL (ref 0–0.2)
BASOPHILS ABSOLUTE: 0 K/UL (ref 0–0.2)
BASOPHILS RELATIVE PERCENT: 0.2 %
BASOPHILS RELATIVE PERCENT: 0.3 %
BUN BLDV-MCNC: 3 MG/DL (ref 7–20)
BUN BLDV-MCNC: 3 MG/DL (ref 7–20)
CALCIUM SERPL-MCNC: 8.7 MG/DL (ref 8.3–10.6)
CALCIUM SERPL-MCNC: 8.7 MG/DL (ref 8.3–10.6)
CHLORIDE BLD-SCNC: 99 MMOL/L (ref 99–110)
CHLORIDE BLD-SCNC: 99 MMOL/L (ref 99–110)
CO2: 27 MMOL/L (ref 21–32)
CO2: 27 MMOL/L (ref 21–32)
CREAT SERPL-MCNC: <0.5 MG/DL (ref 0.6–1.1)
CREAT SERPL-MCNC: <0.5 MG/DL (ref 0.6–1.1)
EOSINOPHILS ABSOLUTE: 0.1 K/UL (ref 0–0.6)
EOSINOPHILS ABSOLUTE: 0.1 K/UL (ref 0–0.6)
EOSINOPHILS RELATIVE PERCENT: 0.5 %
EOSINOPHILS RELATIVE PERCENT: 0.6 %
GFR AFRICAN AMERICAN: >60
GFR AFRICAN AMERICAN: >60
GFR NON-AFRICAN AMERICAN: >60
GFR NON-AFRICAN AMERICAN: >60
GLUCOSE BLD-MCNC: 82 MG/DL (ref 70–99)
GLUCOSE BLD-MCNC: 83 MG/DL (ref 70–99)
HCT VFR BLD CALC: 24.9 % (ref 36–48)
HCT VFR BLD CALC: 25 % (ref 36–48)
HEMOGLOBIN: 7.9 G/DL (ref 12–16)
HEMOGLOBIN: 8 G/DL (ref 12–16)
LYMPHOCYTES ABSOLUTE: 3.4 K/UL (ref 1–5.1)
LYMPHOCYTES ABSOLUTE: 3.9 K/UL (ref 1–5.1)
LYMPHOCYTES RELATIVE PERCENT: 28.3 %
LYMPHOCYTES RELATIVE PERCENT: 32.7 %
MAGNESIUM: 1.9 MG/DL (ref 1.8–2.4)
MAGNESIUM: 2 MG/DL (ref 1.8–2.4)
MCH RBC QN AUTO: 18.4 PG (ref 26–34)
MCH RBC QN AUTO: 18.8 PG (ref 26–34)
MCHC RBC AUTO-ENTMCNC: 31.5 G/DL (ref 31–36)
MCHC RBC AUTO-ENTMCNC: 32.3 G/DL (ref 31–36)
MCV RBC AUTO: 58.2 FL (ref 80–100)
MCV RBC AUTO: 58.3 FL (ref 80–100)
MONOCYTES ABSOLUTE: 1 K/UL (ref 0–1.3)
MONOCYTES ABSOLUTE: 1.1 K/UL (ref 0–1.3)
MONOCYTES RELATIVE PERCENT: 7.9 %
MONOCYTES RELATIVE PERCENT: 8.8 %
NEUTROPHILS ABSOLUTE: 7 K/UL (ref 1.7–7.7)
NEUTROPHILS ABSOLUTE: 7.5 K/UL (ref 1.7–7.7)
NEUTROPHILS RELATIVE PERCENT: 58.6 %
NEUTROPHILS RELATIVE PERCENT: 62.1 %
ORGANISM: ABNORMAL
PDW BLD-RTO: 20.2 % (ref 12.4–15.4)
PDW BLD-RTO: 20.4 % (ref 12.4–15.4)
PHOSPHORUS: 2.8 MG/DL (ref 2.5–4.9)
PHOSPHORUS: 2.8 MG/DL (ref 2.5–4.9)
PLATELET # BLD: 692 K/UL (ref 135–450)
PLATELET # BLD: 705 K/UL (ref 135–450)
PMV BLD AUTO: 6.8 FL (ref 5–10.5)
PMV BLD AUTO: 7.1 FL (ref 5–10.5)
POTASSIUM SERPL-SCNC: 4.3 MMOL/L (ref 3.5–5.1)
POTASSIUM SERPL-SCNC: 4.3 MMOL/L (ref 3.5–5.1)
RBC # BLD: 4.28 M/UL (ref 4–5.2)
RBC # BLD: 4.29 M/UL (ref 4–5.2)
SODIUM BLD-SCNC: 137 MMOL/L (ref 136–145)
SODIUM BLD-SCNC: 137 MMOL/L (ref 136–145)
URINE CULTURE, ROUTINE: ABNORMAL
WBC # BLD: 12 K/UL (ref 4–11)
WBC # BLD: 12 K/UL (ref 4–11)

## 2020-05-07 PROCEDURE — 2580000003 HC RX 258: Performed by: STUDENT IN AN ORGANIZED HEALTH CARE EDUCATION/TRAINING PROGRAM

## 2020-05-07 PROCEDURE — 6360000002 HC RX W HCPCS: Performed by: STUDENT IN AN ORGANIZED HEALTH CARE EDUCATION/TRAINING PROGRAM

## 2020-05-07 PROCEDURE — 83735 ASSAY OF MAGNESIUM: CPT

## 2020-05-07 PROCEDURE — 36415 COLL VENOUS BLD VENIPUNCTURE: CPT

## 2020-05-07 PROCEDURE — 2500000003 HC RX 250 WO HCPCS: Performed by: STUDENT IN AN ORGANIZED HEALTH CARE EDUCATION/TRAINING PROGRAM

## 2020-05-07 PROCEDURE — 80069 RENAL FUNCTION PANEL: CPT

## 2020-05-07 PROCEDURE — 6370000000 HC RX 637 (ALT 250 FOR IP): Performed by: STUDENT IN AN ORGANIZED HEALTH CARE EDUCATION/TRAINING PROGRAM

## 2020-05-07 PROCEDURE — 1200000000 HC SEMI PRIVATE

## 2020-05-07 PROCEDURE — 85025 COMPLETE CBC W/AUTO DIFF WBC: CPT

## 2020-05-07 PROCEDURE — 99232 SBSQ HOSP IP/OBS MODERATE 35: CPT | Performed by: INTERNAL MEDICINE

## 2020-05-07 RX ORDER — OXYCODONE HYDROCHLORIDE 5 MG/1
5 TABLET ORAL EVERY 4 HOURS PRN
Status: DISCONTINUED | OUTPATIENT
Start: 2020-05-07 | End: 2020-05-08 | Stop reason: HOSPADM

## 2020-05-07 RX ORDER — MAGNESIUM SULFATE 1 G/100ML
1 INJECTION INTRAVENOUS ONCE
Status: COMPLETED | OUTPATIENT
Start: 2020-05-07 | End: 2020-05-07

## 2020-05-07 RX ADMIN — FAMOTIDINE 20 MG: 10 INJECTION, SOLUTION INTRAVENOUS at 21:27

## 2020-05-07 RX ADMIN — HYDROMORPHONE HYDROCHLORIDE 0.5 MG: 1 INJECTION, SOLUTION INTRAMUSCULAR; INTRAVENOUS; SUBCUTANEOUS at 02:46

## 2020-05-07 RX ADMIN — OXYCODONE 5 MG: 5 TABLET ORAL at 13:03

## 2020-05-07 RX ADMIN — MAGNESIUM SULFATE HEPTAHYDRATE 1 G: 1 INJECTION, SOLUTION INTRAVENOUS at 06:26

## 2020-05-07 RX ADMIN — MEROPENEM 1 G: 1 INJECTION, POWDER, FOR SOLUTION INTRAVENOUS at 21:27

## 2020-05-07 RX ADMIN — ENOXAPARIN SODIUM 40 MG: 40 INJECTION SUBCUTANEOUS at 12:38

## 2020-05-07 RX ADMIN — OXYCODONE 5 MG: 5 TABLET ORAL at 21:13

## 2020-05-07 RX ADMIN — HYDROMORPHONE HYDROCHLORIDE 0.5 MG: 1 INJECTION, SOLUTION INTRAMUSCULAR; INTRAVENOUS; SUBCUTANEOUS at 07:13

## 2020-05-07 RX ADMIN — SODIUM PHOSPHATE, MONOBASIC, MONOHYDRATE 10 MMOL: 276; 142 INJECTION, SOLUTION INTRAVENOUS at 07:40

## 2020-05-07 RX ADMIN — Medication 10 ML: at 21:27

## 2020-05-07 RX ADMIN — ACETAMINOPHEN 650 MG: 325 TABLET ORAL at 14:56

## 2020-05-07 RX ADMIN — HYDROMORPHONE HYDROCHLORIDE 0.5 MG: 1 INJECTION, SOLUTION INTRAMUSCULAR; INTRAVENOUS; SUBCUTANEOUS at 23:58

## 2020-05-07 RX ADMIN — HYDROMORPHONE HYDROCHLORIDE 0.5 MG: 1 INJECTION, SOLUTION INTRAMUSCULAR; INTRAVENOUS; SUBCUTANEOUS at 14:55

## 2020-05-07 RX ADMIN — Medication 10 ML: at 12:40

## 2020-05-07 RX ADMIN — MEROPENEM 1 G: 1 INJECTION, POWDER, FOR SOLUTION INTRAVENOUS at 03:31

## 2020-05-07 RX ADMIN — FAMOTIDINE 20 MG: 10 INJECTION, SOLUTION INTRAVENOUS at 12:38

## 2020-05-07 RX ADMIN — MEROPENEM 1 G: 1 INJECTION, POWDER, FOR SOLUTION INTRAVENOUS at 12:39

## 2020-05-07 RX ADMIN — ACETAMINOPHEN 650 MG: 325 TABLET ORAL at 07:19

## 2020-05-07 ASSESSMENT — PAIN DESCRIPTION - PROGRESSION
CLINICAL_PROGRESSION: NOT CHANGED

## 2020-05-07 ASSESSMENT — PAIN DESCRIPTION - ORIENTATION
ORIENTATION: RIGHT
ORIENTATION: RIGHT;MID
ORIENTATION: RIGHT;MID
ORIENTATION: RIGHT
ORIENTATION: RIGHT;MID

## 2020-05-07 ASSESSMENT — PAIN DESCRIPTION - FREQUENCY
FREQUENCY: CONTINUOUS

## 2020-05-07 ASSESSMENT — PAIN DESCRIPTION - DESCRIPTORS
DESCRIPTORS: ACHING;SORE
DESCRIPTORS: ACHING
DESCRIPTORS: ACHING;SORE

## 2020-05-07 ASSESSMENT — PAIN SCALES - GENERAL
PAINLEVEL_OUTOF10: 3
PAINLEVEL_OUTOF10: 7
PAINLEVEL_OUTOF10: 6
PAINLEVEL_OUTOF10: 8
PAINLEVEL_OUTOF10: 5
PAINLEVEL_OUTOF10: 5
PAINLEVEL_OUTOF10: 7
PAINLEVEL_OUTOF10: 8
PAINLEVEL_OUTOF10: 3
PAINLEVEL_OUTOF10: 5
PAINLEVEL_OUTOF10: 5

## 2020-05-07 ASSESSMENT — PAIN DESCRIPTION - LOCATION
LOCATION: ABDOMEN

## 2020-05-07 ASSESSMENT — PAIN - FUNCTIONAL ASSESSMENT
PAIN_FUNCTIONAL_ASSESSMENT: ACTIVITIES ARE NOT PREVENTED
PAIN_FUNCTIONAL_ASSESSMENT: PREVENTS OR INTERFERES SOME ACTIVE ACTIVITIES AND ADLS
PAIN_FUNCTIONAL_ASSESSMENT: PREVENTS OR INTERFERES SOME ACTIVE ACTIVITIES AND ADLS

## 2020-05-07 ASSESSMENT — PAIN DESCRIPTION - ONSET
ONSET: ON-GOING

## 2020-05-07 ASSESSMENT — PAIN DESCRIPTION - PAIN TYPE
TYPE: SURGICAL PAIN

## 2020-05-07 NOTE — CARE COORDINATION
Pt will need [facility] for drain management. CM spoke to pt and she does not have any company that she prefers. Will place a referral and will let her know what company is able to take her. Referral sent to [facility].  Electronically signed by Breana Esteban RN on 5/7/2020 at 11:26 AM

## 2020-05-07 NOTE — PROGRESS NOTES
Colorectal Surgery Daily Progress Note    CC: Intraabdominal abscess    SUBJECTIVE:  Patient received IR-guided drain placement for abscess with purulent output. Abdominal pain improving after drain. Otherwise no acute events overnight. Tolerating a CLD without nausea or vomiting. Ambulating. Passing gas. ROS:   A 14 point review of systems was conducted, significant findings as noted above. All other systems negative. OBJECTIVE:    PHYSICAL EXAM:  Vitals:    05/06/20 1620 05/06/20 1929 05/06/20 2245 05/07/20 0230   BP: 112/72 120/79 107/72 108/68   Pulse: 94 91 83 94   Resp: 18 18 16 16   Temp: 99.2 °F (37.3 °C) 98.4 °F (36.9 °C) 98.4 °F (36.9 °C) 98.2 °F (36.8 °C)   TempSrc: Oral Oral Oral Oral   SpO2: 97% 98% 100% 100%   Weight:       Height:           General Appearance: Alert, appears uncomfortable  HEENT: Normocephalic/atraumatic; PERRL; no scleral icterus; trachea midline  Chest/Lungs: Normal effort, breathing room air, equal chest rise  Cardiovascular: Mildly tachycardic, normotensive at lower limit of normal, regular rhythm   Abdomen: Non-distended, soft, minimally tender in the lower abdomen, drain with purulent output, drain site c/d/i  Skin: Warm and dry  Extremities: No edema; no cyanosis  Neuro: A&Ox3; no focal deficits; sensation intact      ASSESSMENT & PLAN:   This is a 38 y.o. female with a diagnosis of intraabdominal abscess. - continue to flush drain with 10cc saline BID     - continue merrem for abscess as well as UTI; ID following     - may need PICC  - ok to advance diet as tolerated  - continue IS and deep breathing  - continue OOB and ambulation  - continue medical management per primary  - will talk to  for home health for drain care    Delmar Shafer DO, PGY-1  05/07/20  6:44 AM  [phone]    Discussed management with the resident. I reviewed the resident's note and agree with the documented findings and plan of care.     Mignon Sheldon M.D.  5/8/20   1:48 PM

## 2020-05-07 NOTE — PROGRESS NOTES
BNP in the last 72 hours. Lipids: No results for input(s): CHOL, HDL in the last 72 hours. Invalid input(s): LDLCALCU, TRIGLYCERIDE  ABGs:  No results for input(s): PHART, XVE0THK, PO2ART, CBR7HKU, BEART, THGBART, I0LZCHYT, VAS9RDZ in the last 72 hours. INR: No results for input(s): INR in the last 72 hours. Lactate: No results for input(s): LACTATE in the last 72 hours. Cultures:  -----------------------------------------------------------------  RAD:   CT PERITONEAL/RETROPERITONEAL PERC DRAIN   Final Result      CT-guided 8 Lithuanian drainage of grossly purulent pelvic fluid collection as detailed above. Blood loss less than 5 mL      CT ABDOMEN PELVIS WO CONTRAST Additional Contrast? Oral and Rectal   Final Result      CT ABDOMEN PELVIS W IV CONTRAST Additional Contrast? None   Final Result       Inflamed distal small bowel with dilatation consistent with active    inflammatory bowel disease. Suspicious area of abscess is seen in the mid lower abdomen measuring 6.6    cm. Difficult to tell if this is a true abscess or abnormally dilated    bowel although it does not appear to be connected to a bowel loop on this    limited exam.  Recommend oral contrast exam for clarification. Assessment/Plan:     Avni Mccoy is a 38 y.o. female w PMH Crohn's disease on Remecade, who was admitted with sepsis likely 2/2intraabd abscess.     Sepsis 2/2 intra-abdominal abscess  SIRS criteria POA (HR, WBC, Temp), 6.6 cm abscess apparent on CT abd/pelvis w IV contrast. Lactate wnl. BCx NGTD.  - F/u body fluid cultures  - Continue Merrem  - IVF  - Pain control  - F/u CT abd/pelvis without contrast  - ID on board: continue merrem   - Surgery on board: home health for drain care     Crohn's disease w possible flare  Abd pain with hx of non-compliance, stricturing/fistulizing disease. Initiated infliximab in March, then no show for second week.  Abd pain, nausea and distention on 4/30 at which time she received

## 2020-05-08 VITALS
HEART RATE: 75 BPM | WEIGHT: 121.47 LBS | SYSTOLIC BLOOD PRESSURE: 106 MMHG | TEMPERATURE: 98.1 F | RESPIRATION RATE: 16 BRPM | OXYGEN SATURATION: 100 % | BODY MASS INDEX: 20.24 KG/M2 | DIASTOLIC BLOOD PRESSURE: 70 MMHG | HEIGHT: 65 IN

## 2020-05-08 LAB
ALBUMIN SERPL-MCNC: 2.9 G/DL (ref 3.4–5)
ANION GAP SERPL CALCULATED.3IONS-SCNC: 11 MMOL/L (ref 3–16)
BASOPHILS ABSOLUTE: 0 K/UL (ref 0–0.2)
BASOPHILS RELATIVE PERCENT: 0.8 %
BODY FLUID CULTURE, STERILE: ABNORMAL
BUN BLDV-MCNC: <2 MG/DL (ref 7–20)
CALCIUM SERPL-MCNC: 8.6 MG/DL (ref 8.3–10.6)
CHLORIDE BLD-SCNC: 101 MMOL/L (ref 99–110)
CO2: 26 MMOL/L (ref 21–32)
CREAT SERPL-MCNC: <0.5 MG/DL (ref 0.6–1.1)
EOSINOPHILS ABSOLUTE: 0.1 K/UL (ref 0–0.6)
EOSINOPHILS RELATIVE PERCENT: 2.5 %
GFR AFRICAN AMERICAN: >60
GFR NON-AFRICAN AMERICAN: >60
GLUCOSE BLD-MCNC: 123 MG/DL (ref 70–99)
GRAM STAIN RESULT: ABNORMAL
HCT VFR BLD CALC: 24.5 % (ref 36–48)
HEMOGLOBIN: 7.9 G/DL (ref 12–16)
LYMPHOCYTES ABSOLUTE: 1.5 K/UL (ref 1–5.1)
LYMPHOCYTES RELATIVE PERCENT: 28.3 %
MAGNESIUM: 2.2 MG/DL (ref 1.8–2.4)
MCH RBC QN AUTO: 18.8 PG (ref 26–34)
MCHC RBC AUTO-ENTMCNC: 32 G/DL (ref 31–36)
MCV RBC AUTO: 58.7 FL (ref 80–100)
MONOCYTES ABSOLUTE: 0.8 K/UL (ref 0–1.3)
MONOCYTES RELATIVE PERCENT: 15.3 %
NEUTROPHILS ABSOLUTE: 2.9 K/UL (ref 1.7–7.7)
NEUTROPHILS RELATIVE PERCENT: 53.1 %
ORGANISM: ABNORMAL
PDW BLD-RTO: 20.2 % (ref 12.4–15.4)
PHOSPHORUS: 2.7 MG/DL (ref 2.5–4.9)
PLATELET # BLD: 709 K/UL (ref 135–450)
PMV BLD AUTO: 7.2 FL (ref 5–10.5)
POTASSIUM SERPL-SCNC: 4 MMOL/L (ref 3.5–5.1)
RBC # BLD: 4.17 M/UL (ref 4–5.2)
SODIUM BLD-SCNC: 138 MMOL/L (ref 136–145)
WBC # BLD: 5.5 K/UL (ref 4–11)

## 2020-05-08 PROCEDURE — 2580000003 HC RX 258: Performed by: STUDENT IN AN ORGANIZED HEALTH CARE EDUCATION/TRAINING PROGRAM

## 2020-05-08 PROCEDURE — 99232 SBSQ HOSP IP/OBS MODERATE 35: CPT | Performed by: INTERNAL MEDICINE

## 2020-05-08 PROCEDURE — 2500000003 HC RX 250 WO HCPCS: Performed by: STUDENT IN AN ORGANIZED HEALTH CARE EDUCATION/TRAINING PROGRAM

## 2020-05-08 PROCEDURE — 36415 COLL VENOUS BLD VENIPUNCTURE: CPT

## 2020-05-08 PROCEDURE — 2580000003 HC RX 258: Performed by: INTERNAL MEDICINE

## 2020-05-08 PROCEDURE — 80069 RENAL FUNCTION PANEL: CPT

## 2020-05-08 PROCEDURE — 6360000002 HC RX W HCPCS: Performed by: STUDENT IN AN ORGANIZED HEALTH CARE EDUCATION/TRAINING PROGRAM

## 2020-05-08 PROCEDURE — 6360000002 HC RX W HCPCS: Performed by: INTERNAL MEDICINE

## 2020-05-08 PROCEDURE — B5181ZA FLUOROSCOPY OF SUPERIOR VENA CAVA USING LOW OSMOLAR CONTRAST, GUIDANCE: ICD-10-PCS | Performed by: INTERNAL MEDICINE

## 2020-05-08 PROCEDURE — 85025 COMPLETE CBC W/AUTO DIFF WBC: CPT

## 2020-05-08 PROCEDURE — 36569 INSJ PICC 5 YR+ W/O IMAGING: CPT

## 2020-05-08 PROCEDURE — 83735 ASSAY OF MAGNESIUM: CPT

## 2020-05-08 PROCEDURE — 02HV33Z INSERTION OF INFUSION DEVICE INTO SUPERIOR VENA CAVA, PERCUTANEOUS APPROACH: ICD-10-PCS | Performed by: INTERNAL MEDICINE

## 2020-05-08 PROCEDURE — C1751 CATH, INF, PER/CENT/MIDLINE: HCPCS

## 2020-05-08 PROCEDURE — 6370000000 HC RX 637 (ALT 250 FOR IP): Performed by: STUDENT IN AN ORGANIZED HEALTH CARE EDUCATION/TRAINING PROGRAM

## 2020-05-08 RX ORDER — OXYCODONE HYDROCHLORIDE AND ACETAMINOPHEN 5; 325 MG/1; MG/1
1 TABLET ORAL EVERY 6 HOURS PRN
Qty: 12 TABLET | Refills: 0 | Status: SHIPPED | OUTPATIENT
Start: 2020-05-08 | End: 2020-05-11

## 2020-05-08 RX ORDER — SODIUM CHLORIDE 0.9 % (FLUSH) 0.9 %
10 SYRINGE (ML) INJECTION PRN
Status: DISCONTINUED | OUTPATIENT
Start: 2020-05-08 | End: 2020-05-08 | Stop reason: HOSPADM

## 2020-05-08 RX ORDER — LIDOCAINE HYDROCHLORIDE 10 MG/ML
5 INJECTION, SOLUTION EPIDURAL; INFILTRATION; INTRACAUDAL; PERINEURAL ONCE
Status: COMPLETED | OUTPATIENT
Start: 2020-05-08 | End: 2020-05-08

## 2020-05-08 RX ORDER — SODIUM CHLORIDE 0.9 % (FLUSH) 0.9 %
10 SYRINGE (ML) INJECTION EVERY 12 HOURS SCHEDULED
Status: DISCONTINUED | OUTPATIENT
Start: 2020-05-08 | End: 2020-05-08 | Stop reason: HOSPADM

## 2020-05-08 RX ADMIN — HYDROMORPHONE HYDROCHLORIDE 0.5 MG: 1 INJECTION, SOLUTION INTRAMUSCULAR; INTRAVENOUS; SUBCUTANEOUS at 03:41

## 2020-05-08 RX ADMIN — HYDROMORPHONE HYDROCHLORIDE 0.5 MG: 1 INJECTION, SOLUTION INTRAMUSCULAR; INTRAVENOUS; SUBCUTANEOUS at 10:01

## 2020-05-08 RX ADMIN — HYDROMORPHONE HYDROCHLORIDE 0.5 MG: 1 INJECTION, SOLUTION INTRAMUSCULAR; INTRAVENOUS; SUBCUTANEOUS at 07:32

## 2020-05-08 RX ADMIN — ENOXAPARIN SODIUM 40 MG: 40 INJECTION SUBCUTANEOUS at 09:33

## 2020-05-08 RX ADMIN — SODIUM CHLORIDE 1000 MG: 900 INJECTION INTRAVENOUS at 11:04

## 2020-05-08 RX ADMIN — LIDOCAINE HYDROCHLORIDE 5 ML: 10 INJECTION, SOLUTION EPIDURAL; INFILTRATION; INTRACAUDAL; PERINEURAL at 11:05

## 2020-05-08 RX ADMIN — DIBASIC SODIUM PHOSPHATE, MONOBASIC POTASSIUM PHOSPHATE AND MONOBASIC SODIUM PHOSPHATE 2 TABLET: 852; 155; 130 TABLET ORAL at 09:33

## 2020-05-08 RX ADMIN — FAMOTIDINE 20 MG: 10 INJECTION, SOLUTION INTRAVENOUS at 09:33

## 2020-05-08 RX ADMIN — MEROPENEM 1 G: 1 INJECTION, POWDER, FOR SOLUTION INTRAVENOUS at 03:41

## 2020-05-08 RX ADMIN — Medication 10 ML: at 11:05

## 2020-05-08 RX ADMIN — Medication 10 ML: at 11:04

## 2020-05-08 ASSESSMENT — PAIN DESCRIPTION - FREQUENCY
FREQUENCY: CONTINUOUS

## 2020-05-08 ASSESSMENT — PAIN SCALES - GENERAL
PAINLEVEL_OUTOF10: 5
PAINLEVEL_OUTOF10: 8
PAINLEVEL_OUTOF10: 7
PAINLEVEL_OUTOF10: 7
PAINLEVEL_OUTOF10: 6

## 2020-05-08 ASSESSMENT — PAIN DESCRIPTION - PAIN TYPE
TYPE: SURGICAL PAIN

## 2020-05-08 ASSESSMENT — PAIN - FUNCTIONAL ASSESSMENT
PAIN_FUNCTIONAL_ASSESSMENT: ACTIVITIES ARE NOT PREVENTED

## 2020-05-08 ASSESSMENT — PAIN DESCRIPTION - LOCATION
LOCATION: ABDOMEN

## 2020-05-08 ASSESSMENT — PAIN DESCRIPTION - ONSET
ONSET: ON-GOING

## 2020-05-08 ASSESSMENT — PAIN DESCRIPTION - PROGRESSION
CLINICAL_PROGRESSION: NOT CHANGED

## 2020-05-08 ASSESSMENT — PAIN DESCRIPTION - DESCRIPTORS
DESCRIPTORS: ACHING;SORE

## 2020-05-08 ASSESSMENT — PAIN DESCRIPTION - ORIENTATION
ORIENTATION: RIGHT

## 2020-05-08 NOTE — PLAN OF CARE
Problem: Pain:  Goal: Pain level will decrease  Description: Pain level will decrease  Outcome: Ongoing  Note: Patients pain level is being monitored. Pain scale is used to assess pain and interventions are implemented as needed. Problem: Pain:  Goal: Control of acute pain  Description: Control of acute pain  Outcome: Ongoing  Note: Patients pain level is being monitored. Pain scale is used to assess pain and interventions are implemented as needed. Problem: Pain:  Goal: Control of chronic pain  Description: Control of chronic pain  Outcome: Ongoing  Note: Patients pain level is being monitored. Pain scale is used to assess pain and interventions are implemented as needed. Problem: Falls - Risk of:  Goal: Will remain free from falls  Description: Will remain free from falls  Outcome: Ongoing  Note: Fall precautions in place, call light within reach, bed alarm on, bed in lowest position, and non skid socks on.

## 2020-05-08 NOTE — DISCHARGE INSTR - COC
SpO2 99%   BMI 20.21 kg/m²     Last documented pain score (0-10 scale): Pain Level: 7  Last Weight:   Wt Readings from Last 1 Encounters:   05/06/20 121 lb 7.6 oz (55.1 kg)     Mental Status:  oriented, alert, coherent, logical and thought processes intact    IV Access:  - PICC - site  R Upper Arm, insertion date: t    Nursing Mobility/ADLs:  Walking   Independent  Transfer  Independent  Bathing  Independent  Dressing  Independent  [address]  Independent  Med Delivery   whole    Wound Care Documentation and Therapy:        Elimination:  Continence:   · Bowel: Yes  · Bladder: Yes  Urinary Catheter: None   Colostomy/Ileostomy/Ileal Conduit: No       Date of Last BM: ***    Intake/Output Summary (Last 24 hours) at 5/8/2020 0649  Last data filed at 5/8/2020 0335  Gross per 24 hour   Intake 770 ml   Output 2440 ml   Net -1670 ml     I/O last 3 completed shifts: In: 5 [P.O.:120; I.V.:600]  Out: 2090 [Urine:1975; Drains:115]    Safety Concerns:     None    Impairments/Disabilities:      None    Nutrition Therapy:  Current Nutrition Therapy:   - Oral Diet:  General    Routes of Feeding: Oral  Liquids: Thin Liquids  Daily Fluid Restriction: no  Last Modified Barium Swallow with Video (Video Swallowing Test): not done    Treatments at the Time of Hospital Discharge:   Respiratory Treatments:     Oxygen Therapy:  is not on home oxygen therapy.   Ventilator:    - No ventilator support    Rehab Therapies: ***  Weight Bearing Status/Restrictions: No weight bearing restirctions  Other Medical Equipment (for information only, NOT a DME order):  {EQUIPMENT:[id number]}  Other Treatments: ***    Patient's personal belongings (please select all that are sent with patient):  None    RN SIGNATURE:  Electronically signed by Tyesha Perez on 5/8/20 at 10:20 AM EDT    CASE MANAGEMENT/SOCIAL WORK SECTION    Inpatient Status Date: ***    Readmission Risk Assessment Score:  Readmission Risk

## 2020-05-08 NOTE — PROGRESS NOTES
and following dilation of the tract, an 8 [facility] all-purpose drainage catheter was advanced and locking loop formed in the collection. 30 mL of grossly purulent fluid was aspirated and sent for microbiologic analysis. The catheter was   sutured to the skin with 2-0 silk and connected to external drainage. Patient tolerated the procedure without difficulty. Scheduled Meds:   phosphorus  500 mg Oral BID    lidocaine 1 % injection  5 mL Intradermal Once    sodium chloride flush  10 mL Intravenous 2 times per day    meropenem  1 g Intravenous Q8H    sodium chloride flush  10 mL Intravenous 2 times per day    famotidine (PEPCID) injection  20 mg Intravenous BID    enoxaparin  40 mg Subcutaneous Daily       Continuous Infusions:      PRN Meds:  sodium chloride flush, oxyCODONE, sodium chloride flush, acetaminophen **OR** acetaminophen, polyethylene glycol, promethazine **OR** ondansetron, HYDROmorphone      Assessment:     39 yo woman. Hx latent TB, rx in past   Crohn's d - dx 5-6 years ago, prior rx Humira, followed by Dr Arnol Bueno, [address] [place] [address],  and [facility]. Admit 9/2019, + small abd collection on CT. Rx prednisone, antibiotics, plan to start [address] 2/2020 - Crohn's flare, had not been taking prednisone, no abd fluid collection  Admit [facility] 3/5/20, had laparoscopy, had terminal ileitis.     Pt had started on Remicaide, received 2nd dose on 4/20  Pt reports worse sx x 12 days with abd pain, loss of appetite. Abd located LLQ, + 2 loose stools - not bloody, + wt loss. Mild fever / chills.   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-vesicular fistula (admitted to sensation of air in urine)  Fever - resolved  Leukocytosis - WBC down    Plan:     Cont meropenem for now  Will review CT   Will f/u on cultures - await abscess E coli sensitivities, anaerobic cult  Drain per Surg / IR     Surg / GI involved   - treat with antibiotics    - no steroids per GI   - poss surgery at later date    See CHELSEY - will treat with ertapenem (once day, GNR / anaerobic coverage)     Discussed with pt, Surg Resident   Marycruz Carver MD    INFUSION ORDERS:  Invanz 1 gm iv daily through 5/20  - First dose given in hospital  - R PICC   - Disposition / date discharge  - Check CBC w diff, CMP, ESR, CRP every Mon or Tue - FAX result to [phone]  - Call with antibiotic / infusion issues, [phone]  - No f/u in outpatient ID office necessary

## 2020-05-08 NOTE — PROGRESS NOTES
Patient A&O, VSS. Drain in place with light serosanguinous output. Drain irrigated with 10 mL of NS per orders. Drain dressing clean, dry, and intact. Tolerating diet. Patient c/o abdominal pain. PRN pain medication given per orders. PICC line in place per orders. Patient awaiting discharge instructions.     /70   Pulse 75   Temp 98.1 °F (36.7 °C) (Oral)   Resp 16   Ht 5' 5\" (1.651 m)   Wt 121 lb 7.6 oz (55.1 kg)   SpO2 100%   BMI 20.21 kg/m²     Electronically signed by Nolan Rick on 5/8/2020 at 10:13 AM

## 2020-05-08 NOTE — CARE COORDINATION
[facility]     Patient aware and agreeable to services. Sebastien Maya with referral. Cee Linton will pull referral from Epic.     Jess Figueroa LPN  Care Transition Nurse  [address]  [phone]

## 2020-05-08 NOTE — DISCHARGE SUMMARY
05/08/2020    CREATININE <0.5 05/08/2020    CALCIUM 8.6 05/08/2020    PHOS 2.7 05/08/2020         Significant Diagnostic Studies    Radiology:   CT PERITONEAL/RETROPERITONEAL [facility] DRAIN   Final Result      CT-guided 8 French drainage of grossly purulent pelvic fluid collection as detailed above. Blood loss less than 5 mL      CT ABDOMEN PELVIS WO CONTRAST Additional Contrast? Oral and Rectal   Final Result      CT ABDOMEN PELVIS W IV CONTRAST Additional Contrast? None   Final Result       Inflamed distal small bowel with dilatation consistent with active    inflammatory bowel disease. Suspicious area of abscess is seen in the mid lower abdomen measuring 6.6    cm. Difficult to tell if this is a true abscess or abnormally dilated    bowel although it does not appear to be connected to a bowel loop on this    limited exam.  Recommend oral contrast exam for clarification. Consults:     IP CONSULT TO GENERAL SURGERY  IP CONSULT TO HOSPITALIST  IP CONSULT TO INFECTIOUS DISEASES  IP CONSULT TO GI  IP CONSULT TO GI  IP CONSULT TO INTERVENTIONAL RADIOLOGY  IP CONSULT TO SOCIAL WORK  IP CONSULT TO HOME CARE NEEDS      Discharge Instructions/Follow-up:  GI, Surgery, continue ertapenem through 5/20/20    Activity: activity as tolerated    Diet: Low fiber for now, then advance as tolerated    Discharge Medications:     Discharge Medication List as of 5/8/2020 10:59 AM           Details   oxyCODONE-acetaminophen (PERCOCET) 5-325 MG per tablet Take 1 tablet by mouth every 6 hours as needed for Pain for up to 3 days. , Disp-12 tablet, R-0Print              Details   traMADol (ULTRAM) 50 MG tablet Take 50 mg by mouth every 6 hours as needed for Pain. Historical Med      ondansetron (ZOFRAN ODT) 4 MG disintegrating tablet Take 1 tablet by mouth every 8 hours as needed for Nausea, Disp-20 tablet, R-0Print      omeprazole (PRILOSEC) 20 MG delayed release capsule Take 20 mg by mouth dailyHistorical Med

## 2020-05-08 NOTE — PROGRESS NOTES
Discharge Progress Note  5/8/2020 12:00 PM    Data:  Discharge order received. Action:  IV D/C'd without difficulty. See Doc Flowsheets for assessment. Patient given discharge instructions with prescriptions. Response:  Patient verbalized understanding of discharge instructions. Patient left with all belongings.     Keya Hope  ________________________________________________________________________

## 2020-05-08 NOTE — PROGRESS NOTES
Patient alert and oriented. Up x1 GB. Drain dressing clean, dry and intact. Draining light serosanguinous fluid. Fall precautions in place, call light within reach, bed alarm on, bed in lowest position, and non skid socks on. VSS. Denies needs at this time. Will continue to monitor.

## 2020-05-08 NOTE — CARE COORDINATION
Case Management Assessment            Discharge Note                    Date / Time of Note: 5/8/2020 11:04 AM                  Discharge Note Completed by: Vic Storm    Patient Name: Renae Morrison   YOB: 1980  Diagnosis: Sepsis ([facility] [A41.9]  Sepsis ([facility] [A41.9]   Date / Time: 5/6/2020 12:48 AM    Current PCP: No primary care provider on file. Clinic patient: No    Hospitalization in the last 30 days: No    Advance Directives:  Code Status: Full Code  [place] DNR form completed and on chart: No    Financial:  Payor: Khoa Reis / Plan: Jabari Morales / Product Type: *No Product type* /      Pharmacy:    [facility] [address] [place] [address], [address] [place] [address] [address] [address]  Phone: [phone] Fax: [phone]      Assistance purchasing medications?: Potential Assistance Purchasing Medications: No  Assistance provided by Case Management: None at this time    Does patient want to participate in local refill/ meds to beds program?: No    Meds To Beds General Rules:  1. Can ONLY be done Monday- Friday between 8:30am-5pm  2. Prescription(s) must be in pharmacy by 3pm to be filled same day  3. Copy of patient's insurance/ prescription drug card and patient face sheet must be sent along with the prescription(s)  4. Cost of Rx cannot be added to hospital bill. If financial assistance is needed, please contact unit  or ;  or  CANNOT provide pharmacy voucher for patients co-pays  5.  Patients can then  the prescription on their way out of the hospital at discharge, or pharmacy can deliver to the bedside if staff is available. (payment due at time of pick-up or delivery - cash, check, or card accepted)     Able to afford home medications/ co-pay costs: Yes    ADLS:  Current PT AM-PAC Score:   /24  Current OT AM-PAC Score:   /24      DISCHARGE

## 2020-05-08 NOTE — CONSULTS
PICC line education:    -Risks  -Benefits  -Alternatives  -Procedure    Discussed the above with patient, verbalized understanding, answered all questions. Provided with information on PICC care to review. PICC tip verified via [employer] (Ok to use). Reported off to patient's  Nurse Leeroy.

## 2020-05-09 LAB
ANAEROBIC CULTURE: ABNORMAL
ORGANISM: ABNORMAL

## 2020-05-10 ENCOUNTER — HOSPITAL ENCOUNTER (EMERGENCY)
Age: 40
Discharge: HOME | End: 2020-05-11
Attending: EMERGENCY MEDICINE
Payer: MEDICAID

## 2020-05-10 VITALS
DIASTOLIC BLOOD PRESSURE: 106 MMHG | BODY MASS INDEX: 18.66 KG/M2 | TEMPERATURE: 98.3 F | HEART RATE: 55 BPM | HEIGHT: 65 IN | OXYGEN SATURATION: 100 % | SYSTOLIC BLOOD PRESSURE: 119 MMHG | RESPIRATION RATE: 16 BRPM | WEIGHT: 112 LBS

## 2020-05-10 LAB
BLOOD CULTURE, ROUTINE: NORMAL
CULTURE, BLOOD 2: NORMAL

## 2020-05-10 PROCEDURE — 80053 COMPREHEN METABOLIC PANEL: CPT

## 2020-05-10 PROCEDURE — 99282 EMERGENCY DEPT VISIT SF MDM: CPT

## 2020-05-10 PROCEDURE — 85025 COMPLETE CBC W/AUTO DIFF WBC: CPT

## 2020-05-10 PROCEDURE — 83605 ASSAY OF LACTIC ACID: CPT

## 2020-05-10 ASSESSMENT — ENCOUNTER SYMPTOMS
COLOR CHANGE: 0
VOICE CHANGE: 0
CONSTIPATION: 0
DIARRHEA: 0
TROUBLE SWALLOWING: 0
NAUSEA: 0
VOMITING: 0
RHINORRHEA: 0
ABDOMINAL PAIN: 1
PHOTOPHOBIA: 0
WHEEZING: 0
COUGH: 0
BACK PAIN: 0
SHORTNESS OF BREATH: 0

## 2020-05-10 ASSESSMENT — PAIN DESCRIPTION - DESCRIPTORS: DESCRIPTORS: ACHING

## 2020-05-10 ASSESSMENT — PAIN DESCRIPTION - LOCATION: LOCATION: ABDOMEN

## 2020-05-10 ASSESSMENT — PAIN DESCRIPTION - PAIN TYPE: TYPE: ACUTE PAIN;CHRONIC PAIN

## 2020-05-10 ASSESSMENT — PAIN SCALES - GENERAL: PAINLEVEL_OUTOF10: 6

## 2020-05-10 ASSESSMENT — PAIN DESCRIPTION - ORIENTATION: ORIENTATION: LOWER

## 2020-05-11 LAB
A/G RATIO: 0.9 (ref 1.1–2.2)
ALBUMIN SERPL-MCNC: 3.5 G/DL (ref 3.4–5)
ALP BLD-CCNC: 45 U/L (ref 40–129)
ALT SERPL-CCNC: 11 U/L (ref 10–40)
ANION GAP SERPL CALCULATED.3IONS-SCNC: 12 MMOL/L (ref 3–16)
ANISOCYTOSIS: ABNORMAL
ANISOCYTOSIS: ABNORMAL
AST SERPL-CCNC: 19 U/L (ref 15–37)
ATYPICAL LYMPHOCYTE RELATIVE PERCENT: 2 % (ref 0–6)
BANDED NEUTROPHILS RELATIVE PERCENT: 1 % (ref 0–7)
BANDED NEUTROPHILS RELATIVE PERCENT: 1 % (ref 0–7)
BASOPHILS ABSOLUTE: 0 K/UL (ref 0–0.2)
BASOPHILS ABSOLUTE: 0 K/UL (ref 0–0.2)
BASOPHILS RELATIVE PERCENT: 0 %
BASOPHILS RELATIVE PERCENT: 1 %
BILIRUB SERPL-MCNC: <0.2 MG/DL (ref 0–1)
BUN BLDV-MCNC: 4 MG/DL (ref 7–20)
CALCIUM SERPL-MCNC: 9 MG/DL (ref 8.3–10.6)
CHLORIDE BLD-SCNC: 99 MMOL/L (ref 99–110)
CO2: 24 MMOL/L (ref 21–32)
CREAT SERPL-MCNC: <0.5 MG/DL (ref 0.6–1.1)
EOSINOPHILS ABSOLUTE: 0.1 K/UL (ref 0–0.6)
EOSINOPHILS ABSOLUTE: 0.2 K/UL (ref 0–0.6)
EOSINOPHILS RELATIVE PERCENT: 3 %
EOSINOPHILS RELATIVE PERCENT: 3 %
GFR AFRICAN AMERICAN: >60
GFR NON-AFRICAN AMERICAN: >60
GLOBULIN: 3.9 G/DL
GLUCOSE BLD-MCNC: 89 MG/DL (ref 70–99)
HCT VFR BLD CALC: 17.4 % (ref 36–48)
HCT VFR BLD CALC: 27.5 % (ref 36–48)
HEMOGLOBIN: 5.6 G/DL (ref 12–16)
HEMOGLOBIN: 8.9 G/DL (ref 12–16)
LACTIC ACID: 1.1 MMOL/L (ref 0.4–2)
LYMPHOCYTES ABSOLUTE: 0.7 K/UL (ref 1–5.1)
LYMPHOCYTES ABSOLUTE: 1.4 K/UL (ref 1–5.1)
LYMPHOCYTES RELATIVE PERCENT: 24 %
LYMPHOCYTES RELATIVE PERCENT: 28 %
MCH RBC QN AUTO: 18.9 PG (ref 26–34)
MCH RBC QN AUTO: 19.1 PG (ref 26–34)
MCHC RBC AUTO-ENTMCNC: 32.4 G/DL (ref 31–36)
MCHC RBC AUTO-ENTMCNC: 32.5 G/DL (ref 31–36)
MCV RBC AUTO: 58.4 FL (ref 80–100)
MCV RBC AUTO: 58.7 FL (ref 80–100)
MONOCYTES ABSOLUTE: 0.4 K/UL (ref 0–1.3)
MONOCYTES ABSOLUTE: 0.8 K/UL (ref 0–1.3)
MONOCYTES RELATIVE PERCENT: 15 %
MONOCYTES RELATIVE PERCENT: 16 %
NEUTROPHILS ABSOLUTE: 1.5 K/UL (ref 1.7–7.7)
NEUTROPHILS ABSOLUTE: 2.7 K/UL (ref 1.7–7.7)
NEUTROPHILS RELATIVE PERCENT: 53 %
NEUTROPHILS RELATIVE PERCENT: 53 %
PDW BLD-RTO: 20.3 % (ref 12.4–15.4)
PDW BLD-RTO: 20.5 % (ref 12.4–15.4)
PLATELET # BLD: 552 K/UL (ref 135–450)
PLATELET # BLD: 886 K/UL (ref 135–450)
PMV BLD AUTO: 6.7 FL (ref 5–10.5)
PMV BLD AUTO: 6.7 FL (ref 5–10.5)
POIKILOCYTES: ABNORMAL
POLYCHROMASIA: ABNORMAL
POLYCHROMASIA: ABNORMAL
POTASSIUM REFLEX MAGNESIUM: 3.7 MMOL/L (ref 3.5–5.1)
RBC # BLD: 2.96 M/UL (ref 4–5.2)
RBC # BLD: 4.7 M/UL (ref 4–5.2)
SODIUM BLD-SCNC: 135 MMOL/L (ref 136–145)
TARGET CELLS: ABNORMAL
TARGET CELLS: ABNORMAL
TOTAL PROTEIN: 7.4 G/DL (ref 6.4–8.2)
WBC # BLD: 2.8 K/UL (ref 4–11)
WBC # BLD: 5 K/UL (ref 4–11)

## 2020-05-11 PROCEDURE — 85025 COMPLETE CBC W/AUTO DIFF WBC: CPT

## 2020-05-11 PROCEDURE — 36415 COLL VENOUS BLD VENIPUNCTURE: CPT

## 2020-05-11 NOTE — ED NOTES
.Patient prepared for and ready to be discharged. Patient discharged at this time in no acute distress after verbalizing understanding of discharge instructions. Patient left after receiving After Visit Summary instructions.       Arneta Felty, RN  05/11/20 0510

## 2020-05-11 NOTE — DISCHARGE INSTR - COC
Mobility/ADLs:  Walking   {[facility] DME NRPX:589659818}  Transfer  {[facility] DME DIHQ:390747006}  Bathing  {[facility] DME EKNM:268123231}  Dressing  {[facility] DME DTGT:983325152}  Toileting  {[facility] DME UVJF:139864654}  Feeding  {CH[facility] DME AMII:535006742}  Med Admin  {[facility] DME KWKQ:753804076}  Med Delivery   {[facility] [place] Delivery:769531130}    Wound Care Documentation and Therapy:        Elimination:  Continence:   · Bowel: {YES / WE:31140}  · Bladder: {YES / PS:37745}  Urinary Catheter: {Urinary Catheter:246010489}   Colostomy/Ileostomy/Ileal Conduit: {YES / GD:62061}       Date of Last BM: ***  No intake or output data in the 24 hours ending 20 0905  No intake/output data recorded.     Safety Concerns:     [employer] Safety Concerns:705637720}    Impairments/Disabilities:      [employer] Impairments/Disabilities:118811370}    Nutrition Therapy:  Current Nutrition Therapy:   [employer] Diet List:569876855}    Routes of Feeding: {[facility] DME Other Feedings:972969397}  Liquids: {Slp liquid thickness:95596}  Daily Fluid Restriction: {[facility] DME Yes amt example:539069388}  Last Modified Barium Swallow with Video (Video Swallowing Test): {Done Not Done HVTR:323030186}    Treatments at the Time of Hospital Discharge:   Respiratory Treatments: ***  Oxygen Therapy:  {Therapy; copd oxygen:24988}  Ventilator:    {[facility] Vent QPPF:833100074}    Rehab Therapies: {THERAPEUTIC INTERVENTION:8235819241}  Weight Bearing Status/Restrictions: [employer] Weight [employer]}  Other Medical Equipment (for information only, NOT a DME order):  {EQUIPMENT:976793379}  Other Treatments: ***    Patient's personal belongings (please select all that are sent with patient):  {[facility] DME Belongings:629025992}    RN SIGNATURE:  {Esignature:730071893}    CASE MANAGEMENT/SOCIAL WORK SECTION    Inpatient Status Date: ***    Readmission Risk Assessment Score:  Readmission Risk              Risk of Unplanned Readmission:        0           Discharging to Facility/ Agency   · Name: · Address:  · Phone:  · Fax:    Dialysis Facility (if applicable)   · Name:  · Address:  · Dialysis Schedule:  · Phone:  · Fax:    / signature: {Esignature:790497648}    PHYSICIAN SECTION    Prognosis: {Prognosis:7696534975}    Condition at Discharge: Leroy Charles Patient Condition:751511907}    Rehab Potential (if transferring to Rehab): {Prognosis:5768864966}    Recommended Labs or Other Treatments After Discharge: ***    Physician Certification: I certify the above information and transfer of Shantell Quintanilla  is necessary for the continuing treatment of the diagnosis listed and that she requires {Admit to Appropriate Level of Care:00048} for {GREATER/LESS:042119976} 30 days.      Update Admission H&P: {CHP DME Changes in CLNJE:926103565}    PHYSICIAN SIGNATURE:  {Esignature:695334222}

## 2020-05-11 NOTE — ED PROVIDER NOTES
disease ([address] and Tuberculosis. She has a past surgical history that includes laparoscopy. Her family history includes Cancer in her maternal grandmother; Crohn's Disease in an other family member; Diabetes in her father, mother, and sister. She reports that she has quit smoking. She has never used smokeless tobacco. She reports previous alcohol use. She reports current drug use. Frequency: 7.00 times per week. Drug: Marijuana. Medications     Previous Medications    METOCLOPRAMIDE (REGLAN) 10 MG TABLET    Take 1 tablet by mouth 2 times daily as needed (cramps) WARNING:  May cause drowsiness. May impair ability to operate vehicles or machinery. Do not use in combination with alcohol. OMEPRAZOLE (PRILOSEC) 20 MG DELAYED RELEASE CAPSULE    Take 20 mg by mouth daily    ONDANSETRON (ZOFRAN ODT) 4 MG DISINTEGRATING TABLET    Take 1 tablet by mouth every 8 hours as needed for Nausea    OXYCODONE-ACETAMINOPHEN (PERCOCET) 5-325 MG PER TABLET    Take 1 tablet by mouth every 6 hours as needed for Pain for up to 3 days. TRAMADOL (ULTRAM) 50 MG TABLET    Take 50 mg by mouth every 6 hours as needed for Pain. Allergies     She is allergic to bentyl [dicyclomine hcl]. Physical Exam     INITIAL VITALS: BP: (!) 119/106, Temp: 98.3 °F (36.8 °C), Pulse: 55, Resp: 16, SpO2: 100 %   Physical Exam  Vitals signs reviewed. Constitutional:       Appearance: She is well-developed. Eyes:      Conjunctiva/sclera: Conjunctivae normal.      Pupils: Pupils are equal, round, and reactive to light. Neck:      Trachea: No tracheal deviation. Cardiovascular:      Rate and Rhythm: Normal rate and regular rhythm. Pulmonary:      Effort: Pulmonary effort is normal. No respiratory distress. Breath sounds: No wheezing or rales. Abdominal:      General: There is no distension. Tenderness: There is abdominal tenderness (Supra pubic region). There is no rebound. Comments: Pain in the suprapubic region. PATIENT REFERRED TO:  No follow-up provider specified.     DISCHARGE MEDICATIONS:  New Prescriptions    No medications on file       DISPOSITION Decision To Discharge 05/11/2020 01:57:35 Jamie Valderrama MD  Resident  05/11/20 6992

## 2020-05-12 LAB
ALBUMIN SERPL-MCNC: 3.6 G/DL
ALP BLD-CCNC: 45 U/L
ALT SERPL-CCNC: 12 U/L
ANION GAP SERPL CALCULATED.3IONS-SCNC: NORMAL MMOL/L
AST SERPL-CCNC: 14 U/L
BASOPHILS ABSOLUTE: ABNORMAL
BASOPHILS RELATIVE PERCENT: 0.8 %
BILIRUB SERPL-MCNC: 0.3 MG/DL (ref 0.1–1.4)
BUN BLDV-MCNC: 6 MG/DL
CALCIUM SERPL-MCNC: 9.3 MG/DL
CHLORIDE BLD-SCNC: 105 MMOL/L
CO2: 26 MMOL/L
CREAT SERPL-MCNC: 0.45 MG/DL
EOSINOPHILS ABSOLUTE: ABNORMAL
EOSINOPHILS RELATIVE PERCENT: 8.4 %
GFR CALCULATED: NORMAL
GLUCOSE BLD-MCNC: 79 MG/DL
HCT VFR BLD CALC: 29.7 % (ref 36–46)
HEMOGLOBIN: 8.7 G/DL (ref 12–16)
LYMPHOCYTES ABSOLUTE: ABNORMAL
LYMPHOCYTES RELATIVE PERCENT: 30.6 %
MCH RBC QN AUTO: 18.4 PG
MCHC RBC AUTO-ENTMCNC: 29.3 G/DL
MCV RBC AUTO: 62.7 FL
MONOCYTES ABSOLUTE: ABNORMAL
MONOCYTES RELATIVE PERCENT: 11.5 %
NEUTROPHILS ABSOLUTE: ABNORMAL
NEUTROPHILS RELATIVE PERCENT: 48.7 %
PDW BLD-RTO: 20.6 %
PLATELET # BLD: 902 K/ΜL
PMV BLD AUTO: 8.7 FL
POTASSIUM SERPL-SCNC: 4.4 MMOL/L
RBC # BLD: 4.74 10^6/ΜL
SEDIMENTATION RATE, ERYTHROCYTE: 36
SODIUM BLD-SCNC: 139 MMOL/L
TOTAL PROTEIN: 7.2
WBC # BLD: 3.6 10^3/ML

## 2020-05-13 ENCOUNTER — TELEPHONE (OUTPATIENT)
Dept: INFECTIOUS DISEASES | Age: 40
End: 2020-05-13

## 2020-05-14 NOTE — TELEPHONE ENCOUNTER
Called pt --    Seen in ED on 5/10 - c/o abd pain, seen by Surg in ED, had drain removed  Pt has talked to Dr Handy Olvera, want MRI, will set up with surgeon / for surgery    Pt has abd pain 5/14, less today. No n/v.   No fever / chills    If pain worse, call me / call GI

## 2020-05-19 LAB
ALBUMIN SERPL-MCNC: 3.5 G/DL
ALP BLD-CCNC: 43 U/L
ALT SERPL-CCNC: 11 U/L
ANION GAP SERPL CALCULATED.3IONS-SCNC: NORMAL MMOL/L
AST SERPL-CCNC: 13 U/L
BASOPHILS ABSOLUTE: 16 /ΜL
BASOPHILS RELATIVE PERCENT: 0.2 %
BILIRUB SERPL-MCNC: 0.3 MG/DL (ref 0.1–1.4)
BUN BLDV-MCNC: 10 MG/DL
C-REACTIVE PROTEIN: 4.3
CALCIUM SERPL-MCNC: 9 MG/DL
CHLORIDE BLD-SCNC: 106 MMOL/L
CO2: 27 MMOL/L
CREAT SERPL-MCNC: NORMAL MG/DL
EOSINOPHILS ABSOLUTE: 32 /ΜL
EOSINOPHILS RELATIVE PERCENT: 0.4 %
GFR CALCULATED: NORMAL
GLUCOSE BLD-MCNC: 69 MG/DL
HCT VFR BLD CALC: 27.1 % (ref 36–46)
HEMOGLOBIN: 7.9 G/DL (ref 12–16)
LYMPHOCYTES ABSOLUTE: 1280 /ΜL
LYMPHOCYTES RELATIVE PERCENT: 15.8 %
MCH RBC QN AUTO: 18.2 PG
MCHC RBC AUTO-ENTMCNC: 29.2 G/DL
MCV RBC AUTO: 62.4 FL
MONOCYTES ABSOLUTE: 664 /ΜL
MONOCYTES RELATIVE PERCENT: 8.2 %
NEUTROPHILS ABSOLUTE: 6107 /ΜL
NEUTROPHILS RELATIVE PERCENT: 75.4 %
PDW BLD-RTO: 21.2 %
PLATELET # BLD: 634 K/ΜL
PMV BLD AUTO: 9.3 FL
POTASSIUM SERPL-SCNC: 4.1 MMOL/L
RBC # BLD: 4.34 10^6/ΜL
SEDIMENTATION RATE, ERYTHROCYTE: 33
SODIUM BLD-SCNC: 140 MMOL/L
TOTAL PROTEIN: 6.9
WBC # BLD: 8.1 10^3/ML

## 2020-05-26 LAB
ALBUMIN SERPL-MCNC: 3.5 G/DL
ALP BLD-CCNC: 48 U/L
ALT SERPL-CCNC: 8 U/L
ANION GAP SERPL CALCULATED.3IONS-SCNC: NORMAL MMOL/L
AST SERPL-CCNC: 12 U/L
BASOPHILS ABSOLUTE: ABNORMAL
BASOPHILS RELATIVE PERCENT: ABNORMAL
BILIRUB SERPL-MCNC: 0.4 MG/DL (ref 0.1–1.4)
BUN BLDV-MCNC: 4 MG/DL
CALCIUM SERPL-MCNC: 8.9 MG/DL
CHLORIDE BLD-SCNC: 100 MMOL/L
CO2: 26 MMOL/L
CREAT SERPL-MCNC: 0.47 MG/DL
EOSINOPHILS ABSOLUTE: ABNORMAL
EOSINOPHILS RELATIVE PERCENT: 0.5 %
GFR CALCULATED: NORMAL
GLUCOSE BLD-MCNC: 87 MG/DL
HCT VFR BLD CALC: 27.7 % (ref 36–46)
HEMOGLOBIN: 8.1 G/DL (ref 12–16)
LYMPHOCYTES ABSOLUTE: ABNORMAL
LYMPHOCYTES RELATIVE PERCENT: ABNORMAL
MCH RBC QN AUTO: 17.9 PG
MCHC RBC AUTO-ENTMCNC: 29.2 G/DL
MCV RBC AUTO: 61.1 FL
MONOCYTES ABSOLUTE: ABNORMAL
MONOCYTES RELATIVE PERCENT: ABNORMAL
NEUTROPHILS ABSOLUTE: ABNORMAL
NEUTROPHILS RELATIVE PERCENT: 78 %
PDW BLD-RTO: 20.3 %
PLATELET # BLD: 636 K/ΜL
PMV BLD AUTO: 9.3 FL
POTASSIUM SERPL-SCNC: 4.1 MMOL/L
RBC # BLD: 4.53 10^6/ΜL
SEDIMENTATION RATE, ERYTHROCYTE: 53
SODIUM BLD-SCNC: 136 MMOL/L
TOTAL PROTEIN: 6.9
WBC # BLD: 11.1 10^3/ML

## 2020-05-27 ENCOUNTER — TELEPHONE (OUTPATIENT)
Dept: INFECTIOUS DISEASES | Age: 40
End: 2020-05-27

## 2020-05-27 NOTE — TELEPHONE ENCOUNTER
Called pt --  Pt is 'better'. Less abd, better stools. Has had GI f/u. End iv ertapenem, remove PICC    F/u GI.   Call for Surg appt

## 2020-06-15 ENCOUNTER — TELEPHONE (OUTPATIENT)
Dept: SURGERY | Age: 40
End: 2020-06-15

## 2023-02-14 PROBLEM — K50.90 CROHN DISEASE (HCC): Status: ACTIVE | Noted: 2023-02-14

## 2023-02-14 PROBLEM — D50.9 IRON DEFICIENCY ANEMIA, UNSPECIFIED: Status: ACTIVE | Noted: 2023-02-14

## 2024-07-11 NOTE — PLAN OF CARE
CM met with patient while aide getting updated vital signs. Patient reports she got up in the chair and to the bathroom with aide's assistance today. Aide reports patient moved \"pretty well\" needing minimal assistance.  CM offered home health therapy (RN, PT, OT) for transition of care. Patient verbaizes agreement and states she had therapy a few months ago. CM reviewed clinical record and found history with [facility] home health. Patient is agreeable to return of [facility]. CM placed order and sent referral.   PT/OT ordered today by MD. Will likely see in AM if needed.     Nutrition Problem: Severe malnutrition  Intervention: Food and/or Nutrient Delivery: Continue current diet, Continue current ONS  Nutritional Goals: Patient will eat 50% or greater of meals and supplements without GI distress.